# Patient Record
Sex: MALE | Race: WHITE | Employment: UNEMPLOYED | URBAN - METROPOLITAN AREA
[De-identification: names, ages, dates, MRNs, and addresses within clinical notes are randomized per-mention and may not be internally consistent; named-entity substitution may affect disease eponyms.]

---

## 2018-04-26 ENCOUNTER — OFFICE VISIT (OUTPATIENT)
Dept: FAMILY MEDICINE CLINIC | Age: 63
End: 2018-04-26
Payer: MEDICAID

## 2018-04-26 ENCOUNTER — HOSPITAL ENCOUNTER (OUTPATIENT)
Age: 63
Discharge: HOME | End: 2018-04-28
Payer: MEDICAID

## 2018-04-26 VITALS
WEIGHT: 228 LBS | SYSTOLIC BLOOD PRESSURE: 122 MMHG | DIASTOLIC BLOOD PRESSURE: 72 MMHG | OXYGEN SATURATION: 98 % | HEART RATE: 69 BPM | BODY MASS INDEX: 30.22 KG/M2 | TEMPERATURE: 97.8 F | HEIGHT: 73 IN

## 2018-04-26 DIAGNOSIS — G47.33 SLEEP APNEA, OBSTRUCTIVE: ICD-10-CM

## 2018-04-26 DIAGNOSIS — I10 ESSENTIAL HYPERTENSION: ICD-10-CM

## 2018-04-26 DIAGNOSIS — Z13.6 SCREENING FOR AAA (AORTIC ABDOMINAL ANEURYSM): ICD-10-CM

## 2018-04-26 DIAGNOSIS — Z86.2 HISTORY OF IRON DEFICIENCY ANEMIA: ICD-10-CM

## 2018-04-26 DIAGNOSIS — K22.70 BARRETT'S ESOPHAGUS WITHOUT DYSPLASIA: ICD-10-CM

## 2018-04-26 DIAGNOSIS — K51.90 ULCERATIVE COLITIS WITHOUT COMPLICATIONS, UNSPECIFIED LOCATION (HCC): ICD-10-CM

## 2018-04-26 DIAGNOSIS — Z79.4 DIABETES MELLITUS TYPE 2, INSULIN DEPENDENT (HCC): ICD-10-CM

## 2018-04-26 DIAGNOSIS — Z86.79 HISTORY OF ATRIAL FIBRILLATION: ICD-10-CM

## 2018-04-26 DIAGNOSIS — I25.810 CORONARY ARTERY DISEASE INVOLVING CORONARY BYPASS GRAFT OF NATIVE HEART WITHOUT ANGINA PECTORIS: ICD-10-CM

## 2018-04-26 DIAGNOSIS — E78.2 MIXED HYPERLIPIDEMIA: ICD-10-CM

## 2018-04-26 DIAGNOSIS — E11.9 DIABETES MELLITUS TYPE 2, INSULIN DEPENDENT (HCC): ICD-10-CM

## 2018-04-26 DIAGNOSIS — E11.9 DIABETES MELLITUS TYPE 2, INSULIN DEPENDENT (HCC): Primary | ICD-10-CM

## 2018-04-26 DIAGNOSIS — E11.9 ENCOUNTER FOR DIABETIC FOOT EXAM (HCC): ICD-10-CM

## 2018-04-26 DIAGNOSIS — Z79.4 DIABETES MELLITUS TYPE 2, INSULIN DEPENDENT (HCC): Primary | ICD-10-CM

## 2018-04-26 LAB
ALBUMIN SERPL-MCNC: 4.7 G/DL (ref 3.5–5.2)
ALP BLD-CCNC: 87 U/L (ref 40–129)
ALT SERPL-CCNC: 38 U/L (ref 0–40)
ANION GAP SERPL CALCULATED.3IONS-SCNC: 17 MMOL/L (ref 7–16)
AST SERPL-CCNC: 53 U/L (ref 0–39)
BILIRUB SERPL-MCNC: 0.3 MG/DL (ref 0–1.2)
BUN BLDV-MCNC: 19 MG/DL (ref 8–23)
CALCIUM SERPL-MCNC: 9.8 MG/DL (ref 8.6–10.2)
CHLORIDE BLD-SCNC: 105 MMOL/L (ref 98–107)
CHOLESTEROL, TOTAL: 154 MG/DL (ref 0–199)
CO2: 19 MMOL/L (ref 22–29)
CREAT SERPL-MCNC: 0.8 MG/DL (ref 0.7–1.2)
CREATININE URINE: 82 MG/DL (ref 40–278)
FERRITIN: 220 NG/ML
FOLATE: >20 NG/ML (ref 4.8–24.2)
GFR AFRICAN AMERICAN: >60
GFR NON-AFRICAN AMERICAN: >60 ML/MIN/1.73
GLUCOSE BLD-MCNC: 132 MG/DL (ref 74–109)
HBA1C MFR BLD: 6.5 % (ref 4.8–5.9)
HCT VFR BLD CALC: 50.9 % (ref 37–54)
HDLC SERPL-MCNC: 39 MG/DL
HEMOGLOBIN: 16.5 G/DL (ref 12.5–16.5)
IRON SATURATION: 34 % (ref 20–55)
IRON: 139 MCG/DL (ref 59–158)
LDL CHOLESTEROL CALCULATED: 74 MG/DL (ref 0–99)
MCH RBC QN AUTO: 32 PG (ref 26–35)
MCHC RBC AUTO-ENTMCNC: 32.4 % (ref 32–34.5)
MCV RBC AUTO: 98.6 FL (ref 80–99.9)
MICROALBUMIN UR-MCNC: 24.9 MG/L
MICROALBUMIN/CREAT UR-RTO: 30.4 (ref 0–30)
PDW BLD-RTO: 14.6 FL (ref 11.5–15)
PLATELET # BLD: 383 E9/L (ref 130–450)
PMV BLD AUTO: 11.3 FL (ref 7–12)
POTASSIUM SERPL-SCNC: 5 MMOL/L (ref 3.5–5)
RBC # BLD: 5.16 E12/L (ref 3.8–5.8)
SODIUM BLD-SCNC: 141 MMOL/L (ref 132–146)
TOTAL IRON BINDING CAPACITY: 403 MCG/DL (ref 250–450)
TOTAL PROTEIN: 7.5 G/DL (ref 6.4–8.3)
TRIGL SERPL-MCNC: 205 MG/DL (ref 0–149)
VITAMIN B-12: 452 PG/ML (ref 211–946)
VLDLC SERPL CALC-MCNC: 41 MG/DL
WBC # BLD: 8.9 E9/L (ref 4.5–11.5)

## 2018-04-26 PROCEDURE — 85027 COMPLETE CBC AUTOMATED: CPT

## 2018-04-26 PROCEDURE — 82570 ASSAY OF URINE CREATININE: CPT

## 2018-04-26 PROCEDURE — 82044 UR ALBUMIN SEMIQUANTITATIVE: CPT

## 2018-04-26 PROCEDURE — 83036 HEMOGLOBIN GLYCOSYLATED A1C: CPT

## 2018-04-26 PROCEDURE — 3017F COLORECTAL CA SCREEN DOC REV: CPT | Performed by: FAMILY MEDICINE

## 2018-04-26 PROCEDURE — 83540 ASSAY OF IRON: CPT

## 2018-04-26 PROCEDURE — 82728 ASSAY OF FERRITIN: CPT

## 2018-04-26 PROCEDURE — G8417 CALC BMI ABV UP PARAM F/U: HCPCS | Performed by: FAMILY MEDICINE

## 2018-04-26 PROCEDURE — 80053 COMPREHEN METABOLIC PANEL: CPT

## 2018-04-26 PROCEDURE — 99214 OFFICE O/P EST MOD 30 MIN: CPT | Performed by: FAMILY MEDICINE

## 2018-04-26 PROCEDURE — 80061 LIPID PANEL: CPT

## 2018-04-26 PROCEDURE — 36415 COLL VENOUS BLD VENIPUNCTURE: CPT | Performed by: FAMILY MEDICINE

## 2018-04-26 PROCEDURE — 2022F DILAT RTA XM EVC RTNOPTHY: CPT | Performed by: FAMILY MEDICINE

## 2018-04-26 PROCEDURE — 83550 IRON BINDING TEST: CPT

## 2018-04-26 PROCEDURE — G8427 DOCREV CUR MEDS BY ELIG CLIN: HCPCS | Performed by: FAMILY MEDICINE

## 2018-04-26 PROCEDURE — 82746 ASSAY OF FOLIC ACID SERUM: CPT

## 2018-04-26 PROCEDURE — 82607 VITAMIN B-12: CPT

## 2018-04-26 PROCEDURE — 1036F TOBACCO NON-USER: CPT | Performed by: FAMILY MEDICINE

## 2018-04-26 PROCEDURE — 3044F HG A1C LEVEL LT 7.0%: CPT | Performed by: FAMILY MEDICINE

## 2018-04-26 PROCEDURE — G8598 ASA/ANTIPLAT THER USED: HCPCS | Performed by: FAMILY MEDICINE

## 2018-04-26 RX ORDER — PREDNISONE 2.5 MG
2.5 TABLET ORAL DAILY
COMMUNITY
End: 2018-05-23 | Stop reason: SDUPTHER

## 2018-04-26 RX ORDER — ERGOCALCIFEROL 1.25 MG/1
50000 CAPSULE ORAL
COMMUNITY
End: 2018-11-14 | Stop reason: SDUPTHER

## 2018-04-26 RX ORDER — MULTIVITAMIN WITH IRON
100 TABLET ORAL DAILY
COMMUNITY
End: 2018-07-24 | Stop reason: SDUPTHER

## 2018-04-26 ASSESSMENT — PATIENT HEALTH QUESTIONNAIRE - PHQ9
SUM OF ALL RESPONSES TO PHQ9 QUESTIONS 1 & 2: 1
1. LITTLE INTEREST OR PLEASURE IN DOING THINGS: 1
2. FEELING DOWN, DEPRESSED OR HOPELESS: 0
SUM OF ALL RESPONSES TO PHQ QUESTIONS 1-9: 1

## 2018-05-01 ENCOUNTER — OFFICE VISIT (OUTPATIENT)
Dept: FAMILY MEDICINE CLINIC | Age: 63
End: 2018-05-01
Payer: MEDICAID

## 2018-05-01 VITALS
HEART RATE: 78 BPM | OXYGEN SATURATION: 96 % | WEIGHT: 226 LBS | HEIGHT: 73 IN | BODY MASS INDEX: 29.95 KG/M2 | TEMPERATURE: 97.7 F | SYSTOLIC BLOOD PRESSURE: 130 MMHG | DIASTOLIC BLOOD PRESSURE: 70 MMHG

## 2018-05-01 DIAGNOSIS — E11.9 DIABETES MELLITUS TYPE 2, INSULIN DEPENDENT (HCC): Primary | ICD-10-CM

## 2018-05-01 DIAGNOSIS — E78.2 MIXED HYPERLIPIDEMIA: ICD-10-CM

## 2018-05-01 DIAGNOSIS — Z79.4 DIABETES MELLITUS TYPE 2, INSULIN DEPENDENT (HCC): Primary | ICD-10-CM

## 2018-05-01 DIAGNOSIS — I10 ESSENTIAL HYPERTENSION: ICD-10-CM

## 2018-05-01 PROCEDURE — 99213 OFFICE O/P EST LOW 20 MIN: CPT | Performed by: FAMILY MEDICINE

## 2018-05-01 PROCEDURE — 2022F DILAT RTA XM EVC RTNOPTHY: CPT | Performed by: FAMILY MEDICINE

## 2018-05-01 PROCEDURE — G8427 DOCREV CUR MEDS BY ELIG CLIN: HCPCS | Performed by: FAMILY MEDICINE

## 2018-05-01 PROCEDURE — G8417 CALC BMI ABV UP PARAM F/U: HCPCS | Performed by: FAMILY MEDICINE

## 2018-05-01 PROCEDURE — 3017F COLORECTAL CA SCREEN DOC REV: CPT | Performed by: FAMILY MEDICINE

## 2018-05-01 PROCEDURE — 1036F TOBACCO NON-USER: CPT | Performed by: FAMILY MEDICINE

## 2018-05-01 PROCEDURE — G8598 ASA/ANTIPLAT THER USED: HCPCS | Performed by: FAMILY MEDICINE

## 2018-05-01 PROCEDURE — 3044F HG A1C LEVEL LT 7.0%: CPT | Performed by: FAMILY MEDICINE

## 2018-05-01 RX ORDER — FENOFIBRATE 145 MG/1
145 TABLET, COATED ORAL DAILY
Qty: 30 TABLET | Refills: 2 | Status: SHIPPED | OUTPATIENT
Start: 2018-05-01 | End: 2018-07-19 | Stop reason: SDUPTHER

## 2018-05-01 RX ORDER — CHLORAL HYDRATE 500 MG
3000 CAPSULE ORAL DAILY
Qty: 90 CAPSULE | Refills: 5 | Status: SHIPPED | OUTPATIENT
Start: 2018-05-01 | End: 2018-10-03 | Stop reason: SDUPTHER

## 2018-05-01 RX ORDER — ATORVASTATIN CALCIUM 40 MG/1
40 TABLET, FILM COATED ORAL DAILY
COMMUNITY
End: 2018-11-14 | Stop reason: SDUPTHER

## 2018-05-10 DIAGNOSIS — Z13.6 SCREENING FOR AAA (AORTIC ABDOMINAL ANEURYSM): ICD-10-CM

## 2018-05-23 DIAGNOSIS — E11.9 DIABETES MELLITUS TYPE 2, INSULIN DEPENDENT (HCC): ICD-10-CM

## 2018-05-23 DIAGNOSIS — Z79.4 DIABETES MELLITUS TYPE 2, INSULIN DEPENDENT (HCC): ICD-10-CM

## 2018-05-23 DIAGNOSIS — K51.90 ULCERATIVE COLITIS WITHOUT COMPLICATIONS, UNSPECIFIED LOCATION (HCC): Primary | ICD-10-CM

## 2018-05-23 RX ORDER — LANCETS 33 GAUGE
EACH MISCELLANEOUS
Qty: 100 EACH | Refills: 5 | Status: SHIPPED | OUTPATIENT
Start: 2018-05-23 | End: 2018-11-13 | Stop reason: SDUPTHER

## 2018-05-23 RX ORDER — PREDNISONE 2.5 MG
2.5 TABLET ORAL DAILY
Qty: 30 TABLET | Refills: 1 | Status: SHIPPED | OUTPATIENT
Start: 2018-05-23 | End: 2018-07-19 | Stop reason: SDUPTHER

## 2018-05-23 RX ORDER — LANCETS 33 GAUGE
EACH MISCELLANEOUS
Refills: 0 | COMMUNITY
Start: 2018-05-06 | End: 2018-05-23 | Stop reason: SDUPTHER

## 2018-05-23 RX ORDER — AZATHIOPRINE 50 MG/1
50 TABLET ORAL 3 TIMES DAILY
Qty: 90 TABLET | Refills: 1 | Status: SHIPPED | OUTPATIENT
Start: 2018-05-23 | End: 2018-07-19 | Stop reason: SDUPTHER

## 2018-05-24 DIAGNOSIS — M25.50 ARTHRALGIA, UNSPECIFIED JOINT: Primary | ICD-10-CM

## 2018-05-24 DIAGNOSIS — G44.029 CHRONIC CLUSTER HEADACHE, NOT INTRACTABLE: ICD-10-CM

## 2018-05-24 RX ORDER — NAPROXEN 500 MG/1
500 TABLET ORAL 2 TIMES DAILY PRN
Qty: 60 TABLET | Refills: 1 | Status: SHIPPED | OUTPATIENT
Start: 2018-05-24 | End: 2018-07-19 | Stop reason: SDUPTHER

## 2018-06-26 ENCOUNTER — HOSPITAL ENCOUNTER (OUTPATIENT)
Age: 63
Discharge: HOME | End: 2018-06-28
Payer: MEDICAID

## 2018-06-26 ENCOUNTER — NURSE ONLY (OUTPATIENT)
Dept: FAMILY MEDICINE CLINIC | Age: 63
End: 2018-06-26
Payer: MEDICAID

## 2018-06-26 DIAGNOSIS — Z79.4 DIABETES MELLITUS TYPE 2, INSULIN DEPENDENT (HCC): Primary | ICD-10-CM

## 2018-06-26 DIAGNOSIS — E11.9 DIABETES MELLITUS TYPE 2, INSULIN DEPENDENT (HCC): Primary | ICD-10-CM

## 2018-06-26 DIAGNOSIS — E78.2 MIXED HYPERLIPIDEMIA: ICD-10-CM

## 2018-06-26 LAB
ALBUMIN SERPL-MCNC: 4.2 G/DL (ref 3.5–5.2)
ALP BLD-CCNC: 82 U/L (ref 40–129)
ALT SERPL-CCNC: 42 U/L (ref 0–40)
ANION GAP SERPL CALCULATED.3IONS-SCNC: 16 MMOL/L (ref 7–16)
AST SERPL-CCNC: 46 U/L (ref 0–39)
BILIRUB SERPL-MCNC: 0.4 MG/DL (ref 0–1.2)
BILIRUBIN DIRECT: <0.2 MG/DL (ref 0–0.3)
BILIRUBIN, INDIRECT: ABNORMAL MG/DL (ref 0–1)
BUN BLDV-MCNC: 19 MG/DL (ref 8–23)
CALCIUM SERPL-MCNC: 9.4 MG/DL (ref 8.6–10.2)
CHLORIDE BLD-SCNC: 100 MMOL/L (ref 98–107)
CHOLESTEROL, TOTAL: 124 MG/DL (ref 0–199)
CO2: 24 MMOL/L (ref 22–29)
CREAT SERPL-MCNC: 1.1 MG/DL (ref 0.7–1.2)
GFR AFRICAN AMERICAN: >60
GFR NON-AFRICAN AMERICAN: >60 ML/MIN/1.73
GLUCOSE BLD-MCNC: 86 MG/DL (ref 74–109)
HDLC SERPL-MCNC: 38 MG/DL
LDL CHOLESTEROL CALCULATED: 55 MG/DL (ref 0–99)
POTASSIUM SERPL-SCNC: 5.1 MMOL/L (ref 3.5–5)
SODIUM BLD-SCNC: 140 MMOL/L (ref 132–146)
TOTAL PROTEIN: 7.1 G/DL (ref 6.4–8.3)
TRIGL SERPL-MCNC: 155 MG/DL (ref 0–149)
VLDLC SERPL CALC-MCNC: 31 MG/DL

## 2018-06-26 PROCEDURE — 80061 LIPID PANEL: CPT

## 2018-06-26 PROCEDURE — 36415 COLL VENOUS BLD VENIPUNCTURE: CPT | Performed by: FAMILY MEDICINE

## 2018-06-26 PROCEDURE — 80048 BASIC METABOLIC PNL TOTAL CA: CPT

## 2018-06-26 PROCEDURE — 80076 HEPATIC FUNCTION PANEL: CPT

## 2018-06-27 DIAGNOSIS — K51.90 ULCERATIVE COLITIS WITHOUT COMPLICATIONS, UNSPECIFIED LOCATION (HCC): Primary | ICD-10-CM

## 2018-06-27 RX ORDER — MESALAMINE 1.2 G/1
1.2 TABLET, DELAYED RELEASE ORAL 3 TIMES DAILY
Qty: 30 TABLET | Refills: 2 | Status: SHIPPED | OUTPATIENT
Start: 2018-06-27 | End: 2018-06-27 | Stop reason: SDUPTHER

## 2018-06-27 RX ORDER — MESALAMINE 1.2 G/1
1.2 TABLET, DELAYED RELEASE ORAL 3 TIMES DAILY
Qty: 90 TABLET | Refills: 2 | Status: SHIPPED | OUTPATIENT
Start: 2018-06-27 | End: 2018-09-18 | Stop reason: SDUPTHER

## 2018-06-27 RX ORDER — MESALAMINE 0.38 G/1
1.2 CAPSULE, EXTENDED RELEASE ORAL 3 TIMES DAILY
Qty: 120 CAPSULE | OUTPATIENT
Start: 2018-06-27

## 2018-06-27 RX ORDER — MESALAMINE 1.2 G/1
1.2 TABLET, DELAYED RELEASE ORAL
Refills: 0 | COMMUNITY
Start: 2018-03-26 | End: 2018-06-27 | Stop reason: SDUPTHER

## 2018-06-28 DIAGNOSIS — E11.9 DIABETES MELLITUS TYPE 2, INSULIN DEPENDENT (HCC): Primary | ICD-10-CM

## 2018-06-28 DIAGNOSIS — Z79.4 DIABETES MELLITUS TYPE 2, INSULIN DEPENDENT (HCC): Primary | ICD-10-CM

## 2018-07-03 ENCOUNTER — OFFICE VISIT (OUTPATIENT)
Dept: FAMILY MEDICINE CLINIC | Age: 63
End: 2018-07-03
Payer: MEDICAID

## 2018-07-03 VITALS
DIASTOLIC BLOOD PRESSURE: 72 MMHG | WEIGHT: 230 LBS | OXYGEN SATURATION: 98 % | HEART RATE: 70 BPM | SYSTOLIC BLOOD PRESSURE: 120 MMHG | BODY MASS INDEX: 30.48 KG/M2 | TEMPERATURE: 98.3 F | HEIGHT: 73 IN

## 2018-07-03 DIAGNOSIS — I65.22 STENOSIS OF LEFT EXTERNAL CAROTID ARTERY: ICD-10-CM

## 2018-07-03 DIAGNOSIS — Z79.4 DIABETES MELLITUS TYPE 2, INSULIN DEPENDENT (HCC): Primary | ICD-10-CM

## 2018-07-03 DIAGNOSIS — G89.29 CHRONIC RIGHT SHOULDER PAIN: ICD-10-CM

## 2018-07-03 DIAGNOSIS — Z11.4 ENCOUNTER FOR SCREENING FOR HIV: ICD-10-CM

## 2018-07-03 DIAGNOSIS — M25.511 CHRONIC RIGHT SHOULDER PAIN: ICD-10-CM

## 2018-07-03 DIAGNOSIS — R79.89 ELEVATED LIVER FUNCTION TESTS: ICD-10-CM

## 2018-07-03 DIAGNOSIS — E78.2 MIXED HYPERLIPIDEMIA: ICD-10-CM

## 2018-07-03 DIAGNOSIS — E55.9 VITAMIN D DEFICIENCY: ICD-10-CM

## 2018-07-03 DIAGNOSIS — E11.9 DIABETES MELLITUS TYPE 2, INSULIN DEPENDENT (HCC): Primary | ICD-10-CM

## 2018-07-03 DIAGNOSIS — Z11.59 NEED FOR HEPATITIS C SCREENING TEST: ICD-10-CM

## 2018-07-03 DIAGNOSIS — Z72.0 TOBACCO USE: ICD-10-CM

## 2018-07-03 DIAGNOSIS — I10 ESSENTIAL HYPERTENSION: ICD-10-CM

## 2018-07-03 DIAGNOSIS — G47.33 SLEEP APNEA, OBSTRUCTIVE: ICD-10-CM

## 2018-07-03 PROCEDURE — 99215 OFFICE O/P EST HI 40 MIN: CPT | Performed by: FAMILY MEDICINE

## 2018-07-03 PROCEDURE — 1036F TOBACCO NON-USER: CPT | Performed by: FAMILY MEDICINE

## 2018-07-03 PROCEDURE — G8427 DOCREV CUR MEDS BY ELIG CLIN: HCPCS | Performed by: FAMILY MEDICINE

## 2018-07-03 PROCEDURE — 2022F DILAT RTA XM EVC RTNOPTHY: CPT | Performed by: FAMILY MEDICINE

## 2018-07-03 PROCEDURE — 3017F COLORECTAL CA SCREEN DOC REV: CPT | Performed by: FAMILY MEDICINE

## 2018-07-03 PROCEDURE — G8417 CALC BMI ABV UP PARAM F/U: HCPCS | Performed by: FAMILY MEDICINE

## 2018-07-03 PROCEDURE — 3044F HG A1C LEVEL LT 7.0%: CPT | Performed by: FAMILY MEDICINE

## 2018-07-03 PROCEDURE — G8598 ASA/ANTIPLAT THER USED: HCPCS | Performed by: FAMILY MEDICINE

## 2018-07-03 NOTE — PROGRESS NOTES
History and Physical    Patient:   61 y.o. male MRN: <[id number]>     Date of Service: 7/3/2018      Chief complaint:    History of Present Illness   The patient is a 61 y.o. male    Chief Complaint   Patient presents with    Discuss Labs      CC:  DM type 2, HTN, Hyperlipidemia, ZEENAT, right shoulder pain                       C/o Fatigue                        Hx of ZEENAT - last testing [facility] - could not tolerate CPAP mask                        Tobacco use- he quit cigarettes - he vapes daily                            Past Medical History:      Diagnosis Date    Arthritis     Colitis     Coronary artery disease of native artery of native heart with stable angina pectoris ([facility]     Diabetes mellitus ([facility]     Hypertension     Sleep apnea        Past Surgical History:        Procedure Laterality Date    APPENDECTOMY      BACK SURGERY      CARDIAC CATHETERIZATION  03/29/2017    Dr. Zee Ala GRAFT  04/06/2017    x2    ENDOSCOPY, COLON, DIAGNOSTIC      FRACTURE SURGERY      wrist    FRACTURE SURGERY      left tibia    FRACTURE SURGERY      fingers    FRACTURE SURGERY      collar bone    UVULECTOMY         Allergies:  Lisinopril    Social History:   TOBACCO:   reports that he quit smoking about 3 years ago. His smoking use included E-Cigarettes and Cigarettes. He has a 52.50 pack-year smoking history. He has never used smokeless tobacco.  ETOH:   reports that he drinks about 6.0 oz of alcohol per week .       Family History:   Family History   Problem Relation Age of Onset    Asthma Mother     Diabetes Father     Heart Disease Father        REVIEW OF SYSTEMS:     Review of Systems - General ROS: negative for - fever  Ophthalmic ROS: negative for - blurry vision, eye pain or loss of vision  ENT ROS: negative for - epistaxis, nasal congestion or nasal discharge  Has seen ENT for exam and states hearing testing was o.k    Allergy and Immunology ROS: negative for - seasonal allergies that he is aware of- he will have some nasal sx with mowing   Respiratory ROS: no cough, shortness of breath , or wheezing-hx in the past of pleural effusions-evaluated by Dr Torsten Sparks 2017 following    cardiac surgery     Cardiovascular ROS: no chest pain or dyspnea on exertion with walking- he will have some shortness of breath with exercise on the treadmill  Gastrointestinal ROS: no abdominal pain, no black or bloody stools, hx of colitis with intermittent diarrhea   Genito-Urinary ROS: no dysuria, no hematuria-hx of urinary stones with occasional difficulty voiding - followed by Dr Malu Garcia ROS:chronic right shoulder pain- had MRI -was told by ortho he needs rotator cuff surgery       Physical Exam   /72 (Site: Right Arm, Position: Sitting, Cuff Size: Large Adult)   Pulse 70   Temp 98.3 °F (36.8 °C) (Oral)   Ht 6' 0.6\" (1.844 m)   Wt 230 lb (104.3 kg)   SpO2 98%   BMI 30.68 kg/m²   Physical Examination: General appearance - alert, well appearing, and in no distress  Head-normocephalic   Mental status - normal mood, behavior, speech, dress, motor activity, and thought processes  Eyes - pupils equal and reactive, extraocular eye movements intact  Ears - bilateral TM's and external ear canals normal  Nose - normal and patent, no erythema, discharge or polyps  Mouth - mucous membranes moist, pharynx normal without lesions  Chest - clear to auscultation, no wheezes, rales or rhonchi, symmetric air entry  Heart - normal rate and regular rhythm  Abdomen - soft, nontender, nondistended, no masses or organomegaly  +diastasis  Extremities -no edema of the lower ext     Lunda Pastures was seen today for discuss labs. Diagnoses and all orders for this visit:    Diabetes mellitus type 2, insulin dependent ([facility]  -     Hemoglobin A1C; Future  -     Microalbumin / Creatinine Urine Ratio;  Future    Essential hypertension  Blood pressure is controlled     Mixed hyperlipidemia  On fenofibrate and atorvastatin -gemfibrozil previously stopped- triglycerides are 155 and LDL is 55    Sleep apnea, obstructive  Encouraged to get another sleep test - he declined for the present time and states he understands the health risks of untreated sleep apnea      Chronic right shoulder pain  Followed by ortho     Stenosis of left external carotid artery  -     US CAROTID ARTERY BILATERAL; Future  Due to have repeated     Tobacco use  Encouraged to quit use (vapes)   Patient counseled to stop smoking and using tobacco.  The patient understands the complications and consequences that might be encountered by smoking. These include but are not limited to lung disease, cancer, stroke, heart disease and ultimately death. Encounter for screening for HIV  -     HIV-1 AND HIV-2 ANTIBODIES; Future    Need for hepatitis C screening test  -     HEPATITIS C ANTIBODY; Future    Vitamin D deficiency  -     Vitamin D 25 Hydrox, D2 & D3; Future    Elevated liver function tests  -     Hepatic Function Panel; Future    Labs were reviewed with him today   Average for glucoses 152 -followed [facility]   He agreed to HIV and Hep C screening   Pt refused CT screening of the lung - he does not think he will be able to tolerate getting on the table for the CT screening of the lung   Follow-up in two mn -he will have labs drawn at [facility] prior to the visit and I asked him to call when he has them drawn so results can be obtained. Please note that >40 minutes was spent face-to-face with patient gathering history, performing physical exam, discussing findings, counseling patient, and determining plan forward. All questions addressed and answered. Sheng ANN.

## 2018-07-19 DIAGNOSIS — K51.90 ULCERATIVE COLITIS WITHOUT COMPLICATIONS, UNSPECIFIED LOCATION (HCC): ICD-10-CM

## 2018-07-19 DIAGNOSIS — G44.029 CHRONIC CLUSTER HEADACHE, NOT INTRACTABLE: ICD-10-CM

## 2018-07-19 DIAGNOSIS — E78.2 MIXED HYPERLIPIDEMIA: ICD-10-CM

## 2018-07-19 DIAGNOSIS — M25.50 ARTHRALGIA, UNSPECIFIED JOINT: ICD-10-CM

## 2018-07-19 RX ORDER — FENOFIBRATE 145 MG/1
145 TABLET, COATED ORAL DAILY
Qty: 30 TABLET | Refills: 5 | Status: SHIPPED | OUTPATIENT
Start: 2018-07-19 | End: 2018-12-27 | Stop reason: SDUPTHER

## 2018-07-19 RX ORDER — NAPROXEN 500 MG/1
500 TABLET ORAL 2 TIMES DAILY PRN
Qty: 60 TABLET | Refills: 1 | Status: CANCELLED | OUTPATIENT
Start: 2018-07-19

## 2018-07-19 RX ORDER — AZATHIOPRINE 50 MG/1
50 TABLET ORAL 3 TIMES DAILY
Qty: 90 TABLET | Refills: 3 | Status: SHIPPED | OUTPATIENT
Start: 2018-07-19 | End: 2018-11-27 | Stop reason: SDUPTHER

## 2018-07-19 RX ORDER — NAPROXEN 500 MG/1
500 TABLET ORAL 2 TIMES DAILY PRN
Qty: 60 TABLET | Refills: 2 | Status: SHIPPED | OUTPATIENT
Start: 2018-07-19 | End: 2018-10-03 | Stop reason: SDUPTHER

## 2018-07-19 RX ORDER — PREDNISONE 2.5 MG
2.5 TABLET ORAL DAILY
Qty: 30 TABLET | Refills: 5 | Status: SHIPPED | OUTPATIENT
Start: 2018-07-19 | End: 2018-12-27 | Stop reason: SDUPTHER

## 2018-07-19 RX ORDER — PREDNISONE 2.5 MG
2.5 TABLET ORAL DAILY
Qty: 30 TABLET | Refills: 1 | Status: CANCELLED | OUTPATIENT
Start: 2018-07-19

## 2018-07-19 RX ORDER — FENOFIBRATE 145 MG/1
145 TABLET, COATED ORAL DAILY
Qty: 30 TABLET | Refills: 2 | Status: CANCELLED | OUTPATIENT
Start: 2018-07-19

## 2018-07-19 NOTE — TELEPHONE ENCOUNTER
From: Alejandro Urbano  Sent: 7/19/2018 10:40 AM EDT  Subject: Medication Renewal Request    Alejandro Sundeep would like a refill of the following medications:     fenofibrate (TRICOR) 145 MG tablet Regi Kimball, DO]   Patient Comment: please refill     predniSONE (DELTASONE) 2.5 MG tablet Regi Kimball, DO]   Patient Comment: please refill     naproxen (NAPROSYN) 500 MG tablet Regi Kimball, DO]   Patient Comment: please refill    Preferred pharmacy: [address] [place] [address] [address] [phone] F [phone]

## 2018-07-24 DIAGNOSIS — E53.1 VITAMIN B6 DEFICIENCY: Primary | ICD-10-CM

## 2018-07-24 RX ORDER — MULTIVITAMIN WITH IRON
100 TABLET ORAL DAILY
Qty: 90 TABLET | Refills: 3 | Status: SHIPPED | OUTPATIENT
Start: 2018-07-24 | End: 2019-06-12

## 2018-07-27 LAB
AVERAGE GLUCOSE: 100
HBA1C MFR BLD: 6.2 %

## 2018-07-30 ENCOUNTER — HOSPITAL ENCOUNTER (OUTPATIENT)
Dept: ULTRASOUND IMAGING | Age: 63
Discharge: HOME | End: 2018-08-01
Payer: MEDICAID

## 2018-07-30 DIAGNOSIS — I65.22 STENOSIS OF LEFT EXTERNAL CAROTID ARTERY: ICD-10-CM

## 2018-07-30 PROCEDURE — 93880 EXTRACRANIAL BILAT STUDY: CPT

## 2018-08-24 DIAGNOSIS — Z79.4 DIABETES MELLITUS TYPE 2, INSULIN DEPENDENT (HCC): ICD-10-CM

## 2018-08-24 DIAGNOSIS — E11.9 DIABETES MELLITUS TYPE 2, INSULIN DEPENDENT (HCC): ICD-10-CM

## 2018-08-24 DIAGNOSIS — R79.89 ELEVATED LIVER FUNCTION TESTS: ICD-10-CM

## 2018-08-27 DIAGNOSIS — Z11.59 NEED FOR HEPATITIS C SCREENING TEST: ICD-10-CM

## 2018-08-27 DIAGNOSIS — E11.9 DIABETES MELLITUS TYPE 2, INSULIN DEPENDENT (HCC): ICD-10-CM

## 2018-08-27 DIAGNOSIS — Z79.4 DIABETES MELLITUS TYPE 2, INSULIN DEPENDENT (HCC): ICD-10-CM

## 2018-08-27 DIAGNOSIS — Z11.4 ENCOUNTER FOR SCREENING FOR HIV: ICD-10-CM

## 2018-09-04 DIAGNOSIS — E55.9 VITAMIN D DEFICIENCY: ICD-10-CM

## 2018-09-07 ENCOUNTER — OFFICE VISIT (OUTPATIENT)
Dept: FAMILY MEDICINE CLINIC | Age: 63
End: 2018-09-07
Payer: MEDICAID

## 2018-09-07 VITALS
TEMPERATURE: 98.3 F | WEIGHT: 229 LBS | OXYGEN SATURATION: 98 % | DIASTOLIC BLOOD PRESSURE: 82 MMHG | HEART RATE: 102 BPM | SYSTOLIC BLOOD PRESSURE: 138 MMHG | BODY MASS INDEX: 30.35 KG/M2 | HEIGHT: 73 IN

## 2018-09-07 DIAGNOSIS — E78.2 MIXED HYPERLIPIDEMIA: ICD-10-CM

## 2018-09-07 DIAGNOSIS — E11.9 DIABETES MELLITUS TYPE 2, INSULIN DEPENDENT (HCC): Primary | ICD-10-CM

## 2018-09-07 DIAGNOSIS — G89.29 CHRONIC RIGHT SHOULDER PAIN: ICD-10-CM

## 2018-09-07 DIAGNOSIS — M25.511 CHRONIC RIGHT SHOULDER PAIN: ICD-10-CM

## 2018-09-07 DIAGNOSIS — K51.90 ULCERATIVE COLITIS WITHOUT COMPLICATIONS, UNSPECIFIED LOCATION (HCC): ICD-10-CM

## 2018-09-07 DIAGNOSIS — Z79.4 DIABETES MELLITUS TYPE 2, INSULIN DEPENDENT (HCC): Primary | ICD-10-CM

## 2018-09-07 DIAGNOSIS — I10 ESSENTIAL HYPERTENSION: ICD-10-CM

## 2018-09-07 DIAGNOSIS — R61 CHRONIC NIGHT SWEATS: ICD-10-CM

## 2018-09-07 DIAGNOSIS — G47.33 SLEEP APNEA, OBSTRUCTIVE: ICD-10-CM

## 2018-09-07 PROCEDURE — 1036F TOBACCO NON-USER: CPT | Performed by: FAMILY MEDICINE

## 2018-09-07 PROCEDURE — 99213 OFFICE O/P EST LOW 20 MIN: CPT | Performed by: FAMILY MEDICINE

## 2018-09-07 PROCEDURE — 3017F COLORECTAL CA SCREEN DOC REV: CPT | Performed by: FAMILY MEDICINE

## 2018-09-07 PROCEDURE — G8598 ASA/ANTIPLAT THER USED: HCPCS | Performed by: FAMILY MEDICINE

## 2018-09-07 PROCEDURE — 3044F HG A1C LEVEL LT 7.0%: CPT | Performed by: FAMILY MEDICINE

## 2018-09-07 PROCEDURE — 2022F DILAT RTA XM EVC RTNOPTHY: CPT | Performed by: FAMILY MEDICINE

## 2018-09-07 PROCEDURE — G8417 CALC BMI ABV UP PARAM F/U: HCPCS | Performed by: FAMILY MEDICINE

## 2018-09-07 PROCEDURE — G8427 DOCREV CUR MEDS BY ELIG CLIN: HCPCS | Performed by: FAMILY MEDICINE

## 2018-09-07 NOTE — PROGRESS NOTES
Problem Relation Age of Onset    Asthma Mother     Diabetes Father     Heart Disease Father        REVIEW OF SYSTEMS:     Review of Systems - General ROS: negative for - chills, fever or weight loss  He states he always feels fatigued - he states he thinks this is due to his sleep apnea and he is not using his equipment . Ophthalmic ROS: negative for - double vision, eye pain or loss of vision  ENT ROS: negative for - epistaxis, headaches, nasal congestion, nasal discharge, sinus pain, tinnitus, vertigo or visual changes  Hematological and Lymphatic ROS: positive for - bruising-states only occasionally -limited to legs and arms  +night sweats and \"sweating all day\"   negative for - bleeding problems, blood clots, blood transfusions, jaundice, swollen lymph nodes or weight loss  Respiratory ROS: no cough, shortness of breath, or wheezing  Cardiovascular ROS: no chest pain or dyspnea on exertion  negative for - dyspnea on exertion, palpitations or rapid heart rate  He occasionally has edema of the feet that he attributes to \"too much salt\" after he eats salty food. he states this occurs about once monthly and resolves within a day.    Gastrointestinal ROS: no abdominal pain, no bloody stools  He has not had a flare of his colitis \"for some time\"   Genito-Urinary ROS: no dysuria, no hematuria  He occasionally has a weak urinary stream   Musculoskeletal ROS: positive for - pain in right shoulder  Neurological ROS: no TIA or stroke symptoms  negative for - dizziness, headaches, impaired coordination/balance, numbness/tingling, visual changes or weakness  Dermatological ROS: negative for - hair changes, mole changes, rash or skin lesion changes  Notes \"rough spot\" behind left ear     Physical Exam   /82 (Site: Right Upper Arm, Position: Sitting, Cuff Size: Large Adult)   Pulse 102   Temp 98.3 °F (36.8 °C) (Oral)   Ht 6' 0.6\" (1.844 m)   Wt 229 lb (103.9 kg)   SpO2 98%   BMI 30.55 kg/m²   Physical Examination: General appearance - alert, well appearing, and in no distress  Head-normocephalic   Ears - bilateral TM's and external ear canals were not injected   Nose - nares are clear and patent, no mucosal erythema, no nasal discharge   Mouth - mucous membranes moist, pharynx was not injected and was without lesions  Neck - supple, no palpable adenopathy  Lymphatics - no palpable supraclavicular lymphadenopathy  Chest - clear to auscultation, no wheezes, rales or rhonchi, symmetric air entry  Heart - normal rate, regular rhythm, normal S1, S2, no murmurs, rubs, clicks or gallops  Abdomen - soft, nontender, nondistended, no masses palpable, no guarding  Extremities - no edema of the extremities   Skin - left posterior ear with raised, dime-sized dry patch of skin     Mar Collette was seen today for diabetes. Diagnoses and all orders for this visit:    Diabetes mellitus type 2, insulin dependent ([address]  Diabetes is well controlled with HemA1C 6.7     Mixed hyperlipidemia  Controlled on atorvastatin and fenofibrate    Chronic right shoulder pain  He is treated by orthopedics     Essential hypertension  Blood pressure has been controlled    Ulcerative colitis without complications, unspecified location (HCC)    Chronic night sweats  -     MISCELLANEOUS SENDOUT 1; Future for TB testing   -     T4, Free; Future  -     CBC Auto Differential; Future  -     TSH without Reflex; Future    Sleep apnea, obstructive  He is noncompliant with use of the slep apnea equipment      He will have labs drawn at [facility]   Last diabetic eye exam was spring 2018   Given phone number for ar Corado to schedule an appointment for skin lesion. He states he does not wish  low dose CT screening for lung cancer- he has claustrophobia and states he does not think he would be able to tolerate a CT scan. He sees urology every year for PSA - Dr Alayna Damian   Return for regularly scheduled visit in 6 mn pending the above.        Sia Peterson

## 2018-09-17 DIAGNOSIS — R61 NIGHT SWEATS: Primary | ICD-10-CM

## 2018-09-17 DIAGNOSIS — R61 CHRONIC NIGHT SWEATS: ICD-10-CM

## 2018-09-18 DIAGNOSIS — K51.90 ULCERATIVE COLITIS WITHOUT COMPLICATIONS, UNSPECIFIED LOCATION (HCC): ICD-10-CM

## 2018-09-18 RX ORDER — MESALAMINE 1.2 G/1
1.2 TABLET, DELAYED RELEASE ORAL 3 TIMES DAILY
Qty: 90 TABLET | Refills: 2 | Status: SHIPPED | OUTPATIENT
Start: 2018-09-18 | End: 2018-11-14 | Stop reason: SDUPTHER

## 2018-09-18 NOTE — TELEPHONE ENCOUNTER
From: Irma Carpenter  Sent: 9/18/2018 11:11 AM EDT  Subject: Medication Renewal Request    Irma Carpenter would like a refill of the following medications:     mesalamine (LIALDA) 1.2 g EC tablet Juve Miller DO]   Patient Comment: Refill Please    Preferred pharmacy: [address] [place] [address] [address] [phone] F [phone]

## 2018-09-21 DIAGNOSIS — R61 NIGHT SWEATS: ICD-10-CM

## 2018-09-28 DIAGNOSIS — Z79.4 DIABETES MELLITUS TYPE 2, INSULIN DEPENDENT (HCC): ICD-10-CM

## 2018-09-28 DIAGNOSIS — E11.9 DIABETES MELLITUS TYPE 2, INSULIN DEPENDENT (HCC): ICD-10-CM

## 2018-10-03 DIAGNOSIS — G44.029 CHRONIC CLUSTER HEADACHE, NOT INTRACTABLE: ICD-10-CM

## 2018-10-03 DIAGNOSIS — I10 ESSENTIAL HYPERTENSION: Primary | ICD-10-CM

## 2018-10-03 DIAGNOSIS — K51.90 ULCERATIVE COLITIS WITHOUT COMPLICATIONS, UNSPECIFIED LOCATION (HCC): ICD-10-CM

## 2018-10-03 DIAGNOSIS — M25.50 ARTHRALGIA, UNSPECIFIED JOINT: ICD-10-CM

## 2018-10-03 DIAGNOSIS — E78.2 MIXED HYPERLIPIDEMIA: ICD-10-CM

## 2018-10-03 RX ORDER — AMLODIPINE BESYLATE 10 MG/1
10 TABLET ORAL DAILY
Qty: 30 TABLET | Refills: 6 | Status: SHIPPED | OUTPATIENT
Start: 2018-10-03 | End: 2019-08-21 | Stop reason: SDUPTHER

## 2018-10-03 RX ORDER — CHLORAL HYDRATE 500 MG
3000 CAPSULE ORAL DAILY
Qty: 90 CAPSULE | Refills: 6 | Status: SHIPPED | OUTPATIENT
Start: 2018-10-03 | End: 2019-05-30 | Stop reason: SDUPTHER

## 2018-10-03 RX ORDER — NAPROXEN 500 MG/1
500 TABLET ORAL 2 TIMES DAILY PRN
Qty: 60 TABLET | Refills: 2 | Status: CANCELLED | OUTPATIENT
Start: 2018-10-03

## 2018-10-03 RX ORDER — NAPROXEN 500 MG/1
500 TABLET ORAL 2 TIMES DAILY PRN
Qty: 60 TABLET | Refills: 2 | Status: SHIPPED | OUTPATIENT
Start: 2018-10-03 | End: 2018-12-27 | Stop reason: SDUPTHER

## 2018-10-03 RX ORDER — AMLODIPINE BESYLATE 10 MG/1
10 TABLET ORAL DAILY
Qty: 30 TABLET | Refills: 5 | Status: CANCELLED | OUTPATIENT
Start: 2018-10-03

## 2018-10-11 ENCOUNTER — TELEPHONE (OUTPATIENT)
Dept: FAMILY MEDICINE CLINIC | Age: 63
End: 2018-10-11

## 2018-10-22 DIAGNOSIS — G44.029 CHRONIC CLUSTER HEADACHE, NOT INTRACTABLE: ICD-10-CM

## 2018-10-22 DIAGNOSIS — M25.50 ARTHRALGIA, UNSPECIFIED JOINT: ICD-10-CM

## 2018-10-22 RX ORDER — NAPROXEN 500 MG/1
500 TABLET ORAL 2 TIMES DAILY PRN
Qty: 60 TABLET | Refills: 2 | OUTPATIENT
Start: 2018-10-22

## 2018-11-09 LAB
AVERAGE GLUCOSE: 104
HBA1C MFR BLD: 6.2 %

## 2018-11-13 DIAGNOSIS — E11.9 DIABETES MELLITUS TYPE 2, INSULIN DEPENDENT (HCC): ICD-10-CM

## 2018-11-13 DIAGNOSIS — Z79.4 DIABETES MELLITUS TYPE 2, INSULIN DEPENDENT (HCC): ICD-10-CM

## 2018-11-13 RX ORDER — LANCETS 33 GAUGE
EACH MISCELLANEOUS
Qty: 100 EACH | Refills: 6 | Status: SHIPPED | OUTPATIENT
Start: 2018-11-13 | End: 2020-03-02 | Stop reason: SDUPTHER

## 2018-11-14 DIAGNOSIS — M62.830 SPASM OF MUSCLE OF LOWER BACK: ICD-10-CM

## 2018-11-14 DIAGNOSIS — K22.70 BARRETT'S ESOPHAGUS WITHOUT DYSPLASIA: ICD-10-CM

## 2018-11-14 DIAGNOSIS — E78.2 MIXED HYPERLIPIDEMIA: Primary | ICD-10-CM

## 2018-11-14 DIAGNOSIS — E55.9 VITAMIN D DEFICIENCY: ICD-10-CM

## 2018-11-14 DIAGNOSIS — K51.90 ULCERATIVE COLITIS WITHOUT COMPLICATIONS, UNSPECIFIED LOCATION (HCC): ICD-10-CM

## 2018-11-14 RX ORDER — PANTOPRAZOLE SODIUM 40 MG/1
40 TABLET, DELAYED RELEASE ORAL DAILY
Qty: 30 TABLET | Refills: 3 | Status: SHIPPED | OUTPATIENT
Start: 2018-11-14 | End: 2019-06-12

## 2018-11-14 RX ORDER — ERGOCALCIFEROL 1.25 MG/1
50000 CAPSULE ORAL
Qty: 30 CAPSULE | Refills: 3 | Status: SHIPPED | OUTPATIENT
Start: 2018-11-14 | End: 2019-12-09 | Stop reason: SDUPTHER

## 2018-11-14 RX ORDER — MULTIVITAMIN WITH FOLIC ACID 400 MCG
1 TABLET ORAL DAILY
Qty: 30 TABLET | Refills: 6 | Status: SHIPPED | OUTPATIENT
Start: 2018-11-14 | End: 2019-05-30 | Stop reason: SDUPTHER

## 2018-11-14 RX ORDER — MESALAMINE 1.2 G/1
1.2 TABLET, DELAYED RELEASE ORAL 3 TIMES DAILY
Qty: 90 TABLET | Refills: 2 | Status: SHIPPED | OUTPATIENT
Start: 2018-11-14 | End: 2018-12-27 | Stop reason: SDUPTHER

## 2018-11-14 RX ORDER — CYCLOBENZAPRINE HCL 5 MG
5 TABLET ORAL EVERY 8 HOURS PRN
Qty: 30 TABLET | Refills: 3 | Status: SHIPPED | OUTPATIENT
Start: 2018-11-14 | End: 2018-12-26 | Stop reason: SDUPTHER

## 2018-11-14 RX ORDER — ATORVASTATIN CALCIUM 40 MG/1
40 TABLET, FILM COATED ORAL DAILY
Qty: 30 TABLET | Refills: 3 | Status: SHIPPED | OUTPATIENT
Start: 2018-11-14 | End: 2019-08-21 | Stop reason: SDUPTHER

## 2018-11-27 DIAGNOSIS — K51.90 ULCERATIVE COLITIS WITHOUT COMPLICATIONS, UNSPECIFIED LOCATION (HCC): ICD-10-CM

## 2018-11-27 RX ORDER — AZATHIOPRINE 50 MG/1
TABLET ORAL
Qty: 90 TABLET | Refills: 3 | Status: SHIPPED | OUTPATIENT
Start: 2018-11-27

## 2018-11-28 DIAGNOSIS — R61 CHRONIC NIGHT SWEATS: ICD-10-CM

## 2018-11-30 ENCOUNTER — TELEPHONE (OUTPATIENT)
Dept: FAMILY MEDICINE CLINIC | Age: 63
End: 2018-11-30

## 2018-12-26 DIAGNOSIS — M62.830 SPASM OF MUSCLE OF LOWER BACK: ICD-10-CM

## 2018-12-26 RX ORDER — CYCLOBENZAPRINE HCL 5 MG
5 TABLET ORAL EVERY 8 HOURS PRN
Qty: 90 TABLET | Refills: 1 | Status: SHIPPED | OUTPATIENT
Start: 2018-12-26 | End: 2018-12-27 | Stop reason: SDUPTHER

## 2018-12-27 DIAGNOSIS — M25.50 ARTHRALGIA, UNSPECIFIED JOINT: ICD-10-CM

## 2018-12-27 DIAGNOSIS — E78.2 MIXED HYPERLIPIDEMIA: ICD-10-CM

## 2018-12-27 DIAGNOSIS — G44.029 CHRONIC CLUSTER HEADACHE, NOT INTRACTABLE: ICD-10-CM

## 2018-12-27 DIAGNOSIS — M62.830 SPASM OF MUSCLE OF LOWER BACK: ICD-10-CM

## 2018-12-27 DIAGNOSIS — K51.90 ULCERATIVE COLITIS WITHOUT COMPLICATIONS, UNSPECIFIED LOCATION (HCC): ICD-10-CM

## 2018-12-27 RX ORDER — CYCLOBENZAPRINE HCL 5 MG
5 TABLET ORAL EVERY 8 HOURS PRN
Qty: 90 TABLET | Refills: 1 | Status: SHIPPED | OUTPATIENT
Start: 2018-12-27 | End: 2019-08-21 | Stop reason: SDUPTHER

## 2018-12-27 RX ORDER — MESALAMINE 1.2 G/1
1.2 TABLET, DELAYED RELEASE ORAL 3 TIMES DAILY
Qty: 90 TABLET | Refills: 2 | Status: CANCELLED | OUTPATIENT
Start: 2018-12-27

## 2018-12-27 RX ORDER — NAPROXEN 500 MG/1
500 TABLET ORAL 2 TIMES DAILY PRN
Qty: 60 TABLET | Refills: 2 | Status: SHIPPED | OUTPATIENT
Start: 2018-12-27 | End: 2019-06-12

## 2018-12-27 RX ORDER — FENOFIBRATE 145 MG/1
145 TABLET, COATED ORAL DAILY
Qty: 30 TABLET | Refills: 5 | Status: CANCELLED | OUTPATIENT
Start: 2018-12-27

## 2018-12-27 RX ORDER — PREDNISONE 2.5 MG
2.5 TABLET ORAL DAILY
Qty: 30 TABLET | Refills: 5 | Status: CANCELLED | OUTPATIENT
Start: 2018-12-27

## 2018-12-27 RX ORDER — NAPROXEN 500 MG/1
500 TABLET ORAL 2 TIMES DAILY PRN
Qty: 60 TABLET | Refills: 2 | Status: CANCELLED | OUTPATIENT
Start: 2018-12-27

## 2018-12-27 RX ORDER — FENOFIBRATE 145 MG/1
145 TABLET, COATED ORAL DAILY
Qty: 30 TABLET | Refills: 5 | Status: SHIPPED | OUTPATIENT
Start: 2018-12-27 | End: 2019-05-30 | Stop reason: SDUPTHER

## 2018-12-27 RX ORDER — PREDNISONE 2.5 MG
2.5 TABLET ORAL DAILY
Qty: 30 TABLET | Refills: 5 | Status: SHIPPED | OUTPATIENT
Start: 2018-12-27 | End: 2019-06-12 | Stop reason: SDUPTHER

## 2018-12-27 RX ORDER — CYCLOBENZAPRINE HCL 5 MG
5 TABLET ORAL EVERY 8 HOURS PRN
Qty: 90 TABLET | Refills: 1 | Status: CANCELLED | OUTPATIENT
Start: 2018-12-27

## 2018-12-27 RX ORDER — MESALAMINE 1.2 G/1
1.2 TABLET, DELAYED RELEASE ORAL 3 TIMES DAILY
Qty: 90 TABLET | Refills: 5 | Status: SHIPPED | OUTPATIENT
Start: 2018-12-27 | End: 2019-05-30 | Stop reason: SDUPTHER

## 2019-05-03 LAB
AVERAGE GLUCOSE: 137
HBA1C MFR BLD: 6.4 %

## 2019-05-29 ENCOUNTER — PATIENT MESSAGE (OUTPATIENT)
Dept: FAMILY MEDICINE CLINIC | Age: 64
End: 2019-05-29

## 2019-05-29 DIAGNOSIS — E78.2 MIXED HYPERLIPIDEMIA: ICD-10-CM

## 2019-05-29 DIAGNOSIS — K51.90 ULCERATIVE COLITIS WITHOUT COMPLICATIONS, UNSPECIFIED LOCATION (HCC): ICD-10-CM

## 2019-05-30 RX ORDER — CHLORAL HYDRATE 500 MG
3000 CAPSULE ORAL DAILY
Qty: 90 CAPSULE | Refills: 6 | Status: SHIPPED | OUTPATIENT
Start: 2019-05-30

## 2019-05-30 RX ORDER — FENOFIBRATE 145 MG/1
145 TABLET, COATED ORAL DAILY
Qty: 30 TABLET | Refills: 5 | Status: SHIPPED | OUTPATIENT
Start: 2019-05-30 | End: 2019-06-12 | Stop reason: SDUPTHER

## 2019-05-30 RX ORDER — MESALAMINE 1.2 G/1
1.2 TABLET, DELAYED RELEASE ORAL 3 TIMES DAILY
Qty: 90 TABLET | Refills: 5 | Status: SHIPPED | OUTPATIENT
Start: 2019-05-30 | End: 2019-06-12 | Stop reason: SDUPTHER

## 2019-05-30 RX ORDER — MULTIVITAMIN WITH FOLIC ACID 400 MCG
1 TABLET ORAL DAILY
Qty: 90 TABLET | Refills: 1 | Status: SHIPPED | OUTPATIENT
Start: 2019-05-30 | End: 2019-06-12

## 2019-06-07 LAB
AVERAGE GLUCOSE: 134
HBA1C MFR BLD: 6.4 %

## 2019-06-11 RX ORDER — AMMONIUM LACTATE 12 G/100G
CREAM TOPICAL 2 TIMES DAILY
COMMUNITY
End: 2019-06-12

## 2019-06-11 RX ORDER — ALPRAZOLAM 0.25 MG/1
0.25 TABLET ORAL PRN
COMMUNITY
End: 2019-12-09

## 2019-06-12 ENCOUNTER — OFFICE VISIT (OUTPATIENT)
Dept: FAMILY MEDICINE CLINIC | Age: 64
End: 2019-06-12
Payer: MEDICAID

## 2019-06-12 VITALS
HEART RATE: 73 BPM | DIASTOLIC BLOOD PRESSURE: 72 MMHG | HEIGHT: 74 IN | WEIGHT: 242 LBS | BODY MASS INDEX: 31.06 KG/M2 | SYSTOLIC BLOOD PRESSURE: 158 MMHG | OXYGEN SATURATION: 98 % | TEMPERATURE: 98 F

## 2019-06-12 DIAGNOSIS — E11.9 DIABETES MELLITUS TYPE 2, INSULIN DEPENDENT (HCC): Primary | ICD-10-CM

## 2019-06-12 DIAGNOSIS — I10 ESSENTIAL HYPERTENSION: ICD-10-CM

## 2019-06-12 DIAGNOSIS — K51.90 ULCERATIVE COLITIS WITHOUT COMPLICATIONS, UNSPECIFIED LOCATION (HCC): ICD-10-CM

## 2019-06-12 DIAGNOSIS — E78.2 MIXED HYPERLIPIDEMIA: ICD-10-CM

## 2019-06-12 DIAGNOSIS — I25.118 CORONARY ARTERY DISEASE OF NATIVE ARTERY OF NATIVE HEART WITH STABLE ANGINA PECTORIS (HCC): ICD-10-CM

## 2019-06-12 DIAGNOSIS — Z79.4 DIABETES MELLITUS TYPE 2, INSULIN DEPENDENT (HCC): Primary | ICD-10-CM

## 2019-06-12 PROCEDURE — 99214 OFFICE O/P EST MOD 30 MIN: CPT | Performed by: INTERNAL MEDICINE

## 2019-06-12 RX ORDER — PREDNISONE 2.5 MG
2.5 TABLET ORAL DAILY
Qty: 90 TABLET | Refills: 1 | Status: SHIPPED | OUTPATIENT
Start: 2019-06-12 | End: 2019-12-09 | Stop reason: SDUPTHER

## 2019-06-12 RX ORDER — MESALAMINE 1.2 G/1
1.2 TABLET, DELAYED RELEASE ORAL 3 TIMES DAILY
Qty: 270 TABLET | Refills: 1 | Status: SHIPPED | OUTPATIENT
Start: 2019-06-12 | End: 2019-10-23 | Stop reason: SDUPTHER

## 2019-06-12 RX ORDER — FENOFIBRATE 145 MG/1
145 TABLET, COATED ORAL DAILY
Qty: 90 TABLET | Refills: 1 | Status: SHIPPED | OUTPATIENT
Start: 2019-06-12 | End: 2019-10-03 | Stop reason: SDUPTHER

## 2019-06-12 RX ORDER — NAPROXEN 500 MG/1
500 TABLET ORAL 2 TIMES DAILY WITH MEALS
COMMUNITY
End: 2019-10-03 | Stop reason: SDUPTHER

## 2019-06-12 ASSESSMENT — PATIENT HEALTH QUESTIONNAIRE - PHQ9
SUM OF ALL RESPONSES TO PHQ QUESTIONS 1-9: 0
SUM OF ALL RESPONSES TO PHQ QUESTIONS 1-9: 0
2. FEELING DOWN, DEPRESSED OR HOPELESS: 0
SUM OF ALL RESPONSES TO PHQ9 QUESTIONS 1 & 2: 0
1. LITTLE INTEREST OR PLEASURE IN DOING THINGS: 0

## 2019-06-12 NOTE — PROGRESS NOTES
[id number] [facility] [address] [facility]     19  Shanae Hatch : 1955 Sex: male  Age: 61 y.o. Chief Complaint   Patient presents with    Hypertension     4m   Follow-up multiple medical problems. HPI: Patient presents today for follow-up visit of his multiple medical problems. He states his blood pressures at home have been okay. Does bring his machine in today for comparison much higher than what we got here in the office. I told him it is time to get a new machine and continue monitoring closely. Blood sugars have been good. He does follow with pharmacist with [facility] who manages his blood sugars. His ulcerative colitis has been quiescent. He is up-to-date with his consultants including cardiology, ophthalmology, podiatry, GI, urology, pharmacist he is doing well with his obstructive sleep apnea and using his CPAP. He is denying any cardiovascular complaints of chest pain or shortness of breath. He is up-to-date with cardiology as well. Review of Systems    Const: Denies chills, fever. He does describe some sweats most grass. He does not describe any chest pain or shortness of breath with these episodes nor is his blood sugar low. Eyes: Denies eye symptoms States he is up to date with his eyes  ENMT: Denies ear symptoms. Reports postnasal drip, but denies other nasal symptoms. Denies  mouth or throat symptoms. CV: Denies chest pain, orthopnea and palpitations. Resp: Denies cough, SOB and wheezing. GI: Denies diarrhea, nausea and vomiting. : Urinary: denies dysuria, frequency and frequent UTI's. Musculo: Reports arthritis. Skin: Denies eczema, pruritus and sores. Neuro: Denies dizziness, headache, seizures and syncope. Psych: Negative  REST OF PERTINENT ROS GONE OVER AND WAS NEGATIVE.    PMH:  Problem List: Chronic ulcerative enterocolitis, Hyperlipidemia, Type 2 diabetes mellitus, Benign essential  hypertension  Health Maintenance:  Bone Density Scan - (10/16/2014)  Colonoscopy Screening - (10/20/2014)  Colonoscopy - (2/21/2008)  Colonoscopy - (5/28/2010)  Colonoscopy - (6/29/2012)  Colonoscopy - (10/20/2014)  Influenza Vaccination - (9/11/2015)  Colonoscopy - 2/08,5/10,12/11,6/12,10/14-Dr swartz  Rectal Exam - 5/09,9/10,12/11,7/13-declined  PSA Test - 5/09,9/10,4/12,7/13  Prostate Exam - 5/09,9/10,12/11,7/13-declined  EKG - 5/09,11/13  EGD - Dr. Jillian Ch Problems:  Ulcerative Colitis - follows with kolomaria de jesus  Hyperlipidemia  chronic right shoulder pain - ac arthritis and rotator cuff tear  olecranon septic bursitis, Hypertension, Type 2 Diabetes  Headache - cluster  microdiscectomy L4L5  Sleep Apnea - uvulectomy- can't wear mask-declined further eval  Osteopenia  microscopic hematuria - Was evaluated by urology. Diverticulosis, Proteinuria, Colon Polyps, poor compliance  CAD - CABG 2017  Barrettes Esophagitis, Fatty Liver, Kidney Stones  Follows with - Cardiology ,pharmacist, ophthalmology, GI, urology  FH:  Father:  Coronary Artery Disease (CAD), Non Insulin Dependent Diabetes. Mother:  CLL, Hypertension, Chronic Obstructive Pulmonary Disease (COPD). Sister 1:  osteopenia. Paternal Uncle 1:  Lung Cancer. SH:  . (Marital)  Personal Habits: Cigarette Use: vapes. Alcohol: Current Alcohol Use    Current Outpatient Medications:     empagliflozin (JARDIANCE) 25 MG tablet, Take 25 mg by mouth daily, Disp: , Rfl:     naproxen (NAPROSYN) 500 MG tablet, Take 500 mg by mouth 2 times daily (with meals), Disp: , Rfl:     insulin glargine (BASAGLAR KWIKPEN) 100 UNIT/ML injection pen, Inject into the skin nightly Up to 75U 4times a day, Disp: , Rfl:     predniSONE (DELTASONE) 2.5 MG tablet, Take 1 tablet by mouth daily, Disp: 90 tablet, Rfl: 1    mesalamine (LIALDA) 1.2 g EC tablet, Take 1 tablet by mouth 3 times daily, Disp: 270 tablet, Rfl: 1    fenofibrate (TRICOR) 145 MG tablet, Take 1 tablet by mouth daily, Disp: 90 tablet, Rfl: 1    ALPRAZolam (XANAX) 0.25 MG tablet, Take 0.25 mg native heart with stable angina pectoris ([facility] [address]     [facility]     Diverticulosis     Headache     Hyperlipidemia     Hypertension     Osteopenia     Sleep apnea      Past Surgical History:   Procedure Laterality Date    APPENDECTOMY      BACK SURGERY      CARDIAC CATHETERIZATION  2017    Dr. Alvarado Fu GRAFT  04/06/2017    x2    ENDOSCOPY, COLON, DIAGNOSTIC      FRACTURE SURGERY      wrist    FRACTURE SURGERY      left tibia    FRACTURE SURGERY      fingers    FRACTURE SURGERY      collar bone    UVULECTOMY       Family History   Problem Relation Age of Onset    Asthma Mother     Diabetes Father     Heart Disease Father     Coronary Art Dis Father      Social History     Socioeconomic History    Marital status:      Spouse name: Not on file    Number of children: Not on file    Years of education: Not on file    Highest education level: Not on file   Occupational History    Not on file   Social Needs    Financial resource strain: Not on file    Food insecurity:     Worry: Not on file     Inability: Not on file    Transportation needs:     Medical: Not on file     Non-medical: Not on file   Tobacco Use    Smoking status: Former Smoker     Packs/day: 1.50     Years: 35.00     Pack years: 52.50     Types: E-Cigarettes, Cigarettes     Last attempt to quit: 2015     Years since quittin.4    Smokeless tobacco: Never Used    Tobacco comment: currently uses vapor cigarettes   Substance and Sexual Activity    Alcohol use:  Yes     Alcohol/week: 6.0 oz     Types: 10 Cans of beer per week     Comment: occasionally    Drug use: No    Sexual activity: Not on file   Lifestyle    Physical activity:     Days per week: Not on file     Minutes per session: Not on file    Stress: Not on file   Relationships    Social connections:     Talks on phone: Not on file     Gets together: Not on file     Attends Nondenominational service: Not on file     Active member of club or organization: Not on file     Attends meetings of clubs or organizations: Not on file     Relationship status: Not on file    Intimate partner violence:     Fear of current or ex partner: Not on file     Emotionally abused: Not on file     Physically abused: Not on file     Forced sexual activity: Not on file   Other Topics Concern    Not on file   Social History Narrative    Not on file       Vitals:    06/12/19 0701 06/12/19 0712   BP: 118/80 (!) 158/72   Site:  Right Upper Arm   Pulse: 73    Temp: 98 °F (36.7 °C)    TempSrc: Temporal    SpO2: 98%    Weight: 242 lb (109.8 kg)    Height: 6' 2\" (1.88 m)        Physical Exam   Constitutional: He is oriented to person, place, and time. He appears well-developed and well-nourished. No distress. Overweight   Neck: Normal range of motion. Neck supple. No JVD present. No thyromegaly present. Cardiovascular: Normal rate, regular rhythm, normal heart sounds and intact distal pulses. Exam reveals no gallop and no friction rub. No murmur heard. Mid sternotomy scar from his CABG   Pulmonary/Chest: Effort normal and breath sounds normal. No respiratory distress. He has no wheezes. He has no rales. Abdominal: Soft. Bowel sounds are normal. He exhibits no distension and no mass. There is no tenderness. Musculoskeletal: Normal range of motion. He exhibits edema. He exhibits no deformity. Trace   Lymphadenopathy:     He has no cervical adenopathy. Neurological: He is alert and oriented to person, place, and time. No sensory deficit. He exhibits normal muscle tone. Coordination normal.   Skin: Skin is warm and dry. Psychiatric: He has a normal mood and affect. His behavior is normal.   Nursing note and vitals reviewed. Assessment and Plan:  Nancy Cruz was seen today for hypertension.     Diagnoses and all orders for this visit:    Diabetes mellitus type 2, insulin dependent ([facility] [address]  -     Cancel: Microalbumin / Creatinine Urine Ratio; Future  -     Microalbumin / Creatinine Urine Ratio; Future    Ulcerative colitis without complications, unspecified location ([facility]  -     predniSONE (DELTASONE) 2.5 MG tablet; Take 1 tablet by mouth daily  -     mesalamine (LIALDA) 1.2 g EC tablet; Take 1 tablet by mouth 3 times daily    Mixed hyperlipidemia  -     fenofibrate (TRICOR) 145 MG tablet; Take 1 tablet by mouth daily  -     Cancel: Lipid Panel; Future  -     Lipid Panel; Future    Coronary artery disease of native artery of native heart with stable angina pectoris (HCC)    Essential hypertension  -     Cancel: CBC Auto Differential; Future  -     Cancel: Comprehensive Metabolic Panel; Future  -     CBC Auto Differential; Future  -     Comprehensive Metabolic Panel; Future    Plan: I will see patient back in 3 months and as needed. Blood work today to monitor disease progression and medication use. Copy to GI. Prescription management performed. Weight loss attempts. I did ask him to  a new blood pressure monitor. Notify us with problems in the interim. Follow-up with the above consultants as directed. Return in about 3 months (around 9/12/2019). Seen By:  Sarah Martinez MD      *Document was created using voice recognition software. Note was reviewed however may contain grammatical errors.

## 2019-06-13 LAB
ALBUMIN SERPL-MCNC: NORMAL G/DL
ALP BLD-CCNC: NORMAL U/L
ALT SERPL-CCNC: NORMAL U/L
ANION GAP SERPL CALCULATED.3IONS-SCNC: NORMAL MMOL/L
AST SERPL-CCNC: NORMAL U/L
BASOPHILS ABSOLUTE: NORMAL /ΜL
BASOPHILS RELATIVE PERCENT: NORMAL %
BILIRUB SERPL-MCNC: NORMAL MG/DL (ref 0.1–1.4)
BUN BLDV-MCNC: NORMAL MG/DL
CALCIUM SERPL-MCNC: NORMAL MG/DL
CHLORIDE BLD-SCNC: NORMAL MMOL/L
CO2: NORMAL MMOL/L
CREAT SERPL-MCNC: NORMAL MG/DL
CREATININE, RANDOM URINE: NORMAL
EOSINOPHILS ABSOLUTE: NORMAL /ΜL
EOSINOPHILS RELATIVE PERCENT: NORMAL %
GFR CALCULATED: NORMAL
GLUCOSE BLD-MCNC: NORMAL MG/DL
HCT VFR BLD CALC: NORMAL % (ref 41–53)
HEMOGLOBIN: NORMAL G/DL (ref 13.5–17.5)
LYMPHOCYTES ABSOLUTE: NORMAL /ΜL
LYMPHOCYTES RELATIVE PERCENT: NORMAL %
MCH RBC QN AUTO: NORMAL PG
MCHC RBC AUTO-ENTMCNC: NORMAL G/DL
MCV RBC AUTO: NORMAL FL
MONOCYTES ABSOLUTE: NORMAL /ΜL
MONOCYTES RELATIVE PERCENT: NORMAL %
NEUTROPHILS ABSOLUTE: NORMAL /ΜL
NEUTROPHILS RELATIVE PERCENT: NORMAL %
PDW BLD-RTO: NORMAL %
PLATELET # BLD: NORMAL K/ΜL
PMV BLD AUTO: NORMAL FL
POTASSIUM SERPL-SCNC: NORMAL MMOL/L
RBC # BLD: NORMAL 10^6/ΜL
SODIUM BLD-SCNC: NORMAL MMOL/L
TOTAL PROTEIN: NORMAL
WBC # BLD: NORMAL 10^3/ML

## 2019-06-24 DIAGNOSIS — I10 ESSENTIAL HYPERTENSION: ICD-10-CM

## 2019-07-02 ENCOUNTER — PROCEDURE VISIT (OUTPATIENT)
Dept: PODIATRY | Age: 64
End: 2019-07-02
Payer: MEDICAID

## 2019-07-02 VITALS — BODY MASS INDEX: 31.06 KG/M2 | HEIGHT: 74 IN | WEIGHT: 242 LBS

## 2019-07-02 DIAGNOSIS — L85.3 XEROSIS CUTIS: Primary | ICD-10-CM

## 2019-07-02 DIAGNOSIS — B35.3 TINEA PEDIS OF BOTH FEET: ICD-10-CM

## 2019-07-02 PROCEDURE — 99213 OFFICE O/P EST LOW 20 MIN: CPT | Performed by: PODIATRIST

## 2019-07-02 NOTE — PROGRESS NOTES
Patient in today for diabetic foot check. Patient complains of dry skin on bilateral foot and swelling.  pcp is Sue Fernandez MD last ov 6-12-19
times a day, Disp: 90 tablet, Rfl: 3    atorvastatin (LIPITOR) 40 MG tablet, Take 1 tablet by mouth daily, Disp: 30 tablet, Rfl: 3    vitamin D (ERGOCALCIFEROL) 36054 units CAPS capsule, Take 1 capsule by mouth every 30 days, Disp: 30 capsule, Rfl: 3    ONETOUCH DELICA LANCETS 19G MISC, CHECK SUGAR 3 TIMES A DAY, Disp: 100 each, Rfl: 6    amLODIPine (NORVASC) 10 MG tablet, Take 1 tablet by mouth daily, Disp: 30 tablet, Rfl: 6    ascorbic acid (VITAMIN C) 1000 MG tablet, Take 1 tablet by mouth 2 times daily, Disp: 60 tablet, Rfl: 6    ONE TOUCH ULTRA TEST strip, TEST four times a day, Disp: 100 strip, Rfl: 8    Insulin Pen Needle 31G X 8 MM MISC, For injections of insulin 4 times a day, Disp: 120 each, Rfl: 5    metFORMIN (GLUCOPHAGE) 500 MG tablet, Take 500 mg by mouth 2 times daily (with meals), Disp: , Rfl:     Dulaglutide (TRULICITY) 1.5 MG/2.4RF SOPN, Inject into the skin, Disp: , Rfl:     aspirin 81 MG tablet, Take 81 mg by mouth daily, Disp: , Rfl:     insulin glargine (TOUJEO SOLOSTAR) 300 UNIT/ML injection pen, Inject 75 Units into the skin daily , Disp: , Rfl:     metoprolol tartrate (LOPRESSOR) 25 MG tablet, Take 1 tablet by mouth 2 times daily, Disp: 60 tablet, Rfl: 3    magnesium oxide (MAG-OX) 400 (240 MG) MG tablet, Take 1 tablet by mouth daily for 14 days, Disp: 14 tablet, Rfl: 0    Allergies: Allergies   Allergen Reactions    Lisinopril Swelling     Tongue swells     Ace Inhibitors        Vitals:    07/02/19 0838   Weight: 242 lb (109.8 kg)   Height: 6' 2\" (1.88 m)       Exam:  Neurovascular status unchanged. Mild dryness and scaliness noted to the plantar aspect of both feet without heel fissuring noted. No signs of infection noted bilateral lower extremities. Localized swelling noted into the bilateral ankle regions right greater than left. No varicosities noted to both lower extremities. Diagnostic Studies:     No results found.       Procedures:    None    Plan Per

## 2019-08-02 LAB
AVERAGE GLUCOSE: 105
AVERAGE GLUCOSE: NORMAL
GLUCOSE BLD-MCNC: 126 MG/DL
HBA1C MFR BLD: 6.2 %
HBA1C MFR BLD: 6.2 %

## 2019-08-21 DIAGNOSIS — Z79.4 DIABETES MELLITUS TYPE 2, INSULIN DEPENDENT (HCC): Primary | ICD-10-CM

## 2019-08-21 DIAGNOSIS — E11.9 DIABETES MELLITUS TYPE 2, INSULIN DEPENDENT (HCC): Primary | ICD-10-CM

## 2019-08-21 DIAGNOSIS — M62.830 SPASM OF MUSCLE OF LOWER BACK: ICD-10-CM

## 2019-08-21 DIAGNOSIS — E78.2 MIXED HYPERLIPIDEMIA: ICD-10-CM

## 2019-08-21 DIAGNOSIS — I10 ESSENTIAL HYPERTENSION: ICD-10-CM

## 2019-08-21 RX ORDER — PANTOPRAZOLE SODIUM 40 MG/1
TABLET, DELAYED RELEASE ORAL
Refills: 0 | COMMUNITY
Start: 2019-07-19 | End: 2019-08-21 | Stop reason: SDUPTHER

## 2019-08-21 RX ORDER — MULTIVITAMIN WITH FOLIC ACID 400 MCG
TABLET ORAL
Refills: 0 | COMMUNITY
Start: 2019-07-24 | End: 2019-08-21 | Stop reason: SDUPTHER

## 2019-08-21 RX ORDER — GLUCOSAMINE/CHONDR SU A SOD 167-133 MG
100 CAPSULE ORAL DAILY
Qty: 90 TABLET | Refills: 1 | Status: SHIPPED
Start: 2019-08-21 | End: 2020-02-13 | Stop reason: SDUPTHER

## 2019-08-21 RX ORDER — ATORVASTATIN CALCIUM 40 MG/1
40 TABLET, FILM COATED ORAL DAILY
Qty: 90 TABLET | Refills: 1 | Status: SHIPPED | OUTPATIENT
Start: 2019-08-21 | End: 2019-10-23 | Stop reason: SDUPTHER

## 2019-08-21 RX ORDER — AMLODIPINE BESYLATE 10 MG/1
10 TABLET ORAL DAILY
Qty: 90 TABLET | Refills: 1 | Status: SHIPPED | OUTPATIENT
Start: 2019-08-21 | End: 2019-12-09 | Stop reason: SDUPTHER

## 2019-08-21 RX ORDER — PANTOPRAZOLE SODIUM 40 MG/1
40 TABLET, DELAYED RELEASE ORAL DAILY
Qty: 90 TABLET | Refills: 1 | Status: SHIPPED | OUTPATIENT
Start: 2019-08-21 | End: 2019-12-09 | Stop reason: SDUPTHER

## 2019-08-21 RX ORDER — MULTIVITAMIN WITH FOLIC ACID 400 MCG
1 TABLET ORAL DAILY
Qty: 90 TABLET | Refills: 1 | Status: SHIPPED
Start: 2019-08-21 | End: 2020-02-13 | Stop reason: SDUPTHER

## 2019-08-21 RX ORDER — CYCLOBENZAPRINE HCL 5 MG
5 TABLET ORAL EVERY 8 HOURS PRN
Qty: 270 TABLET | Refills: 0 | Status: SHIPPED | OUTPATIENT
Start: 2019-08-21 | End: 2019-12-16 | Stop reason: SDUPTHER

## 2019-08-21 RX ORDER — GLUCOSAMINE/CHONDR SU A SOD 167-133 MG
CAPSULE ORAL
Refills: 0 | COMMUNITY
Start: 2019-06-18 | End: 2019-08-21 | Stop reason: SDUPTHER

## 2019-08-26 DIAGNOSIS — Z79.4 DIABETES MELLITUS TYPE 2, INSULIN DEPENDENT (HCC): Primary | ICD-10-CM

## 2019-08-26 DIAGNOSIS — E11.9 DIABETES MELLITUS TYPE 2, INSULIN DEPENDENT (HCC): Primary | ICD-10-CM

## 2019-09-04 DIAGNOSIS — E11.9 DIABETES MELLITUS TYPE 2, INSULIN DEPENDENT (HCC): ICD-10-CM

## 2019-09-04 DIAGNOSIS — Z79.4 DIABETES MELLITUS TYPE 2, INSULIN DEPENDENT (HCC): ICD-10-CM

## 2019-09-12 ENCOUNTER — OFFICE VISIT (OUTPATIENT)
Dept: FAMILY MEDICINE CLINIC | Age: 64
End: 2019-09-12
Payer: MEDICAID

## 2019-09-12 VITALS
WEIGHT: 249 LBS | OXYGEN SATURATION: 98 % | BODY MASS INDEX: 31.97 KG/M2 | SYSTOLIC BLOOD PRESSURE: 130 MMHG | HEART RATE: 84 BPM | DIASTOLIC BLOOD PRESSURE: 68 MMHG

## 2019-09-12 DIAGNOSIS — I10 ESSENTIAL HYPERTENSION: ICD-10-CM

## 2019-09-12 DIAGNOSIS — Z23 IMMUNIZATION DUE: ICD-10-CM

## 2019-09-12 DIAGNOSIS — I25.118 CORONARY ARTERY DISEASE OF NATIVE ARTERY OF NATIVE HEART WITH STABLE ANGINA PECTORIS (HCC): ICD-10-CM

## 2019-09-12 DIAGNOSIS — G47.33 SLEEP APNEA, OBSTRUCTIVE: ICD-10-CM

## 2019-09-12 DIAGNOSIS — E78.2 MIXED HYPERLIPIDEMIA: ICD-10-CM

## 2019-09-12 DIAGNOSIS — E11.9 DIABETES MELLITUS TYPE 2, INSULIN DEPENDENT (HCC): Primary | ICD-10-CM

## 2019-09-12 DIAGNOSIS — Z79.4 DIABETES MELLITUS TYPE 2, INSULIN DEPENDENT (HCC): Primary | ICD-10-CM

## 2019-09-12 PROCEDURE — 90686 IIV4 VACC NO PRSV 0.5 ML IM: CPT | Performed by: INTERNAL MEDICINE

## 2019-09-12 PROCEDURE — 99214 OFFICE O/P EST MOD 30 MIN: CPT | Performed by: INTERNAL MEDICINE

## 2019-09-12 PROCEDURE — 90471 IMMUNIZATION ADMIN: CPT | Performed by: INTERNAL MEDICINE

## 2019-09-12 NOTE — PROGRESS NOTES
muscle tone. Coordination normal.   Skin: Skin is warm and dry. Does have several nevi on his back. I did ask him to see dermatology at least once for an evaluation to make sure none of these need biopsy. He is going to call I told him let me know if he needs referral  Psychiatric: He has a normal mood and affect. His behavior is normal.   Nursing note and vitals reviewed. Assessment and Plan:  Nataly Ochoa was seen today for diabetes. Diagnoses and all orders for this visit:    Diabetes mellitus type 2, insulin dependent ([address]    Essential hypertension    Mixed hyperlipidemia    Coronary artery disease of native artery of native heart with stable angina pectoris (HCC)    Sleep apnea, obstructive    Plan: I will see back in 3 months and as needed. Reviewed all of his blood work from last visit. Nothing will be checked today but will check everything fasting next time around. Can you follow with above consultants. Weight loss attempts. Continue to monitor blood pressure and blood sugar closely. Last hemoglobin A1c was 6.2. See above body report. Did ask him to see dermatology. Notify us with problems in the interim. Return in about 3 months (around 12/12/2019). Seen By:  Martha Cruz MD      *Document was created using voice recognition software. Note was reviewed however may contain grammatical errors.

## 2019-10-03 DIAGNOSIS — M25.50 ARTHRALGIA, UNSPECIFIED JOINT: Primary | ICD-10-CM

## 2019-10-03 DIAGNOSIS — E78.2 MIXED HYPERLIPIDEMIA: ICD-10-CM

## 2019-10-03 RX ORDER — FENOFIBRATE 145 MG/1
145 TABLET, COATED ORAL DAILY
Qty: 30 TABLET | Refills: 2 | Status: SHIPPED | OUTPATIENT
Start: 2019-10-03 | End: 2019-10-23 | Stop reason: SDUPTHER

## 2019-10-03 RX ORDER — NAPROXEN 500 MG/1
500 TABLET ORAL 2 TIMES DAILY PRN
Qty: 30 TABLET | Refills: 0 | Status: SHIPPED | OUTPATIENT
Start: 2019-10-03 | End: 2019-10-23 | Stop reason: SDUPTHER

## 2019-10-23 DIAGNOSIS — M25.50 ARTHRALGIA, UNSPECIFIED JOINT: ICD-10-CM

## 2019-10-23 DIAGNOSIS — E78.2 MIXED HYPERLIPIDEMIA: ICD-10-CM

## 2019-10-23 DIAGNOSIS — K51.90 ULCERATIVE COLITIS WITHOUT COMPLICATIONS, UNSPECIFIED LOCATION (HCC): ICD-10-CM

## 2019-10-28 RX ORDER — MESALAMINE 1.2 G/1
1.2 TABLET, DELAYED RELEASE ORAL 3 TIMES DAILY
Qty: 270 TABLET | Refills: 1 | Status: SHIPPED
Start: 2019-10-28 | End: 2020-03-02 | Stop reason: SDUPTHER

## 2019-10-28 RX ORDER — NAPROXEN 500 MG/1
500 TABLET ORAL 2 TIMES DAILY PRN
Qty: 30 TABLET | Refills: 0 | Status: SHIPPED | OUTPATIENT
Start: 2019-10-28 | End: 2019-12-09

## 2019-10-28 RX ORDER — FENOFIBRATE 145 MG/1
145 TABLET, COATED ORAL DAILY
Qty: 30 TABLET | Refills: 2 | Status: SHIPPED | OUTPATIENT
Start: 2019-10-28 | End: 2019-12-09 | Stop reason: SDUPTHER

## 2019-10-28 RX ORDER — ATORVASTATIN CALCIUM 40 MG/1
40 TABLET, FILM COATED ORAL DAILY
Qty: 90 TABLET | Refills: 1 | Status: SHIPPED | OUTPATIENT
Start: 2019-10-28 | End: 2019-12-09 | Stop reason: SDUPTHER

## 2019-11-15 ENCOUNTER — TELEPHONE (OUTPATIENT)
Dept: FAMILY MEDICINE CLINIC | Age: 64
End: 2019-11-15

## 2019-11-15 RX ORDER — NAPROXEN 500 MG/1
500 TABLET ORAL 2 TIMES DAILY PRN
Qty: 60 TABLET | Refills: 1 | Status: SHIPPED | OUTPATIENT
Start: 2019-11-15 | End: 2019-12-16 | Stop reason: SDUPTHER

## 2019-11-24 DIAGNOSIS — M62.830 SPASM OF MUSCLE OF LOWER BACK: ICD-10-CM

## 2019-11-25 RX ORDER — CYCLOBENZAPRINE HCL 5 MG
TABLET ORAL
Qty: 270 TABLET | Refills: 0 | OUTPATIENT
Start: 2019-11-25

## 2019-12-09 ENCOUNTER — OFFICE VISIT (OUTPATIENT)
Dept: FAMILY MEDICINE CLINIC | Age: 64
End: 2019-12-09
Payer: MEDICAID

## 2019-12-09 VITALS
HEART RATE: 72 BPM | WEIGHT: 252 LBS | DIASTOLIC BLOOD PRESSURE: 82 MMHG | BODY MASS INDEX: 32.35 KG/M2 | SYSTOLIC BLOOD PRESSURE: 132 MMHG | OXYGEN SATURATION: 92 %

## 2019-12-09 DIAGNOSIS — E78.2 MIXED HYPERLIPIDEMIA: ICD-10-CM

## 2019-12-09 DIAGNOSIS — R60.0 LOCALIZED EDEMA: ICD-10-CM

## 2019-12-09 DIAGNOSIS — I25.118 CORONARY ARTERY DISEASE OF NATIVE ARTERY OF NATIVE HEART WITH STABLE ANGINA PECTORIS (HCC): ICD-10-CM

## 2019-12-09 DIAGNOSIS — R06.02 SOB (SHORTNESS OF BREATH): Primary | ICD-10-CM

## 2019-12-09 DIAGNOSIS — I10 ESSENTIAL HYPERTENSION: ICD-10-CM

## 2019-12-09 DIAGNOSIS — F41.9 ANXIETY: ICD-10-CM

## 2019-12-09 DIAGNOSIS — E11.9 DIABETES MELLITUS TYPE 2, INSULIN DEPENDENT (HCC): ICD-10-CM

## 2019-12-09 DIAGNOSIS — Z79.4 DIABETES MELLITUS TYPE 2, INSULIN DEPENDENT (HCC): ICD-10-CM

## 2019-12-09 DIAGNOSIS — G47.33 SLEEP APNEA, OBSTRUCTIVE: ICD-10-CM

## 2019-12-09 DIAGNOSIS — E55.9 VITAMIN D DEFICIENCY: ICD-10-CM

## 2019-12-09 DIAGNOSIS — K51.90 ULCERATIVE COLITIS WITHOUT COMPLICATIONS, UNSPECIFIED LOCATION (HCC): ICD-10-CM

## 2019-12-09 DIAGNOSIS — K21.9 GASTROESOPHAGEAL REFLUX DISEASE WITHOUT ESOPHAGITIS: ICD-10-CM

## 2019-12-09 PROCEDURE — 2022F DILAT RTA XM EVC RTNOPTHY: CPT | Performed by: INTERNAL MEDICINE

## 2019-12-09 PROCEDURE — G8482 FLU IMMUNIZE ORDER/ADMIN: HCPCS | Performed by: INTERNAL MEDICINE

## 2019-12-09 PROCEDURE — G8598 ASA/ANTIPLAT THER USED: HCPCS | Performed by: INTERNAL MEDICINE

## 2019-12-09 PROCEDURE — G8427 DOCREV CUR MEDS BY ELIG CLIN: HCPCS | Performed by: INTERNAL MEDICINE

## 2019-12-09 PROCEDURE — 3017F COLORECTAL CA SCREEN DOC REV: CPT | Performed by: INTERNAL MEDICINE

## 2019-12-09 PROCEDURE — 3044F HG A1C LEVEL LT 7.0%: CPT | Performed by: INTERNAL MEDICINE

## 2019-12-09 PROCEDURE — 1036F TOBACCO NON-USER: CPT | Performed by: INTERNAL MEDICINE

## 2019-12-09 PROCEDURE — 99214 OFFICE O/P EST MOD 30 MIN: CPT | Performed by: INTERNAL MEDICINE

## 2019-12-09 PROCEDURE — G8417 CALC BMI ABV UP PARAM F/U: HCPCS | Performed by: INTERNAL MEDICINE

## 2019-12-09 RX ORDER — AZATHIOPRINE 50 MG/1
TABLET ORAL
Qty: 90 TABLET | Refills: 5 | Status: CANCELLED | OUTPATIENT
Start: 2019-12-09

## 2019-12-09 RX ORDER — PANTOPRAZOLE SODIUM 40 MG/1
40 TABLET, DELAYED RELEASE ORAL DAILY
Qty: 30 TABLET | Refills: 5 | Status: SHIPPED
Start: 2019-12-09 | End: 2020-03-02 | Stop reason: SDUPTHER

## 2019-12-09 RX ORDER — PREDNISONE 2.5 MG
2.5 TABLET ORAL DAILY
Qty: 30 TABLET | Refills: 5 | Status: SHIPPED
Start: 2019-12-09 | End: 2020-05-27 | Stop reason: SDUPTHER

## 2019-12-09 RX ORDER — ERGOCALCIFEROL 1.25 MG/1
50000 CAPSULE ORAL
Qty: 6 CAPSULE | Refills: 0 | Status: SHIPPED
Start: 2019-12-09 | End: 2020-05-26

## 2019-12-09 RX ORDER — FENOFIBRATE 145 MG/1
145 TABLET, COATED ORAL DAILY
Qty: 30 TABLET | Refills: 5 | Status: SHIPPED
Start: 2019-12-09 | End: 2020-05-27 | Stop reason: SDUPTHER

## 2019-12-09 RX ORDER — MESALAMINE 1.2 G/1
1.2 TABLET, DELAYED RELEASE ORAL 3 TIMES DAILY
Qty: 90 TABLET | Refills: 5 | Status: CANCELLED | OUTPATIENT
Start: 2019-12-09

## 2019-12-09 RX ORDER — AMLODIPINE BESYLATE 10 MG/1
10 TABLET ORAL DAILY
Qty: 30 TABLET | Refills: 5 | Status: SHIPPED
Start: 2019-12-09 | End: 2020-06-08

## 2019-12-09 RX ORDER — ATORVASTATIN CALCIUM 40 MG/1
40 TABLET, FILM COATED ORAL DAILY
Qty: 30 TABLET | Refills: 5 | Status: SHIPPED
Start: 2019-12-09 | End: 2020-05-27 | Stop reason: SDUPTHER

## 2019-12-10 ENCOUNTER — TELEPHONE (OUTPATIENT)
Dept: FAMILY MEDICINE CLINIC | Age: 64
End: 2019-12-10

## 2019-12-10 LAB
A/G RATIO: 1.5 RATIO (ref 1.1–2.2)
ALBUMIN SERPL-MCNC: 4.1 G/DL (ref 3.4–4.8)
ALP BLD-CCNC: 58 U/L (ref 42–121)
ALT SERPL-CCNC: 62 U/L (ref 10–63)
ANION GAP SERPL CALCULATED.3IONS-SCNC: 11 MEQ/L (ref 3–11)
AST SERPL-CCNC: 39 U/L (ref 10–41)
BASOPHILS ABSOLUTE: 0.1 K/UL (ref 0–0.2)
BASOPHILS RELATIVE PERCENT: 0.9 % (ref 0–1.5)
BILIRUB SERPL-MCNC: 0.9 MG/DL (ref 0.3–1.5)
BUN BLDV-MCNC: 19 MG/DL (ref 6–20)
CALCIUM SERPL-MCNC: 9.1 MG/DL (ref 8.5–10.5)
CHLORIDE BLD-SCNC: 104 MEQ/L (ref 98–107)
CHOLESTEROL: 120 MG/DL (ref 140–200)
CO2: 24 MEQ/L (ref 21–31)
CREAT SERPL-MCNC: 1.2 MG/DL (ref 0.6–1.3)
CREATININE URINE: 141.4 MG/DL (ref 22–328)
EOSINOPHILS ABSOLUTE: 0.7 K/UL (ref 0–0.33)
EOSINOPHILS RELATIVE PERCENT: 7.7 % (ref 0–3)
GFR AFRICAN AMERICAN: 74 ML/MIN
GFR NON-AFRICAN AMERICAN: 61 ML/MIN
GLOBULIN: 2.8 G/DL (ref 1.9–3.9)
GLUCOSE BLD-MCNC: 79 MG/DL (ref 70–99)
HCT VFR BLD CALC: 48.7 % (ref 41–50)
HDLC SERPL-MCNC: 36 MG/DL (ref 29–71)
HEMOGLOBIN: 16 G/DL (ref 13.5–16.5)
LDL CHOLESTEROL: 67 MG/DL
LDL/HDL RATIO: 1.9 RATIO
LYMPHOCYTES ABSOLUTE: 2 K/UL (ref 1.1–4.8)
LYMPHOCYTES RELATIVE PERCENT: 23.2 % (ref 24–44)
MCH RBC QN AUTO: 31.1 PG (ref 28–34)
MCHC RBC AUTO-ENTMCNC: 32.8 G/DL (ref 33–37)
MCV RBC AUTO: 94.9 FL (ref 80–100)
MICROALBUMIN UR-MCNC: 28.1 UG/ML
MICROALBUMIN/CREAT UR-RTO: 19.9 MG/G
MONOCYTES ABSOLUTE: 0.8 K/UL (ref 0.2–0.7)
MONOCYTES RELATIVE PERCENT: 9.8 % (ref 3.4–9)
NEUTROPHILS ABSOLUTE: 5 K/UL (ref 1.83–8.7)
PDW BLD-RTO: 14.5 % (ref 10.9–14.3)
PLATELET # BLD: 296 K/UL (ref 150–450)
PMV BLD AUTO: 9 FL (ref 7.4–10.4)
POTASSIUM SERPL-SCNC: 3.7 MEQ/L (ref 3.6–5)
RBC # BLD: 5.14 M/UL (ref 4.5–5.5)
SEGMENTED NEUTROPHILS RELATIVE PERCENT: 58.4 % (ref 40–74)
SODIUM BLD-SCNC: 139 MEQ/L (ref 135–145)
TOTAL PROTEIN: 6.9 G/DL (ref 5.9–7.8)
TRIGL SERPL-MCNC: 164 MG/DL (ref 41–189)
WBC # BLD: 8.6 K/UL (ref 4.5–11)

## 2019-12-16 DIAGNOSIS — E11.9 DIABETES MELLITUS TYPE 2, INSULIN DEPENDENT (HCC): ICD-10-CM

## 2019-12-16 DIAGNOSIS — M62.830 SPASM OF MUSCLE OF LOWER BACK: ICD-10-CM

## 2019-12-16 DIAGNOSIS — Z79.4 DIABETES MELLITUS TYPE 2, INSULIN DEPENDENT (HCC): ICD-10-CM

## 2019-12-16 RX ORDER — CYCLOBENZAPRINE HCL 5 MG
5 TABLET ORAL EVERY 8 HOURS PRN
Qty: 90 TABLET | Refills: 5 | Status: SHIPPED
Start: 2019-12-16 | End: 2020-05-27 | Stop reason: SDUPTHER

## 2019-12-16 RX ORDER — NAPROXEN 500 MG/1
500 TABLET ORAL 2 TIMES DAILY PRN
Qty: 60 TABLET | Refills: 5 | Status: SHIPPED
Start: 2019-12-16 | End: 2020-06-26 | Stop reason: SDUPTHER

## 2020-02-13 RX ORDER — MULTIVITAMIN WITH FOLIC ACID 400 MCG
1 TABLET ORAL DAILY
Qty: 30 TABLET | Refills: 0 | Status: SHIPPED
Start: 2020-02-13 | End: 2020-03-02 | Stop reason: SDUPTHER

## 2020-02-13 RX ORDER — GLUCOSAMINE/CHONDR SU A SOD 167-133 MG
100 CAPSULE ORAL DAILY
Qty: 30 TABLET | Refills: 0 | Status: SHIPPED
Start: 2020-02-13 | End: 2020-03-02 | Stop reason: SDUPTHER

## 2020-02-14 NOTE — TELEPHONE ENCOUNTER
Sent    This karen is another one. Tyler Tapia He needs to get his prescriptions at the visit.     Bring bottles in

## 2020-03-02 ENCOUNTER — OFFICE VISIT (OUTPATIENT)
Dept: FAMILY MEDICINE CLINIC | Age: 65
End: 2020-03-02
Payer: MEDICAID

## 2020-03-02 VITALS
DIASTOLIC BLOOD PRESSURE: 64 MMHG | SYSTOLIC BLOOD PRESSURE: 136 MMHG | OXYGEN SATURATION: 95 % | HEART RATE: 79 BPM | WEIGHT: 242.4 LBS | HEIGHT: 74 IN | BODY MASS INDEX: 31.11 KG/M2

## 2020-03-02 PROCEDURE — G8417 CALC BMI ABV UP PARAM F/U: HCPCS | Performed by: INTERNAL MEDICINE

## 2020-03-02 PROCEDURE — 3017F COLORECTAL CA SCREEN DOC REV: CPT | Performed by: INTERNAL MEDICINE

## 2020-03-02 PROCEDURE — 99214 OFFICE O/P EST MOD 30 MIN: CPT | Performed by: INTERNAL MEDICINE

## 2020-03-02 PROCEDURE — G8427 DOCREV CUR MEDS BY ELIG CLIN: HCPCS | Performed by: INTERNAL MEDICINE

## 2020-03-02 PROCEDURE — 2022F DILAT RTA XM EVC RTNOPTHY: CPT | Performed by: INTERNAL MEDICINE

## 2020-03-02 PROCEDURE — 3046F HEMOGLOBIN A1C LEVEL >9.0%: CPT | Performed by: INTERNAL MEDICINE

## 2020-03-02 PROCEDURE — G8482 FLU IMMUNIZE ORDER/ADMIN: HCPCS | Performed by: INTERNAL MEDICINE

## 2020-03-02 PROCEDURE — 1036F TOBACCO NON-USER: CPT | Performed by: INTERNAL MEDICINE

## 2020-03-02 RX ORDER — ALPRAZOLAM 0.5 MG/1
TABLET ORAL
Qty: 1 TABLET | Refills: 0 | Status: SHIPPED | OUTPATIENT
Start: 2020-03-02 | End: 2020-03-02

## 2020-03-02 RX ORDER — LANCETS 33 GAUGE
1 EACH MISCELLANEOUS 4 TIMES DAILY
Qty: 200 EACH | Refills: 6 | Status: SHIPPED
Start: 2020-03-02 | End: 2020-05-27 | Stop reason: SDUPTHER

## 2020-03-02 RX ORDER — GLUCOSAMINE/CHONDR SU A SOD 167-133 MG
100 CAPSULE ORAL DAILY
Qty: 30 TABLET | Refills: 5 | Status: SHIPPED
Start: 2020-03-02 | End: 2021-01-07 | Stop reason: ALTCHOICE

## 2020-03-02 RX ORDER — ASCORBIC ACID 500 MG
500 TABLET ORAL DAILY
Qty: 30 TABLET | Refills: 5 | Status: SHIPPED | OUTPATIENT
Start: 2020-03-02

## 2020-03-02 RX ORDER — MESALAMINE 1.2 G/1
1.2 TABLET, DELAYED RELEASE ORAL 3 TIMES DAILY
Qty: 90 TABLET | Refills: 5 | Status: SHIPPED
Start: 2020-03-02 | End: 2020-09-28 | Stop reason: SDUPTHER

## 2020-03-02 RX ORDER — MULTIVITAMIN WITH FOLIC ACID 400 MCG
1 TABLET ORAL DAILY
Qty: 30 TABLET | Refills: 5 | Status: SHIPPED | OUTPATIENT
Start: 2020-03-02

## 2020-03-02 RX ORDER — PANTOPRAZOLE SODIUM 40 MG/1
40 TABLET, DELAYED RELEASE ORAL DAILY
Qty: 30 TABLET | Refills: 5 | Status: SHIPPED
Start: 2020-03-02 | End: 2020-08-05 | Stop reason: SDUPTHER

## 2020-03-02 ASSESSMENT — PATIENT HEALTH QUESTIONNAIRE - PHQ9
SUM OF ALL RESPONSES TO PHQ QUESTIONS 1-9: 0
2. FEELING DOWN, DEPRESSED OR HOPELESS: 0
1. LITTLE INTEREST OR PLEASURE IN DOING THINGS: 0
SUM OF ALL RESPONSES TO PHQ QUESTIONS 1-9: 0
SUM OF ALL RESPONSES TO PHQ9 QUESTIONS 1 & 2: 0

## 2020-03-02 NOTE — PROGRESS NOTES
[address] [facility] [address]     3/2/20  Mago Reach : 1955 Sex: male  Age: 59 y.o. Chief Complaint   Patient presents with    Diabetes       HPI    Patient presents today for follow-up visit on multiple medical problems. I did review last note which was rather extensive. I did start him on Zoloft for his anxiety and claustrophobia. States he is doing much better at this point on the medication and likes it. Tells me blood pressures at home have been good although little bit marginally high here today. Blood sugars at home have been good. He does follow with a pharmacist who is managing his sugars at this time. Last hemoglobin A1c was 6.3 he is going to get another one upcoming in the next couple weeks. He never did follow-up with dermatology regarding atypical-looking nevi on his back I told him I was going to reset him up again I did encourage him to have this done. We also looked over his last blood work from December timeframe which did look good. We discussed low-dose CT of the chest with his smoking history and he is willing to have this done. He will need Xanax to get him through with the claustrophobia situation. This will be prescribed. Oarrs report was run and okay. Patient continues to vape. I told him this is not okay. This is not an ok replacement for cigarettes and is probably part of his shortness of breath issue. He needs to quit and he has been told so. He states he is weaning this down very slowly. He continues to follow-up with pharmacy, cardiology, ophthalmology, urology, GI.   As stated above has also recently seen pulmonary.       Review of Systems      Const: Denies chills, fever. Eyes: Denies eye symptoms States he is up to date with his eyes  ENMT: Denies ear symptoms. Reports postnasal drip, but denies other nasal symptoms. Denies  mouth or throat symptoms. CV: Denies chest pain, orthopnea and palpitations.   Positive for edema lower extremities right greater Problem Relation Age of Onset    Asthma Mother     Diabetes Father     Heart Disease Father     Coronary Art Dis Father      Social History     Socioeconomic History    Marital status:      Spouse name: Not on file    Number of children: Not on file    Years of education: Not on file    Highest education level: Not on file   Occupational History    Not on file   Social Needs    Financial resource strain: Not on file    Food insecurity:     Worry: Not on file     Inability: Not on file    Transportation needs:     Medical: Not on file     Non-medical: Not on file   Tobacco Use    Smoking status: Former Smoker     Packs/day: 1.50     Years: 35.00     Pack years: 52.50     Types: E-Cigarettes, Cigarettes     Last attempt to quit: 2015     Years since quittin.1    Smokeless tobacco: Never Used    Tobacco comment: currently uses vapor cigarettes   Substance and Sexual Activity    Alcohol use:  Yes     Alcohol/week: 10.0 standard drinks     Types: 10 Cans of beer per week     Comment: occasionally    Drug use: No    Sexual activity: Not on file   Lifestyle    Physical activity:     Days per week: Not on file     Minutes per session: Not on file    Stress: Not on file   Relationships    Social connections:     Talks on phone: Not on file     Gets together: Not on file     Attends Christianity service: Not on file     Active member of club or organization: Not on file     Attends meetings of clubs or organizations: Not on file     Relationship status: Not on file    Intimate partner violence:     Fear of current or ex partner: Not on file     Emotionally abused: Not on file     Physically abused: Not on file     Forced sexual activity: Not on file   Other Topics Concern    Not on file   Social History Narrative    Not on file       Vitals:    20 0913   BP: (!) 140/66   Pulse: 79   SpO2: 95%   Weight: 242 lb 6.4 oz (110 kg)   Height: 6' 2\" (1.88 m)       Physical (PROTONIX) 40 MG tablet; Take 1 tablet by mouth daily    Diabetes mellitus type 2, insulin dependent (HCC)  -     OneTouch Delica Lancets 18H MISC; 1 Device by Other route 4 times daily    Sleep apnea, obstructive    Coronary artery disease involving coronary bypass graft of native heart without angina pectoris    Atypical nevi  -     External Referral To Dermatology    Anxiety  -     ALPRAZolam (XANAX) 0.5 MG tablet; 1 tablet 1 hr before procedure      Plan: I will see him back in 3 months and as needed. No blood work today as I reviewed everything from less than 3 months ago. We will check everything fasting next time around. Prescription management performed. Set him up for low-dose CT of the chest.  Continue to monitor blood pressure and blood sugar closely. Colonoscopy coming up in May timeframe. Will need again to attempt to get last cardiology note and 2D echo report which I still do not have. Notify us with problems in the interim. Return in about 3 months (around 6/2/2020), or fasting. Seen By:  Kandace Mcmahan MD      *Document was created using voice recognition software. Note was reviewed however may contain grammatical errors.

## 2020-05-26 RX ORDER — ERGOCALCIFEROL 1.25 MG/1
50000 CAPSULE ORAL
Qty: 6 CAPSULE | Refills: 0 | Status: SHIPPED
Start: 2020-05-26 | End: 2020-05-27 | Stop reason: SDUPTHER

## 2020-06-08 RX ORDER — AMLODIPINE BESYLATE 10 MG/1
10 TABLET ORAL DAILY
Qty: 30 TABLET | Refills: 1 | Status: SHIPPED
Start: 2020-06-08 | End: 2020-06-26 | Stop reason: SDUPTHER

## 2020-06-26 ENCOUNTER — TELEPHONE (OUTPATIENT)
Dept: FAMILY MEDICINE CLINIC | Age: 65
End: 2020-06-26

## 2020-06-26 ENCOUNTER — OFFICE VISIT (OUTPATIENT)
Dept: FAMILY MEDICINE CLINIC | Age: 65
End: 2020-06-26
Payer: MEDICARE

## 2020-06-26 ENCOUNTER — HOSPITAL ENCOUNTER (OUTPATIENT)
Age: 65
Discharge: HOME | End: 2020-06-28
Payer: MEDICARE

## 2020-06-26 VITALS
HEART RATE: 69 BPM | HEIGHT: 74 IN | BODY MASS INDEX: 30.62 KG/M2 | DIASTOLIC BLOOD PRESSURE: 78 MMHG | OXYGEN SATURATION: 98 % | SYSTOLIC BLOOD PRESSURE: 134 MMHG | TEMPERATURE: 97.6 F | WEIGHT: 238.6 LBS

## 2020-06-26 LAB
ALBUMIN SERPL-MCNC: 4.4 G/DL (ref 3.5–5.2)
ALP BLD-CCNC: 62 U/L (ref 40–129)
ALT SERPL-CCNC: 57 U/L (ref 0–40)
ANION GAP SERPL CALCULATED.3IONS-SCNC: 15 MMOL/L (ref 7–16)
AST SERPL-CCNC: 39 U/L (ref 0–39)
BASOPHILS ABSOLUTE: 0.08 E9/L (ref 0–0.2)
BASOPHILS RELATIVE PERCENT: 0.7 % (ref 0–2)
BILIRUB SERPL-MCNC: 0.3 MG/DL (ref 0–1.2)
BUN BLDV-MCNC: 16 MG/DL (ref 8–23)
CALCIUM SERPL-MCNC: 9.4 MG/DL (ref 8.6–10.2)
CHLORIDE BLD-SCNC: 106 MMOL/L (ref 98–107)
CHOLESTEROL, TOTAL: 111 MG/DL (ref 0–199)
CO2: 20 MMOL/L (ref 22–29)
CREAT SERPL-MCNC: 1 MG/DL (ref 0.7–1.2)
CREATININE URINE: 145 MG/DL (ref 40–278)
EOSINOPHILS ABSOLUTE: 0.74 E9/L (ref 0.05–0.5)
EOSINOPHILS RELATIVE PERCENT: 6.5 % (ref 0–6)
GFR AFRICAN AMERICAN: >60
GFR NON-AFRICAN AMERICAN: >60 ML/MIN/1.73
GLUCOSE BLD-MCNC: 96 MG/DL (ref 74–99)
HCT VFR BLD CALC: 52.9 % (ref 37–54)
HDLC SERPL-MCNC: 44 MG/DL
HEMOGLOBIN: 16.5 G/DL (ref 12.5–16.5)
IMMATURE GRANULOCYTES #: 0.04 E9/L
IMMATURE GRANULOCYTES %: 0.3 % (ref 0–5)
LDL CHOLESTEROL CALCULATED: 46 MG/DL (ref 0–99)
LYMPHOCYTES ABSOLUTE: 2.03 E9/L (ref 1.5–4)
LYMPHOCYTES RELATIVE PERCENT: 17.7 % (ref 20–42)
MCH RBC QN AUTO: 30.6 PG (ref 26–35)
MCHC RBC AUTO-ENTMCNC: 31.2 % (ref 32–34.5)
MCV RBC AUTO: 98.1 FL (ref 80–99.9)
MICROALBUMIN UR-MCNC: 12.3 MG/L
MICROALBUMIN/CREAT UR-RTO: 8.5 (ref 0–30)
MONOCYTES ABSOLUTE: 1.06 E9/L (ref 0.1–0.95)
MONOCYTES RELATIVE PERCENT: 9.2 % (ref 2–12)
NEUTROPHILS ABSOLUTE: 7.51 E9/L (ref 1.8–7.3)
NEUTROPHILS RELATIVE PERCENT: 65.6 % (ref 43–80)
PDW BLD-RTO: 14.4 FL (ref 11.5–15)
PLATELET # BLD: 409 E9/L (ref 130–450)
PMV BLD AUTO: 11.1 FL (ref 7–12)
POTASSIUM SERPL-SCNC: 4.3 MMOL/L (ref 3.5–5)
RBC # BLD: 5.39 E12/L (ref 3.8–5.8)
SODIUM BLD-SCNC: 141 MMOL/L (ref 132–146)
TOTAL PROTEIN: 7.3 G/DL (ref 6.4–8.3)
TRIGL SERPL-MCNC: 104 MG/DL (ref 0–149)
VLDLC SERPL CALC-MCNC: 21 MG/DL
WBC # BLD: 11.5 E9/L (ref 4.5–11.5)

## 2020-06-26 PROCEDURE — 3046F HEMOGLOBIN A1C LEVEL >9.0%: CPT | Performed by: INTERNAL MEDICINE

## 2020-06-26 PROCEDURE — 80053 COMPREHEN METABOLIC PANEL: CPT

## 2020-06-26 PROCEDURE — 4040F PNEUMOC VAC/ADMIN/RCVD: CPT | Performed by: INTERNAL MEDICINE

## 2020-06-26 PROCEDURE — 36415 COLL VENOUS BLD VENIPUNCTURE: CPT

## 2020-06-26 PROCEDURE — 3017F COLORECTAL CA SCREEN DOC REV: CPT | Performed by: INTERNAL MEDICINE

## 2020-06-26 PROCEDURE — 82570 ASSAY OF URINE CREATININE: CPT

## 2020-06-26 PROCEDURE — 99214 OFFICE O/P EST MOD 30 MIN: CPT | Performed by: INTERNAL MEDICINE

## 2020-06-26 PROCEDURE — 2022F DILAT RTA XM EVC RTNOPTHY: CPT | Performed by: INTERNAL MEDICINE

## 2020-06-26 PROCEDURE — 80061 LIPID PANEL: CPT

## 2020-06-26 PROCEDURE — 82044 UR ALBUMIN SEMIQUANTITATIVE: CPT

## 2020-06-26 PROCEDURE — 3288F FALL RISK ASSESSMENT DOCD: CPT | Performed by: INTERNAL MEDICINE

## 2020-06-26 PROCEDURE — G8427 DOCREV CUR MEDS BY ELIG CLIN: HCPCS | Performed by: INTERNAL MEDICINE

## 2020-06-26 PROCEDURE — G8510 SCR DEP NEG, NO PLAN REQD: HCPCS | Performed by: INTERNAL MEDICINE

## 2020-06-26 PROCEDURE — 1123F ACP DISCUSS/DSCN MKR DOCD: CPT | Performed by: INTERNAL MEDICINE

## 2020-06-26 PROCEDURE — G8417 CALC BMI ABV UP PARAM F/U: HCPCS | Performed by: INTERNAL MEDICINE

## 2020-06-26 PROCEDURE — 85025 COMPLETE CBC W/AUTO DIFF WBC: CPT

## 2020-06-26 PROCEDURE — 1036F TOBACCO NON-USER: CPT | Performed by: INTERNAL MEDICINE

## 2020-06-26 RX ORDER — AMLODIPINE BESYLATE 10 MG/1
10 TABLET ORAL DAILY
Qty: 30 TABLET | Refills: 5 | Status: SHIPPED
Start: 2020-06-26 | End: 2021-01-07 | Stop reason: SDUPTHER

## 2020-06-26 RX ORDER — NAPROXEN 500 MG/1
500 TABLET ORAL 2 TIMES DAILY PRN
Qty: 60 TABLET | Refills: 5 | Status: SHIPPED
Start: 2020-06-26 | End: 2020-12-08

## 2020-06-26 ASSESSMENT — PATIENT HEALTH QUESTIONNAIRE - PHQ9
SUM OF ALL RESPONSES TO PHQ QUESTIONS 1-9: 0
SUM OF ALL RESPONSES TO PHQ QUESTIONS 1-9: 0
1. LITTLE INTEREST OR PLEASURE IN DOING THINGS: 0
2. FEELING DOWN, DEPRESSED OR HOPELESS: 0
SUM OF ALL RESPONSES TO PHQ9 QUESTIONS 1 & 2: 0

## 2020-06-26 NOTE — PROGRESS NOTES
[address] [facility] [address]     20  Dayami Cortes : 1955 Sex: male  Age: 72 y.o. Chief Complaint   Patient presents with    Hypertension    Diabetes       HPI    Patient presents today for 3-month follow-up visit on his multiple medical problems. I looked over last note. Since I have seen him he has seen cardiology and GI and has had colonoscopy. Overall these reports. States that blood pressures have been in a good range although somewhat marginal here today blood sugars have been good with his last hemoglobin A1c of 6.1. He did have several questions about his medications and supplements that I reviewed with him. He continues to vape although he states he is trying to wean down. I did encourage him to stop altogether. CAD status is stable. Patient does have obstructive sleep apnea and because of his claustrophobia will not wear CPAP. He does complain of some daytime fatigue does take some naps during the day. Patient never did see dermatology for his atypical nevi that I sent him for nor has he had low-dose CAT scan done yet plans to do both sometime in the near future and I stressed compliance. He continues to follow-up with pharmacy, cardiology, ophthalmology, urology, GI.   As stated above has also recently seen pulmonary. Review of Systems      Const: Denies chills, fever. Eyes: Denies eye symptoms States he is up to date with his eyes  ENMT: Denies ear symptoms. Reports postnasal drip, but denies other nasal symptoms. Denies  mouth or throat symptoms. CV: Denies chest pain, orthopnea and palpitations.  Positive for edema lower extremities right greater than left-some improved  Resp: Denies cough,  and wheezing.  Positive for shortness of breath-occasional with overexertion. Has seen cardiology. GI: Denies diarrhea, nausea and vomiting. : Urinary: denies dysuria, frequency and frequent UTI's. Musculo: Reports arthritis. Skin: Denies eczema, pruritus and sores.   Neuro:

## 2020-07-23 RX ORDER — CYCLOBENZAPRINE HCL 5 MG
TABLET ORAL
Qty: 90 TABLET | Refills: 1 | OUTPATIENT
Start: 2020-07-23

## 2020-07-23 NOTE — TELEPHONE ENCOUNTER
Last Appointment:  6/26/2020  Future Appointments   Date Time Provider Yogi Madrigal   9/28/2020  7:00 AM Boris Gutierrez  [address]

## 2020-08-05 RX ORDER — CYCLOBENZAPRINE HCL 5 MG
5 TABLET ORAL EVERY 8 HOURS PRN
Qty: 90 TABLET | Refills: 1 | Status: SHIPPED
Start: 2020-08-05 | End: 2021-01-07 | Stop reason: SDUPTHER

## 2020-08-05 RX ORDER — PANTOPRAZOLE SODIUM 40 MG/1
40 TABLET, DELAYED RELEASE ORAL DAILY
Qty: 30 TABLET | Refills: 5 | Status: SHIPPED
Start: 2020-08-05 | End: 2020-08-31 | Stop reason: SDUPTHER

## 2020-08-05 NOTE — TELEPHONE ENCOUNTER
Prescriptions sent. Tell this karen needs to bring his bottles in every visit and get his prescriptions at the visit. He is constantly calling in in between visits and is not appropriate. It needs to be done while he is here.

## 2020-08-05 NOTE — TELEPHONE ENCOUNTER
Last Appointment:  7/23/2020  Future Appointments   Date Time Provider Yogi Madrigal   9/28/2020  7:00 AM Faith Corral  [address] [facility] [address]

## 2020-08-31 RX ORDER — PANTOPRAZOLE SODIUM 40 MG/1
40 TABLET, DELAYED RELEASE ORAL DAILY
Qty: 90 TABLET | Refills: 1 | Status: SHIPPED
Start: 2020-08-31 | End: 2021-01-07 | Stop reason: SDUPTHER

## 2020-08-31 NOTE — TELEPHONE ENCOUNTER
Last Appointment:  6/26/2020  Future Appointments   Date Time Provider Yogi Madrigal   9/28/2020  7:00 AM Galileo Barclay  [address]

## 2020-09-22 RX ORDER — ATORVASTATIN CALCIUM 40 MG/1
40 TABLET, FILM COATED ORAL DAILY
Qty: 90 TABLET | Refills: 0 | Status: SHIPPED
Start: 2020-09-22 | End: 2020-09-28 | Stop reason: SDUPTHER

## 2020-09-23 RX ORDER — FENOFIBRATE 145 MG/1
145 TABLET, COATED ORAL DAILY
Qty: 30 TABLET | Refills: 5 | OUTPATIENT
Start: 2020-09-23

## 2020-09-23 NOTE — TELEPHONE ENCOUNTER
Last Appointment:  6/26/2020  Future Appointments   Date Time Provider Yogi Madrigal   9/28/2020  7:00 AM Belia Lebron  [address]

## 2020-09-28 ENCOUNTER — OFFICE VISIT (OUTPATIENT)
Dept: FAMILY MEDICINE CLINIC | Age: 65
End: 2020-09-28
Payer: MEDICARE

## 2020-09-28 VITALS
DIASTOLIC BLOOD PRESSURE: 62 MMHG | HEART RATE: 76 BPM | OXYGEN SATURATION: 95 % | WEIGHT: 236.6 LBS | BODY MASS INDEX: 31.36 KG/M2 | SYSTOLIC BLOOD PRESSURE: 128 MMHG | HEIGHT: 73 IN | TEMPERATURE: 97.6 F

## 2020-09-28 PROCEDURE — 1036F TOBACCO NON-USER: CPT | Performed by: INTERNAL MEDICINE

## 2020-09-28 PROCEDURE — G8417 CALC BMI ABV UP PARAM F/U: HCPCS | Performed by: INTERNAL MEDICINE

## 2020-09-28 PROCEDURE — 2022F DILAT RTA XM EVC RTNOPTHY: CPT | Performed by: INTERNAL MEDICINE

## 2020-09-28 PROCEDURE — 3046F HEMOGLOBIN A1C LEVEL >9.0%: CPT | Performed by: INTERNAL MEDICINE

## 2020-09-28 PROCEDURE — 3017F COLORECTAL CA SCREEN DOC REV: CPT | Performed by: INTERNAL MEDICINE

## 2020-09-28 PROCEDURE — 1123F ACP DISCUSS/DSCN MKR DOCD: CPT | Performed by: INTERNAL MEDICINE

## 2020-09-28 PROCEDURE — 4040F PNEUMOC VAC/ADMIN/RCVD: CPT | Performed by: INTERNAL MEDICINE

## 2020-09-28 PROCEDURE — G8427 DOCREV CUR MEDS BY ELIG CLIN: HCPCS | Performed by: INTERNAL MEDICINE

## 2020-09-28 PROCEDURE — 99214 OFFICE O/P EST MOD 30 MIN: CPT | Performed by: INTERNAL MEDICINE

## 2020-09-28 RX ORDER — PREDNISONE 2.5 MG
2.5 TABLET ORAL DAILY
Qty: 90 TABLET | Refills: 1 | Status: SHIPPED
Start: 2020-09-28 | End: 2021-01-07 | Stop reason: SDUPTHER

## 2020-09-28 RX ORDER — ATORVASTATIN CALCIUM 40 MG/1
40 TABLET, FILM COATED ORAL DAILY
Qty: 90 TABLET | Refills: 1 | Status: SHIPPED
Start: 2020-09-28 | End: 2021-01-07 | Stop reason: SDUPTHER

## 2020-09-28 RX ORDER — FENOFIBRATE 145 MG/1
145 TABLET, COATED ORAL DAILY
Qty: 90 TABLET | Refills: 1 | Status: SHIPPED
Start: 2020-09-28 | End: 2021-01-07 | Stop reason: SDUPTHER

## 2020-09-28 RX ORDER — MESALAMINE 1.2 G/1
1.2 TABLET, DELAYED RELEASE ORAL 3 TIMES DAILY
Qty: 270 TABLET | Refills: 1 | Status: SHIPPED
Start: 2020-09-28 | End: 2021-01-07 | Stop reason: SDUPTHER

## 2020-09-28 RX ORDER — INSULIN GLARGINE 100 [IU]/ML
INJECTION, SOLUTION SUBCUTANEOUS EVERY MORNING
COMMUNITY

## 2020-09-28 ASSESSMENT — PATIENT HEALTH QUESTIONNAIRE - PHQ9
SUM OF ALL RESPONSES TO PHQ9 QUESTIONS 1 & 2: 0
SUM OF ALL RESPONSES TO PHQ QUESTIONS 1-9: 0
2. FEELING DOWN, DEPRESSED OR HOPELESS: 0
SUM OF ALL RESPONSES TO PHQ QUESTIONS 1-9: 0
1. LITTLE INTEREST OR PLEASURE IN DOING THINGS: 0

## 2020-09-28 NOTE — PROGRESS NOTES
[address] [facility]     20  Jasper Medrano : 1955 Sex: male  Age: 72 y.o. Chief Complaint   Patient presents with    Hyperlipidemia    Hypertension       HPI    Patient presents today for 3-month follow-up visit on his multiple medical problems. I reviewed last note. Does not wear CPAP secondary to claustrophobia. He does continue to vape but states he is weaning down. Blood pressure is been good. Blood sugars have been good and he does follow monthly with the pharmacist who is managing his diabetic medications currently. Last hemoglobin A1c was 6.1. States he does continue to cough and does have a lot of drainage. He is not up-to-date with his low-dose CT of the chest which I encouraged him to have done. Also has not seen pulmonary for over a year now. His lipid status is stable on statin medication. Anxiety/depression has been good on SSRI. Patient never did see dermatology for his atypical nevi that I sent him for nor has he had low-dose CAT scan done yet plans to do both sometime in the near future and I stressed compliance. He continues to follow-up with pharmacy, cardiology, ophthalmology, urology, GI.     Review of Systems     Const: Denies chills, fever. Eyes: Denies eye symptoms States he is up to date with his eyes  ENMT: Denies ear symptoms. Reports postnasal drip, but denies other nasal symptoms. Denies  mouth or throat symptoms. CV: Denies chest pain, orthopnea and palpitations.  Positive for edema lower extremities right greater than left-some improved. This is side that he had vein harvesting on. Resp: Denieswheezing.  Positive for shortness of breath-occasional with overexertion. Also admits to occasional cough-see above has seen cardiology. GI: Denies diarrhea, nausea and vomiting. : Urinary: denies dysuria, frequency and frequent UTI's. Musculo: Reports arthritis. Skin: Denies eczema, pruritus and sores.   Neuro: Denies dizziness, headache, seizures and syncope. Psych: Anxious-improved on Zoloft         REST OF PERTINENT ROS GONE OVER AND WAS NEGATIVE. PMH:  Problem List: Chronic ulcerative enterocolitis, Hyperlipidemia, Type 2 diabetes mellitus, Benign essential  hypertension  Health Maintenance:  Bone Density Scan - (10/16/2014)  Colonoscopy Screening - (10/20/2014)  Colonoscopy - (2/21/2008)  Colonoscopy - (5/28/2010)  Colonoscopy - (6/29/2012)  Colonoscopy - (10/20/2014)  Influenza Vaccination - (9/11/2015)  Colonoscopy - 2/08,5/10,12/11,6/12,10/14-Dr swartz, 6/20-due 21  Rectal Exam - 5/09,9/10,12/11,7/13-declined  PSA Test - 5/09,9/10,4/12,7/13  Prostate Exam - 5/09,9/10,12/11,7/13-declined  EKG - 5/09,11/13  EGD - Dr. Luana Trevino  Medical Problems:  Ulcerative Colitis - follows with stewartlomaria de jesus  Hyperlipidemia  chronic right shoulder pain - ac arthritis and rotator cuff tear  olecranon septic bursitis, Hypertension, Type 2 Diabetes  Headache - cluster  microdiscectomy L4L5  Sleep Apnea - uvulectomy- can't wear mask-declined further eval  Osteopenia  microscopic hematuria - Was evaluated by urology. Diverticulosis, Proteinuria, Colon Polyps, poor compliance  CAD - CABG 2017  Barrettes Esophagitis, Fatty Liver, Kidney Stones  Follows with - Cardiology ,pharmacist, ophthalmology, GI, urology  FH:  Father:  Coronary Artery Disease (CAD), Non Insulin Dependent Diabetes. Mother:  CLL, Hypertension, Chronic Obstructive Pulmonary Disease (COPD). Sister 1:  osteopenia. Paternal Uncle 1:  Lung Cancer. SH:  . (Marital)  Personal Habits: Cigarette Use: vapes. Alcohol: Current Alcohol Use              Current Outpatient Medications:     insulin glargine (LANTUS) 100 UNIT/ML injection vial, Inject into the skin nightly 75 units, Disp: , Rfl:     fenofibrate (TRICOR) 145 MG tablet, Take 1 tablet by mouth daily, Disp: 90 tablet, Rfl: 1    mesalamine (LIALDA) 1.2 g EC tablet, Take 1 tablet by mouth 3 times daily, Disp: 270 tablet, Rfl: 1    predniSONE (DELTASONE) 2.5 MG tablet, Take 1 tablet by mouth daily, Disp: 90 tablet, Rfl: 1    atorvastatin (LIPITOR) 40 MG tablet, Take 1 tablet by mouth daily, Disp: 90 tablet, Rfl: 1    metoprolol tartrate (LOPRESSOR) 25 MG tablet, Take 1 tablet by mouth 2 times daily, Disp: 180 tablet, Rfl: 0    sertraline (ZOLOFT) 50 MG tablet, TAKE 1 TABLET DAILY, Disp: 90 tablet, Rfl: 1    pantoprazole (PROTONIX) 40 MG tablet, Take 1 tablet by mouth daily, Disp: 90 tablet, Rfl: 1    metFORMIN (GLUCOPHAGE) 500 MG tablet, Take 2 tablets by mouth 2 times daily (with meals), Disp: 120 tablet, Rfl: 5    cyclobenzaprine (FLEXERIL) 5 MG tablet, Take 1 tablet by mouth every 8 hours as needed for Muscle spasms (in the low back), Disp: 90 tablet, Rfl: 1    naproxen (NAPROSYN) 500 MG tablet, Take 1 tablet by mouth 2 times daily as needed for Pain, Disp: 60 tablet, Rfl: 5    amLODIPine (NORVASC) 10 MG tablet, Take 1 tablet by mouth daily, Disp: 30 tablet, Rfl: 5    OneTouch Delica Lancets 03C MISC, 1 Device by Other route 4 times daily, Disp: 200 each, Rfl: 6    vitamin D (ERGOCALCIFEROL) 1.25 MG (66059 UT) CAPS capsule, Take 1 capsule by mouth every 30 days, Disp: 6 capsule, Rfl: 0    vitamin C (ASCORBIC ACID) 500 MG tablet, Take 1 tablet by mouth daily, Disp: 30 tablet, Rfl: 5    RA VITAMIN B-6 100 MG tablet, Take 1 tablet by mouth daily, Disp: 30 tablet, Rfl: 5    Multiple Vitamin (MULTIVITAMIN) tablet, Take 1 tablet by mouth daily Dispense tab-a-cathy if available, Disp: 30 tablet, Rfl: 5    blood glucose test strips (ONE TOUCH ULTRA TEST) strip, TEST four times a day, Disp: 400 strip, Rfl: 5    Elastic Bandages & Supports (MEDICAL COMPRESSION STOCKINGS) MISC, 1 each by Does not apply route daily as needed (edema) JOBST STOCKINGS 20-30MG MERCURY, Disp: 1 each, Rfl: 0    ONETOUCH DELICA LANCETS FINE MISC, 1 each by Other route 4 times daily, Disp: 100 each, Rfl: 3    empagliflozin (JARDIANCE) 25 MG tablet, Take 25 mg by mouth daily, Disp: , Rfl:     Omega-3 Fatty Acids (FISH OIL) 1000 MG CAPS, Take 3 capsules by mouth daily, Disp: 90 capsule, Rfl: 6    azaTHIOprine (IMURAN) 50 MG tablet, take 1 tablet by mouth three times a day, Disp: 90 tablet, Rfl: 3    Insulin Pen Needle 31G X 8 MM MISC, For injections of insulin 4 times a day, Disp: 120 each, Rfl: 5    Dulaglutide (TRULICITY) 1.5 MCKEE/5.3HB SOPN, Inject into the skin, Disp: , Rfl:     aspirin 81 MG tablet, Take 81 mg by mouth daily, Disp: , Rfl:     insulin glargine (TOUJEO SOLOSTAR) 300 UNIT/ML injection pen, Inject 75 Units into the skin daily , Disp: , Rfl:   Allergies   Allergen Reactions    Lisinopril Swelling     Tongue swells     Ace Inhibitors     Amino Acids        Past Medical History:   Diagnosis Date    Arthritis     Colitis     Colon polyps     Coronary artery disease of native artery of native heart with stable angina pectoris (HCC)     Diabetes mellitus ([facility] [address]     Diverticulosis     Headache     Hyperlipidemia     Hypertension     Osteopenia     Sleep apnea      Past Surgical History:   Procedure Laterality Date    APPENDECTOMY      BACK SURGERY      CARDIAC CATHETERIZATION  03/29/2017    Dr. Sofi Alston    [facility]     [facility] [facility]  04/06/2017    x2    ENDOSCOPY, COLON, DIAGNOSTIC      FRACTURE SURGERY      wrist    FRACTURE SURGERY      left tibia    FRACTURE SURGERY      fingers    FRACTURE SURGERY      collar bone    UVULECTOMY       Family History   Problem Relation Age of Onset    Asthma Mother     Diabetes Father     Heart Disease Father     Coronary Art Dis Father      Social History     Socioeconomic History    Marital status:      Spouse name: Not on file    Number of children: Not on file    Years of education: Not on file    Highest education level: Not on file   Occupational History    Not on file   Social Needs    Financial resource strain: Not on file    Food insecurity     Worry: Not on file     Inability: Not on file    Transportation needs     Medical: Not on file     Non-medical: Not on file   Tobacco Use    Smoking status: Former Smoker     Packs/day: 1.50     Years: 35.00     Pack years: 52.50     Types: E-Cigarettes, Cigarettes     Last attempt to quit: 2015     Years since quittin.7    Smokeless tobacco: Never Used    Tobacco comment: currently uses vapor cigarettes   Substance and Sexual Activity    Alcohol use: Yes     Alcohol/week: 10.0 standard drinks     Types: 10 Cans of beer per week     Comment: occasionally    Drug use: No    Sexual activity: Not on file   Lifestyle    Physical activity     Days per week: Not on file     Minutes per session: Not on file    Stress: Not on file   Relationships    Social connections     Talks on phone: Not on file     Gets together: Not on file     Attends Sikh service: Not on file     Active member of club or organization: Not on file     Attends meetings of clubs or organizations: Not on file     Relationship status: Not on file    Intimate partner violence     Fear of current or ex partner: Not on file     Emotionally abused: Not on file     Physically abused: Not on file     Forced sexual activity: Not on file   Other Topics Concern    Not on file   Social History Narrative    Not on file       Vitals:    20 0701   BP: 128/62   Pulse: 76   Temp: 97.6 °F (36.4 °C)   TempSrc: Temporal   SpO2: 95%   Weight: 236 lb 9.6 oz (107.3 kg)   Height: 6' 1\" (1.854 m)       Physical Exam    Constitutional: He is oriented to person, place, and time. He appears well-developed and well-nourished. No distress.   Overweight   Neck: Normal range of motion. Neck supple. No JVD present. No thyromegaly present. Cardiovascular: Normal rate, regular rhythm, normal heart sounds and intact distal pulses. Exam reveals no gallop and no friction rub.   No murmur heard.   Mid sternotomy scar from his CABG   Pulmonary/Chest: Effort normal and breath sounds normal. No respiratory distress. He has no wheezes. He has no rales. Abdominal: Soft. Bowel sounds are normal. He exhibits no distension and no mass. There is no tenderness. Musculoskeletal: Normal range of motion. Trace edema to feet and ankles bilaterally right greater than left. Pulses are palpable. He does have a couple open sores which look to be healing and scabbing over. I did ask him to watch this closely. Lymphadenopathy:     He has no cervical adenopathy. Neurological: He is alert and oriented to person, place, and time. No sensory deficit. He exhibits normal muscle tone. Coordination normal.   Skin: Skin is warm and dry.  Does have several nevi on his back.  I did ask him to see dermatology at least once for an evaluation to make sure none of these need biopsy. patient never did follow through with this. Again stressed compliance. psychiatric: He has a normal mood and affect. His behavior is normal.   Nursing note and vitals reviewed.           Assessment and Plan:  Jillian Lynch was seen today for hyperlipidemia and hypertension. Diagnoses and all orders for this visit:    Essential hypertension  -     Comprehensive Metabolic Panel; Future  Stable on antihypertensive medication    Mixed hyperlipidemia  -     fenofibrate (TRICOR) 145 MG tablet; Take 1 tablet by mouth daily  -     atorvastatin (LIPITOR) 40 MG tablet; Take 1 tablet by mouth daily  Stable on statin medication    Ulcerative colitis without complications, unspecified location (HCC)  -     mesalamine (LIALDA) 1.2 g EC tablet; Take 1 tablet by mouth 3 times daily  -     predniSONE (DELTASONE) 2.5 MG tablet; Take 1 tablet by mouth daily  Up-to-date with GI-recent colonoscopy. Diabetes mellitus type 2, insulin dependent (HCC)  Stable on diabetic medication. Coronary artery disease of native artery of native heart with stable angina pectoris (HCC)  Stable. Cough  To notify us if not improving-see above    Plan:  We will see him back in 3 months and as needed. [facility].  Prescription management performed. He needs to see dermatology. I encouraged him to have a CT of the chest low-dose with his smoking history. If cough persist may have pulmonary see him again. Will be set up for annual wellness visit. Notify us with problems in the interim. Return in about 3 months (around 12/28/2020). Seen By:  Boris Gutierrez MD      *Document was created using voice recognition software. Note was reviewed however may contain grammatical errors.

## 2020-09-29 ENCOUNTER — TELEPHONE (OUTPATIENT)
Dept: FAMILY MEDICINE CLINIC | Age: 65
End: 2020-09-29

## 2020-09-29 LAB
A/G RATIO: 1.2 RATIO (ref 1.1–2.2)
ALBUMIN SERPL-MCNC: 3.9 G/DL (ref 3.4–4.8)
ALP BLD-CCNC: 66 U/L (ref 42–121)
ALT SERPL-CCNC: 33 U/L (ref 10–63)
ANION GAP SERPL CALCULATED.3IONS-SCNC: 12 MEQ/L (ref 3–11)
AST SERPL-CCNC: 25 U/L (ref 10–41)
BILIRUB SERPL-MCNC: 0.8 MG/DL (ref 0.3–1.5)
BUN BLDV-MCNC: 18 MG/DL (ref 6–20)
CALCIUM SERPL-MCNC: 9.1 MG/DL (ref 8.5–10.5)
CHLORIDE BLD-SCNC: 105 MEQ/L (ref 98–107)
CO2: 24 MEQ/L (ref 21–31)
CREAT SERPL-MCNC: 0.9 MG/DL (ref 0.6–1.3)
CREATININE + EGFR PANEL: 102 ML/MIN
GFR NON-AFRICAN AMERICAN: 85 ML/MIN
GLOBULIN: 3.2 G/DL (ref 1.9–3.9)
GLUCOSE BLD-MCNC: 73 MG/DL (ref 70–99)
POTASSIUM SERPL-SCNC: 3.9 MEQ/L (ref 3.6–5)
SODIUM BLD-SCNC: 141 MEQ/L (ref 135–145)
TOTAL PROTEIN: 7.1 G/DL (ref 5.9–7.8)

## 2020-10-01 ENCOUNTER — VIRTUAL VISIT (OUTPATIENT)
Dept: FAMILY MEDICINE CLINIC | Age: 65
End: 2020-10-01
Payer: MEDICARE

## 2020-10-01 PROCEDURE — G0402 INITIAL PREVENTIVE EXAM: HCPCS | Performed by: PHYSICIAN ASSISTANT

## 2020-10-01 PROCEDURE — 1123F ACP DISCUSS/DSCN MKR DOCD: CPT | Performed by: PHYSICIAN ASSISTANT

## 2020-10-01 PROCEDURE — 3017F COLORECTAL CA SCREEN DOC REV: CPT | Performed by: PHYSICIAN ASSISTANT

## 2020-10-01 PROCEDURE — 4040F PNEUMOC VAC/ADMIN/RCVD: CPT | Performed by: PHYSICIAN ASSISTANT

## 2020-10-01 ASSESSMENT — LIFESTYLE VARIABLES
HOW OFTEN DO YOU HAVE A DRINK CONTAINING ALCOHOL: 3
HOW OFTEN DURING THE LAST YEAR HAVE YOU HAD A FEELING OF GUILT OR REMORSE AFTER DRINKING: NEVER
HOW OFTEN DO YOU HAVE SIX OR MORE DRINKS ON ONE OCCASION: NEVER
HOW OFTEN DURING THE LAST YEAR HAVE YOU HAD A FEELING OF GUILT OR REMORSE AFTER DRINKING: 0
HOW OFTEN DURING THE LAST YEAR HAVE YOU FOUND THAT YOU WERE NOT ABLE TO STOP DRINKING ONCE YOU HAD STARTED: 0
HOW OFTEN DO YOU HAVE SIX OR MORE DRINKS ON ONE OCCASION: 0
HOW OFTEN DURING THE LAST YEAR HAVE YOU BEEN UNABLE TO REMEMBER WHAT HAPPENED THE NIGHT BEFORE BECAUSE YOU HAD BEEN DRINKING: 0
HOW OFTEN DURING THE LAST YEAR HAVE YOU NEEDED AN ALCOHOLIC DRINK FIRST THING IN THE MORNING TO GET YOURSELF GOING AFTER A NIGHT OF HEAVY DRINKING: 0
HOW MANY STANDARD DRINKS CONTAINING ALCOHOL DO YOU HAVE ON A TYPICAL DAY: THREE OR FOUR
HAVE YOU OR SOMEONE ELSE BEEN INJURED AS A RESULT OF YOUR DRINKING: 0
HAVE YOU OR SOMEONE ELSE BEEN INJURED AS A RESULT OF YOUR DRINKING: NO
HOW OFTEN DO YOU HAVE A DRINK CONTAINING ALCOHOL: TWO TO THREE TIMES A WEEK
AUDIT TOTAL SCORE: 0
HOW OFTEN DURING THE LAST YEAR HAVE YOU FAILED TO DO WHAT WAS NORMALLY EXPECTED FROM YOU BECAUSE OF DRINKING: 0
HOW OFTEN DURING THE LAST YEAR HAVE YOU FOUND THAT YOU WERE NOT ABLE TO STOP DRINKING ONCE YOU HAD STARTED: NEVER
HOW OFTEN DURING THE LAST YEAR HAVE YOU NEEDED AN ALCOHOLIC DRINK FIRST THING IN THE MORNING TO GET YOURSELF GOING AFTER A NIGHT OF HEAVY DRINKING: NEVER
AUDIT TOTAL SCORE: 4
AUDIT-C TOTAL SCORE: 0
HOW OFTEN DURING THE LAST YEAR HAVE YOU FAILED TO DO WHAT WAS NORMALLY EXPECTED FROM YOU BECAUSE OF DRINKING: NEVER
AUDIT-C TOTAL SCORE: 4
HAS A RELATIVE, FRIEND, DOCTOR, OR ANOTHER HEALTH PROFESSIONAL EXPRESSED CONCERN ABOUT YOUR DRINKING OR SUGGESTED YOU CUT DOWN: NO
HAS A RELATIVE, FRIEND, DOCTOR, OR ANOTHER HEALTH PROFESSIONAL EXPRESSED CONCERN ABOUT YOUR DRINKING OR SUGGESTED YOU CUT DOWN: 0
HOW MANY STANDARD DRINKS CONTAINING ALCOHOL DO YOU HAVE ON A TYPICAL DAY: 1
HOW OFTEN DURING THE LAST YEAR HAVE YOU BEEN UNABLE TO REMEMBER WHAT HAPPENED THE NIGHT BEFORE BECAUSE YOU HAD BEEN DRINKING: NEVER

## 2020-10-01 ASSESSMENT — PATIENT HEALTH QUESTIONNAIRE - PHQ9
SUM OF ALL RESPONSES TO PHQ9 QUESTIONS 1 & 2: 0
1. LITTLE INTEREST OR PLEASURE IN DOING THINGS: 0
SUM OF ALL RESPONSES TO PHQ QUESTIONS 1-9: 0
2. FEELING DOWN, DEPRESSED OR HOPELESS: 0
SUM OF ALL RESPONSES TO PHQ QUESTIONS 1-9: 0

## 2020-10-01 NOTE — PROGRESS NOTES
Device by Other route 4 times daily Yes Marlene Blunt MD   vitamin D (ERGOCALCIFEROL) 1.25 MG (59677 UT) CAPS capsule Take 1 capsule by mouth every 30 days Yes Marlene Blunt MD   vitamin C (ASCORBIC ACID) 500 MG tablet Take 1 tablet by mouth daily Yes Marlene Blunt MD   Multiple Vitamin (MULTIVITAMIN) tablet Take 1 tablet by mouth daily Dispense tab-a-cathy if available Yes Marlene Blunt MD   blood glucose test strips (ONE TOUCH ULTRA TEST) strip TEST four times a day Yes Marlene Blunt MD   Elastic Bandages & Supports ([address]) [address] [facility] [address] 1 each by Does not apply route daily as needed (edema) JOBST STOCKINGS  20-30MG MERCURY Yes Marlene Blunt MD   [facility] DELICA LANCETS FINE MISC 1 each by Other route 4 times daily Yes Marlene Blunt MD   empagliflozin (JARDIANCE) 25 MG tablet Take 25 mg by mouth daily Yes Historical Provider, MD   Omega-3 Fatty Acids (FISH OIL) 1000 MG CAPS Take 3 capsules by mouth daily Yes Marlene Blunt MD   azaTHIOprine (IMURAN) 50 MG tablet take 1 tablet by mouth three times a day Yes Anahy Spine, DO   Insulin Pen Needle 31G X 8 MM MISC For injections of insulin 4 times a day Yes Anahy Spine, DO   Dulaglutide (TRULICITY) 1.5 EA/4.1JL SOPN Inject into the skin Yes Historical Provider, MD   aspirin 81 MG tablet Take 81 mg by mouth daily Yes Historical Provider, MD   insulin glargine (TOUJEO SOLOSTAR) 300 UNIT/ML injection pen Inject 75 Units into the skin daily  Yes Historical Provider, MD   RA VITAMIN B-6 100 MG tablet Take 1 tablet by mouth daily  Patient not taking: Reported on 10/1/2020  Marlene Blunt MD       Past Medical History:   Diagnosis Date    Arthritis     Colitis     Colon polyps     Coronary artery disease of native artery of native heart with stable angina pectoris [facility]     Diabetes mellitus ([facility]     Diverticulosis     Headache     Hyperlipidemia     Hypertension     Osteopenia     Sleep apnea        Past Surgical History:   Procedure Yes  There is no height or weight on file to calculate BMI.   Health Habits/Nutrition Interventions:  · Inadequate physical activity:  patient is not ready to increase his/her physical activity level at this time    Safety:  Safety  Do you have working smoke detectors?: Yes  Have all throw rugs been removed or fastened?: Yes  Do you have non-slip mats or surfaces in all bathtubs/showers?: (!) No  Do all of your stairways have a railing or banister?: Yes  Are your doorways, halls and stairs free of clutter?: Yes  Do you always fasten your seatbelt when you are in a car?: Yes  Safety Interventions:  · Home safety tips provided    Personalized Preventive Plan   Current Health Maintenance Status  Immunization History   Administered Date(s) Administered    Influenza Vaccine, unspecified formulation 11/17/2006, 11/19/2010, 12/02/2011, 11/19/2012, 11/08/2013, 10/09/2014, 09/11/2015    Influenza Virus Vaccine 11/16/2006, 11/18/2010, 12/01/2011, 11/18/2012, 11/08/2013, 10/09/2014, 09/11/2015, 08/21/2018    Influenza, Quadv, IM, PF (6 mo and older Fluzone, Flulaval, Fluarix, and 3 yrs and older Afluria) 09/20/2017, 08/21/2018, 09/12/2019    Pneumococcal Polysaccharide (Aqfbdfplr66) 11/19/2010, 04/24/2015, 09/22/2016    Tdap (Boostrix, Adacel) 09/06/2007, 09/20/2017    Zoster Recombinant (Shingrix) 11/04/2016, 08/28/2018, 12/27/2018        Health Maintenance   Topic Date Due    Low dose CT lung screening  06/19/2010    Diabetic foot exam  04/26/2019    Annual Wellness Visit (AWV)  06/26/2020    A1C test (Diabetic or Prediabetic)  08/02/2020    Flu vaccine (1) 10/01/2021 (Originally 9/1/2020)    Diabetic retinal exam  10/11/2021 (Originally 2/15/2019)    Diabetic microalbuminuria test  06/26/2021    Lipid screen  06/26/2021    Pneumococcal 65+ years Vaccine (1 of 1 - PPSV23) 09/22/2021    Statin Therapy  09/28/2021    Potassium monitoring  09/29/2021    Creatinine monitoring  09/29/2021    Colon cancer screen colonoscopy  06/01/2022    DTaP/Tdap/Td vaccine (3 - Td) 09/20/2027    Shingles Vaccine  Completed    AAA screen  Completed    Hepatitis C screen  Completed    HIV screen  Completed    Hepatitis A vaccine  Aged Out    Hib vaccine  Aged Out    Meningococcal (ACWY) vaccine  Aged Out     Recommendations for Dr. Jerry's Smooth Move Due: see orders and patient instructions/AVS.  . Recommended screening schedule for the next 5-10 years is provided to the patient in written form: see Patient Instructions/AVS.    Yobany Carson was seen today for medicare aw. Diagnoses and all orders for this visit:    Personal history of tobacco use    Routine general medical examination at a health care facility              Kirill Carrero is a 72 y.o. male being evaluated by a Virtual Visit (video and audio) encounter to address concerns as mentioned above. A caregiver was present when appropriate. Due to this being a TeleHealth encounter (During XDZFQ-10 public health emergency), evaluation of the following organ systems was limited: Vitals/Constitutional/EENT/Resp/CV/GI//MS/Neuro/Skin/Heme-Lymph-Imm. Pursuant to the emergency declaration under the [address] [place] [address], [address] [place], [place] [address]iver authority and the [employer] Act, this Virtual Visit was conducted with patient's (and/or legal guardian's) consent, to reduce the patient's risk of exposure to COVID-19 and provide necessary medical care. The patient (and/or legal guardian) has also been advised to contact this office for worsening conditions or problems, and seek emergency medical treatment and/or call 911 if deemed necessary. Patient identification was verified at the start of the visit: Yes    Services were provided through a video synchronous discussion virtually to substitute for in-person clinic visit. Patient and provider were located at their individual homes.     --WANDA Farmer III on 10/1/2020 at 8:20 AM    An electronic signature was used to authenticate this note. Advance Care Planning   Advanced Care Planning: Discussed the patients choices for care and treatment in case of a health event that adversely affects decision-making abilities. Also discussed the patients long-term treatment options. Reviewed with the patient the [facility] of [address] [place] [address] Declaration forms  Reviewed the process of designating a competent adult as an Agent (or -in-fact) that could take make health care decisions for the patient if incompetent. Patient was asked to complete the declaration forms, either acknowledge the forms by a public notary or an eligible witness and provide a signed copy to the practice office. Time spent (minutes): less than 5 minutes     Obesity Counseling: Assessed behavioral health risks and factors affecting choice of behavior. Suggested weight control approaches, including dietary changes behavioral modification and follow up plan. Provided educational and support documentation. Time spent (minutes): 5-10 minutes    Cardiovascular Disease Risk Counseling: Assessed the patient's risk to develop cardiovascular disease and reviewed main risk factors. Reviewed steps to reduce disease risk including:   · Quitting tobacco use, reducing amount smoked, or not starting the habit  · Making healthy food choices  · Being physically active and gradualy increasing activity levels   · Reduce weight and determine a healthy BMI goal  · Monitor blood pressure and treat if higher than 140/90 mmHg  · Maintain blood total cholesterol levels under 5 mmol/l or 190 mg/dl  · Maintain LDL cholesterol levels under 3.0 mmol/l or 115 mg/dl   · Control blood glucose levels  · Consider taking aspirin (75 mg daily), once blood pressure is controlled   Provided a follow up plan.   Time spent (minutes): less than 5 minutes

## 2020-10-01 NOTE — PATIENT INSTRUCTIONS
smoking. · Consider signing up for a smoking cessation program, such as the American Lung Association's Freedom from Smoking program.  · Get text messaging support. Go to the website at www.smokefree. gov to sign up for the [facility] program.  · Set a quit date. Pick your date carefully so that it is not right in the middle of a big deadline or stressful time. Once you quit, do not even take a puff. Get rid of all ashtrays and lighters after your last cigarette. Clean your house and your clothes so that they do not smell of smoke. · Learn how to be a nonsmoker. Think about ways you can avoid those things that make you reach for a cigarette. ? Avoid situations that put you at greatest risk for smoking. For some people, it is hard to have a drink with friends without smoking. For others, they might skip a coffee break with coworkers who smoke. ? Change your daily routine. Take a different route to work or eat a meal in a different place. · Cut down on stress. Calm yourself or release tension by doing an activity you enjoy, such as reading a book, taking a hot bath, or gardening. · Talk to your doctor or pharmacist about nicotine replacement therapy, which replaces the nicotine in your body. You still get nicotine but you do not use tobacco. Nicotine replacement products help you slowly reduce the amount of nicotine you need. These products come in several forms, many of them available over-the-counter:  ? Nicotine patches  ? Nicotine gum and lozenges  ? Nicotine inhaler  · Ask your doctor about bupropion (Wellbutrin) or varenicline (Chantix), which are prescription medicines. They do not contain nicotine. They help you by reducing withdrawal symptoms, such as stress and anxiety. · Some people find hypnosis, acupuncture, and massage helpful for ending the smoking habit. · Eat a healthy diet and get regular exercise. Having healthy habits will help your body move past its craving for nicotine.   · Be prepared to keep trying. Most people are not successful the first few times they try to quit. Do not get mad at yourself if you smoke again. Make a list of things you learned and think about when you want to try again, such as next week, next month, or next year. Where can you learn more? Go to https://chpepiceweb.[employer]Duche. org and sign in to your [employer] account. Enter Y195 in the [facility] box to learn more about \"Stopping Smoking: Care Instructions. \"     If you do not have an account, please click on the \"Sign Up Now\" link. Current as of: March 12, 2020               Content Version: 12.5  © 2006-2020 Healthwise, Incorporated. Care instructions adapted under license by [facility] ([facility] [place] [facility]). If you have questions about a medical condition or this instruction, always ask your healthcare professional. [place] [address] any warranty or liability for your use of this information. Personalized Preventive Plan for Gunnar Ochoa - 10/1/2020  Medicare offers a range of preventive health benefits. Some of the tests and screenings are paid in full while other may be subject to a deductible, co-insurance, and/or copay. Some of these benefits include a comprehensive review of your medical history including lifestyle, illnesses that may run in your family, and various assessments and screenings as appropriate. After reviewing your medical record and screening and assessments performed today your provider may have ordered immunizations, labs, imaging, and/or referrals for you. A list of these orders (if applicable) as well as your Preventive Care list are included within your After Visit Summary for your review. Other Preventive Recommendations:    · A preventive eye exam performed by an eye specialist is recommended every 1-2 years to screen for glaucoma; cataracts, macular degeneration, and other eye disorders. · A preventive dental visit is recommended every 6 months.   · Try to get

## 2020-11-19 RX ORDER — MESALAMINE 1.2 G/1
1.2 TABLET, DELAYED RELEASE ORAL 3 TIMES DAILY
Qty: 270 TABLET | Refills: 1 | Status: CANCELLED | OUTPATIENT
Start: 2020-11-19

## 2020-11-19 RX ORDER — ATORVASTATIN CALCIUM 40 MG/1
40 TABLET, FILM COATED ORAL DAILY
Qty: 90 TABLET | Refills: 1 | Status: CANCELLED | OUTPATIENT
Start: 2020-11-19

## 2020-11-19 RX ORDER — PREDNISONE 2.5 MG
2.5 TABLET ORAL DAILY
Qty: 90 TABLET | Refills: 1 | Status: CANCELLED | OUTPATIENT
Start: 2020-11-19

## 2020-11-19 RX ORDER — FENOFIBRATE 145 MG/1
145 TABLET, COATED ORAL DAILY
Qty: 90 TABLET | Refills: 1 | Status: CANCELLED | OUTPATIENT
Start: 2020-11-19

## 2020-12-08 RX ORDER — NAPROXEN 500 MG/1
TABLET ORAL
Qty: 60 TABLET | Refills: 5 | Status: SHIPPED
Start: 2020-12-08 | End: 2021-07-08 | Stop reason: SDUPTHER

## 2020-12-23 RX ORDER — CYCLOBENZAPRINE HCL 5 MG
TABLET ORAL
Qty: 90 TABLET | Refills: 1 | OUTPATIENT
Start: 2020-12-23

## 2020-12-23 NOTE — TELEPHONE ENCOUNTER
Last Appointment:  9/28/2020  Future Appointments   Date Time Provider Yogi Madrigal   1/7/2021  7:30 AM Korin Wilde  [address]

## 2020-12-28 LAB
A/G RATIO: 1 RATIO (ref 1.1–2.2)
ALBUMIN SERPL-MCNC: 3.8 G/DL (ref 3.4–4.8)
ALP BLD-CCNC: 59 U/L (ref 42–121)
ALT SERPL-CCNC: 35 U/L (ref 10–63)
ANION GAP SERPL CALCULATED.3IONS-SCNC: 14 MEQ/L (ref 3–11)
AST SERPL-CCNC: 27 U/L (ref 10–41)
BASOPHILS ABSOLUTE: 0.1 K/UL (ref 0–0.2)
BASOPHILS RELATIVE PERCENT: 0.5 % (ref 0–1.5)
BILIRUB SERPL-MCNC: 0.9 MG/DL (ref 0.3–1.5)
BUN BLDV-MCNC: 21 MG/DL (ref 6–20)
CALCIUM SERPL-MCNC: 9 MG/DL (ref 8.5–10.5)
CHLORIDE BLD-SCNC: 103 MEQ/L (ref 98–107)
CHOLESTEROL: 120 MG/DL (ref 140–200)
CO2: 22 MEQ/L (ref 21–31)
CREAT SERPL-MCNC: 1.1 MG/DL (ref 0.6–1.3)
CREATININE + EGFR PANEL: 81 ML/MIN
CREATININE URINE: 114.3 MG/DL (ref 22–328)
EOSINOPHILS ABSOLUTE: 0.6 K/UL (ref 0–0.33)
EOSINOPHILS RELATIVE PERCENT: 4.7 % (ref 0–3)
GFR NON-AFRICAN AMERICAN: 67 ML/MIN
GLOBULIN: 3.7 G/DL (ref 1.9–3.9)
GLUCOSE BLD-MCNC: 169 MG/DL (ref 70–99)
HCT VFR BLD CALC: 48.8 % (ref 41–50)
HDLC SERPL-MCNC: 40 MG/DL (ref 29–71)
HEMOGLOBIN: 15.7 G/DL (ref 13.5–16.5)
LDL CHOLESTEROL: 57 MG/DL
LDL/HDL RATIO: 1.4 RATIO
LYMPHOCYTES ABSOLUTE: 1.8 K/UL (ref 1.1–4.8)
LYMPHOCYTES RELATIVE PERCENT: 14.6 % (ref 24–44)
MCH RBC QN AUTO: 30.1 PG (ref 28–34)
MCHC RBC AUTO-ENTMCNC: 32.2 G/DL (ref 33–37)
MCV RBC AUTO: 93.5 FL (ref 80–100)
MICROALBUMIN UR-MCNC: 16.1 UG/ML
MICROALBUMIN/CREAT UR-RTO: 14.1 MG/G
MONOCYTES ABSOLUTE: 1.1 K/UL (ref 0.2–0.7)
MONOCYTES RELATIVE PERCENT: 8.8 % (ref 3.4–9)
NEUTROPHILS ABSOLUTE: 8.6 K/UL (ref 1.83–8.7)
PDW BLD-RTO: 15.3 % (ref 10.9–14.3)
PLATELET # BLD: 332 K/UL (ref 150–450)
PMV BLD AUTO: 8.8 FL (ref 7.4–10.4)
POTASSIUM SERPL-SCNC: 4 MEQ/L (ref 3.6–5)
RBC # BLD: 5.22 M/UL (ref 4.5–5.5)
SEGMENTED NEUTROPHILS RELATIVE PERCENT: 71.4 % (ref 40–74)
SODIUM BLD-SCNC: 139 MEQ/L (ref 135–145)
TOTAL PROTEIN: 7.5 G/DL (ref 5.9–7.8)
TRIGL SERPL-MCNC: 148 MG/DL (ref 41–189)
WBC # BLD: 12 K/UL (ref 4.5–11)

## 2021-01-07 ENCOUNTER — OFFICE VISIT (OUTPATIENT)
Dept: FAMILY MEDICINE CLINIC | Age: 66
End: 2021-01-07
Payer: MEDICARE

## 2021-01-07 VITALS
SYSTOLIC BLOOD PRESSURE: 134 MMHG | TEMPERATURE: 97.2 F | HEART RATE: 74 BPM | WEIGHT: 244.4 LBS | BODY MASS INDEX: 33.1 KG/M2 | HEIGHT: 72 IN | DIASTOLIC BLOOD PRESSURE: 68 MMHG | OXYGEN SATURATION: 95 %

## 2021-01-07 DIAGNOSIS — E11.9 DIABETES MELLITUS TYPE 2, INSULIN DEPENDENT (HCC): ICD-10-CM

## 2021-01-07 DIAGNOSIS — K51.90 ULCERATIVE COLITIS WITHOUT COMPLICATIONS, UNSPECIFIED LOCATION (HCC): ICD-10-CM

## 2021-01-07 DIAGNOSIS — F41.9 ANXIETY: ICD-10-CM

## 2021-01-07 DIAGNOSIS — E78.2 MIXED HYPERLIPIDEMIA: ICD-10-CM

## 2021-01-07 DIAGNOSIS — I10 ESSENTIAL HYPERTENSION: ICD-10-CM

## 2021-01-07 DIAGNOSIS — K21.9 GASTROESOPHAGEAL REFLUX DISEASE WITHOUT ESOPHAGITIS: ICD-10-CM

## 2021-01-07 DIAGNOSIS — Z79.4 DIABETES MELLITUS TYPE 2, INSULIN DEPENDENT (HCC): ICD-10-CM

## 2021-01-07 DIAGNOSIS — M62.830 SPASM OF MUSCLE OF LOWER BACK: ICD-10-CM

## 2021-01-07 PROCEDURE — 3017F COLORECTAL CA SCREEN DOC REV: CPT | Performed by: INTERNAL MEDICINE

## 2021-01-07 PROCEDURE — G8417 CALC BMI ABV UP PARAM F/U: HCPCS | Performed by: INTERNAL MEDICINE

## 2021-01-07 PROCEDURE — 1123F ACP DISCUSS/DSCN MKR DOCD: CPT | Performed by: INTERNAL MEDICINE

## 2021-01-07 PROCEDURE — 99214 OFFICE O/P EST MOD 30 MIN: CPT | Performed by: INTERNAL MEDICINE

## 2021-01-07 PROCEDURE — 3288F FALL RISK ASSESSMENT DOCD: CPT | Performed by: INTERNAL MEDICINE

## 2021-01-07 PROCEDURE — G8484 FLU IMMUNIZE NO ADMIN: HCPCS | Performed by: INTERNAL MEDICINE

## 2021-01-07 PROCEDURE — 2022F DILAT RTA XM EVC RTNOPTHY: CPT | Performed by: INTERNAL MEDICINE

## 2021-01-07 PROCEDURE — G8427 DOCREV CUR MEDS BY ELIG CLIN: HCPCS | Performed by: INTERNAL MEDICINE

## 2021-01-07 PROCEDURE — G8510 SCR DEP NEG, NO PLAN REQD: HCPCS | Performed by: INTERNAL MEDICINE

## 2021-01-07 PROCEDURE — 3046F HEMOGLOBIN A1C LEVEL >9.0%: CPT | Performed by: INTERNAL MEDICINE

## 2021-01-07 PROCEDURE — 4040F PNEUMOC VAC/ADMIN/RCVD: CPT | Performed by: INTERNAL MEDICINE

## 2021-01-07 PROCEDURE — 1036F TOBACCO NON-USER: CPT | Performed by: INTERNAL MEDICINE

## 2021-01-07 RX ORDER — PREDNISONE 2.5 MG
2.5 TABLET ORAL DAILY
Qty: 90 TABLET | Refills: 1 | Status: SHIPPED
Start: 2021-01-07 | End: 2021-07-08 | Stop reason: SDUPTHER

## 2021-01-07 RX ORDER — INSULIN LISPRO 100 [IU]/ML
INJECTION, SOLUTION INTRAVENOUS; SUBCUTANEOUS
COMMUNITY
Start: 2020-11-24

## 2021-01-07 RX ORDER — PANTOPRAZOLE SODIUM 40 MG/1
40 TABLET, DELAYED RELEASE ORAL DAILY
Qty: 90 TABLET | Refills: 1 | Status: SHIPPED
Start: 2021-01-07 | End: 2021-07-08 | Stop reason: SDUPTHER

## 2021-01-07 RX ORDER — FENOFIBRATE 145 MG/1
145 TABLET, COATED ORAL DAILY
Qty: 90 TABLET | Refills: 1 | Status: SHIPPED
Start: 2021-01-07 | End: 2021-07-08 | Stop reason: SDUPTHER

## 2021-01-07 RX ORDER — AMLODIPINE BESYLATE 10 MG/1
10 TABLET ORAL DAILY
Qty: 90 TABLET | Refills: 1 | Status: SHIPPED
Start: 2021-01-07 | End: 2021-07-08 | Stop reason: SDUPTHER

## 2021-01-07 RX ORDER — ATORVASTATIN CALCIUM 40 MG/1
40 TABLET, FILM COATED ORAL DAILY
Qty: 90 TABLET | Refills: 1 | Status: SHIPPED
Start: 2021-01-07 | End: 2021-07-08 | Stop reason: SDUPTHER

## 2021-01-07 RX ORDER — MESALAMINE 1.2 G/1
1.2 TABLET, DELAYED RELEASE ORAL 3 TIMES DAILY
Qty: 270 TABLET | Refills: 1 | Status: SHIPPED
Start: 2021-01-07 | End: 2021-07-08 | Stop reason: SDUPTHER

## 2021-01-07 RX ORDER — CYCLOBENZAPRINE HCL 5 MG
5 TABLET ORAL EVERY 8 HOURS PRN
Qty: 90 TABLET | Refills: 1 | Status: SHIPPED
Start: 2021-01-07 | End: 2021-05-31 | Stop reason: SDUPTHER

## 2021-01-07 ASSESSMENT — PATIENT HEALTH QUESTIONNAIRE - PHQ9
2. FEELING DOWN, DEPRESSED OR HOPELESS: 0
SUM OF ALL RESPONSES TO PHQ9 QUESTIONS 1 & 2: 0
SUM OF ALL RESPONSES TO PHQ QUESTIONS 1-9: 0

## 2021-01-07 NOTE — PROGRESS NOTES
[address] [facility] [employer]     21  Mayra Soares : 1955 Sex: male  Age: 72 y.o. Chief Complaint   Patient presents with    Hypertension    Hyperlipidemia       HPI    Patient presents today for follow-up visit on his multiple medical problems. Reviewed last couple notes. Since I have seen him he has seen pharmacy related to his diabetic control. Last hemoglobin A1c was 6.7. He is also seen cardiology in the interim no significant changes made at that visit. Tells me blood pressures been in good range. Lipids have been good on his statin medication which he is tolerating. His anxiety is under control on his SSRI. Does not wear CPAP secondary to claustrophobia. He does continue to vape but states he is weaning down. I told him he needs to get off of this. Still has not had his low-dose CT of the chest screening states he wants to wait now. Weight gain of 8 pounds since last seen  Patient never did see dermatology for his atypical nevi that I sent him for nor has he had low-dose CAT scan done yet plans to do both sometime in the near future and I stressed compliance. He continues to follow-up with pharmacy, cardiology, ophthalmology, urology, GI.     Review of Systems     Const: Denies chills, fever. Eyes: Denies eye symptoms States he is up to date with his eyes  ENMT: Denies ear symptoms. Reports postnasal drip, but denies other nasal symptoms. Denies  mouth or throat symptoms. CV: Denies chest pain, orthopnea and palpitations.  Positive for edema lower extremities right greater than left-some improved. This is side that he had vein harvesting on. Resp: Denieswheezing.  Positive for shortness of breath-occasional with overexertion. Also admits to occasional cough  GI: Denies diarrhea, nausea and vomiting. : Urinary: denies dysuria, frequency and frequent UTI's. Musculo: Reports arthritis. Skin: Denies eczema, pruritus and sores.   Neuro: Denies dizziness, headache, seizures and syncope. Psych: Anxious-improved on Zoloft       REST OF PERTINENT ROS GONE OVER AND WAS NEGATIVE. PMH:  Problem List: Chronic ulcerative enterocolitis, Hyperlipidemia, Type 2 diabetes mellitus, Benign essential  hypertension  Health Maintenance:  Bone Density Scan - (10/16/2014)  Colonoscopy Screening - (10/20/2014)  Colonoscopy - (2/21/2008)  Colonoscopy - (5/28/2010)  Colonoscopy - (6/29/2012)  Colonoscopy - (10/20/2014)  Influenza Vaccination - (9/11/2015)  Colonoscopy - 2/08,5/10,12/11,6/12,10/14-Dr swartz, 6/20-due 21  Rectal Exam - 5/09,9/10,12/11,7/13-declined  PSA Test - 5/09,9/10,4/12,7/13  Prostate Exam - 5/09,9/10,12/11,7/13-declined  EKG - 5/09,11/13  EGD - Dr. Katina Alvarado  Medical Problems:  Ulcerative Colitis - follows with kolomaria de jesus  Hyperlipidemia  chronic right shoulder pain - ac arthritis and rotator cuff tear  olecranon septic bursitis, Hypertension, Type 2 Diabetes  Headache - cluster  microdiscectomy L4L5  Sleep Apnea - uvulectomy- can't wear mask-declined further eval  Osteopenia  microscopic hematuria - Was evaluated by urology. Diverticulosis, Proteinuria, Colon Polyps, poor compliance  CAD - CABG 2017  Barrettes Esophagitis, Fatty Liver, Kidney Stones  Follows with - Cardiology ,pharmacist, ophthalmology, GI, urology  FH:  Father:  Coronary Artery Disease (CAD), Non Insulin Dependent Diabetes. Mother:  CLL, Hypertension, Chronic Obstructive Pulmonary Disease (COPD). Sister 1:  osteopenia. Paternal Uncle 1:  Lung Cancer. SH:  . (Marital)  Personal Habits: Cigarette Use: vapes. Alcohol: Current Alcohol Use              Current Outpatient Medications:     insulin lispro, 1 Unit Dial, 100 UNIT/ML SOPN, Adjust units accordingly as needed, Disp: , Rfl:     amLODIPine (NORVASC) 10 MG tablet, Take 1 tablet by mouth daily, Disp: 90 tablet, Rfl: 1    atorvastatin (LIPITOR) 40 MG tablet, Take 1 tablet by mouth daily, Disp: 90 tablet, Rfl: 1    cyclobenzaprine (FLEXERIL) 5 MG tablet, Take 1 tablet by mouth every 8 hours as needed for Muscle spasms (in the low back), Disp: 90 tablet, Rfl: 1    fenofibrate (TRICOR) 145 MG tablet, Take 1 tablet by mouth daily, Disp: 90 tablet, Rfl: 1    mesalamine (LIALDA) 1.2 g EC tablet, Take 1 tablet by mouth 3 times daily, Disp: 270 tablet, Rfl: 1    metFORMIN (GLUCOPHAGE) 500 MG tablet, Take 2 tablets by mouth 2 times daily (with meals), Disp: 360 tablet, Rfl: 1    pantoprazole (PROTONIX) 40 MG tablet, Take 1 tablet by mouth daily, Disp: 90 tablet, Rfl: 1    sertraline (ZOLOFT) 50 MG tablet, TAKE 1 TABLET DAILY, Disp: 90 tablet, Rfl: 1    predniSONE (DELTASONE) 2.5 MG tablet, Take 1 tablet by mouth daily, Disp: 90 tablet, Rfl: 1    naproxen (NAPROSYN) 500 MG tablet, take 1 tablet by mouth twice a day if needed for pain, Disp: 60 tablet, Rfl: 5    metoprolol tartrate (LOPRESSOR) 25 MG tablet, Take 1 tablet by mouth 2 times daily, Disp: 180 tablet, Rfl: 1    insulin glargine (LANTUS) 100 UNIT/ML injection vial, Inject into the skin nightly 75 units, Disp: , Rfl:     OneTouch Delica Lancets 97H MISC, 1 Device by Other route 4 times daily, Disp: 200 each, Rfl: 6    vitamin D (ERGOCALCIFEROL) 1.25 MG (74641 UT) CAPS capsule, Take 1 capsule by mouth every 30 days, Disp: 6 capsule, Rfl: 0    vitamin C (ASCORBIC ACID) 500 MG tablet, Take 1 tablet by mouth daily, Disp: 30 tablet, Rfl: 5    Multiple Vitamin (MULTIVITAMIN) tablet, Take 1 tablet by mouth daily Dispense tab-a-cathy if available, Disp: 30 tablet, Rfl: 5    blood glucose test strips (ONE TOUCH ULTRA TEST) strip, TEST four times a day, Disp: 400 strip, Rfl: 5    Elastic Bandages & Supports (MEDICAL COMPRESSION STOCKINGS) MISC, 1 each by Does not apply route daily as needed (edema) JOBST STOCKINGS 20-30MG MERCURY, Disp: 1 each, Rfl: 0    ONETOUCH DELICA LANCETS FINE MISC, 1 each by Other route 4 times daily, Disp: 100 each, Rfl: 3    empagliflozin (JARDIANCE) 25 MG tablet, Take 25 mg by mouth daily, Disp: , Rfl:     Omega-3 Fatty Acids (FISH OIL) 1000 MG CAPS, Take 3 capsules by mouth daily (Patient taking differently: Take 1,000 mg by mouth daily ), Disp: 90 capsule, Rfl: 6    azaTHIOprine (IMURAN) 50 MG tablet, take 1 tablet by mouth three times a day, Disp: 90 tablet, Rfl: 3    Insulin Pen Needle 31G X 8 MM MISC, For injections of insulin 4 times a day, Disp: 120 each, Rfl: 5    Dulaglutide (TRULICITY) 1.5 SR/7.8GO SOPN, Inject into the skin, Disp: , Rfl:     aspirin 81 MG tablet, Take 81 mg by mouth daily, Disp: , Rfl:     insulin glargine (TOUJEO SOLOSTAR) 300 UNIT/ML injection pen, Inject 75 Units into the skin once a week , Disp: , Rfl:   Allergies   Allergen Reactions    Lisinopril Swelling     Tongue swells     Ace Inhibitors     Amino Acids        Past Medical History:   Diagnosis Date    Arthritis     Colitis     Colon polyps     Coronary artery disease of native artery of native heart with stable angina pectoris (HCC)     Diabetes mellitus ([facility] [address]     Diverticulosis     Headache     Hyperlipidemia     Hypertension     Osteopenia     Sleep apnea      Past Surgical History:   Procedure Laterality Date    APPENDECTOMY      BACK SURGERY      CARDIAC CATHETERIZATION  03/29/2017    Dr. Diego Purcell    COLONOSCOPY     [address] GRAFT  04/06/2017    x2    ENDOSCOPY, COLON, DIAGNOSTIC      FRACTURE SURGERY      wrist    FRACTURE SURGERY      left tibia    FRACTURE SURGERY      fingers    FRACTURE SURGERY      collar bone    UVULECTOMY       Family History   Problem Relation Age of Onset    Asthma Mother     Diabetes Father     Heart Disease Father     Coronary Art Dis Father      Social History     Socioeconomic History    Marital status:      Spouse name: Not on file    Number of children: Not on file    Years of education: Not on file    Highest education level: Not on file   Occupational History    Not on file   Social Needs    Financial resource strain: Not on file    Food insecurity     Worry: Not on file     Inability: Not on file    Transportation needs     Medical: Not on file     Non-medical: Not on file   Tobacco Use    Smoking status: Former Smoker     Packs/day: 1.50     Years: 35.00     Pack years: 52.50     Types: E-Cigarettes, Cigarettes     Quit date: 2015     Years since quittin.0    Smokeless tobacco: Never Used    Tobacco comment: currently uses vapor cigarettes   Substance and Sexual Activity    Alcohol use: Yes     Alcohol/week: 10.0 standard drinks     Types: 10 Cans of beer per week     Comment: occasionally    Drug use: No    Sexual activity: Not on file   Lifestyle    Physical activity     Days per week: Not on file     Minutes per session: Not on file    Stress: Not on file   Relationships    Social connections     Talks on phone: Not on file     Gets together: Not on file     Attends Congregation service: Not on file     Active member of club or organization: Not on file     Attends meetings of clubs or organizations: Not on file     Relationship status: Not on file    Intimate partner violence     Fear of current or ex partner: Not on file     Emotionally abused: Not on file     Physically abused: Not on file     Forced sexual activity: Not on file   Other Topics Concern    Not on file   Social History Narrative    Not on file       Vitals:    21 0723   BP: 134/68   Pulse: 74   Temp: 97.2 °F (36.2 °C)   TempSrc: Temporal   SpO2: 95%   Weight: 244 lb 6.4 oz (110.9 kg)   Height: 6' (1.829 m)       Physical Exam    Constitutional: He is oriented to person, place, and time. He appears well-developed and well-nourished. No distress.   Overweight   Neck: Normal range of motion. Neck supple. No JVD present. No thyromegaly present. Cardiovascular: Normal rate, regular rhythm, normal heart sounds and intact distal pulses. Exam reveals no gallop and no friction rub.   No murmur heard.   Mid sternotomy scar from his CABG   Pulmonary/Chest: Effort normal and breath sounds normal. No respiratory distress. He has no wheezes. He has no rales. Abdominal: Soft. Bowel sounds are normal. He exhibits no distension and no mass. There is no tenderness. Musculoskeletal: Normal range of motion.   Trace edema to feet and ankles bilaterally right greater than left. Pulses are palpable. He does have a couple open sores which look to be healing and scabbing over. I did ask him to watch this closely. He is a . Lymphadenopathy:     He has no cervical adenopathy. Neurological: He is alert and oriented to person, place, and time. No sensory deficit. He exhibits normal muscle tone. Coordination normal.   Skin: Skin is warm and dry.  Does have several nevi on his back.  I did ask him to see dermatology at least once for an evaluation to make sure none of these need biopsy. patient never did follow through with this. Again stressed compliance. psychiatric: He has a normal mood and affect. His behavior is normal.   Nursing note and vitals reviewed.           Assessment and Plan:  Abiel Acosta was seen today for hypertension and hyperlipidemia. Diagnoses and all orders for this visit:    Essential hypertension  -     amLODIPine (NORVASC) 10 MG tablet; Take 1 tablet by mouth daily  Stable on medication    Mixed hyperlipidemia  -     atorvastatin (LIPITOR) 40 MG tablet; Take 1 tablet by mouth daily  -     fenofibrate (TRICOR) 145 MG tablet; Take 1 tablet by mouth daily  Stable on statin medication    Spasm of muscle of lower back  -     cyclobenzaprine (FLEXERIL) 5 MG tablet; Take 1 tablet by mouth every 8 hours as needed for Muscle spasms (in the low back)    Ulcerative colitis without complications, unspecified location (HCC)  -     mesalamine (LIALDA) 1.2 g EC tablet; Take 1 tablet by mouth 3 times daily  -     predniSONE (DELTASONE) 2.5 MG tablet;  Take 1 tablet by mouth daily  Stable and following with GI    Diabetes mellitus type 2, insulin dependent (HCC)  -     metFORMIN (GLUCOPHAGE) 500 MG tablet; Take 2 tablets by mouth 2 times daily (with meals)  Stable and following with pharmacy    Gastroesophageal reflux disease without esophagitis  -     pantoprazole (PROTONIX) 40 MG tablet; Take 1 tablet by mouth daily  Stable on medication    Anxiety  -     sertraline (ZOLOFT) 50 MG tablet; TAKE 1 TABLET DAILY  Stable on SSRI    Plan: I will see him back in 3 months and as needed. Continue to monitor blood pressure closely. Prescription management performed. Vaping counseling. Reviewed recent blood work that was ordered in between visits. Everything looked pretty stable. Weight loss attempts. Continue to follow with above consultants. Notify us with problems in the interim. Return in about 3 months (around 4/7/2021). Seen By:  Monica Martinez MD      *Document was created using voice recognition software. Note was reviewed however may contain grammatical errors.

## 2021-02-24 LAB — DIABETIC RETINOPATHY: NEGATIVE

## 2021-04-08 ENCOUNTER — OFFICE VISIT (OUTPATIENT)
Dept: FAMILY MEDICINE CLINIC | Age: 66
End: 2021-04-08
Payer: MEDICARE

## 2021-04-08 VITALS
DIASTOLIC BLOOD PRESSURE: 66 MMHG | OXYGEN SATURATION: 97 % | SYSTOLIC BLOOD PRESSURE: 134 MMHG | TEMPERATURE: 97.6 F | WEIGHT: 243.6 LBS | HEART RATE: 70 BPM | HEIGHT: 72 IN | BODY MASS INDEX: 33 KG/M2

## 2021-04-08 DIAGNOSIS — Z79.4 DIABETES MELLITUS TYPE 2, INSULIN DEPENDENT (HCC): ICD-10-CM

## 2021-04-08 DIAGNOSIS — M85.80 OSTEOPENIA, UNSPECIFIED LOCATION: ICD-10-CM

## 2021-04-08 DIAGNOSIS — I25.118 CORONARY ARTERY DISEASE OF NATIVE ARTERY OF NATIVE HEART WITH STABLE ANGINA PECTORIS (HCC): ICD-10-CM

## 2021-04-08 DIAGNOSIS — E55.9 VITAMIN D DEFICIENCY: ICD-10-CM

## 2021-04-08 DIAGNOSIS — K51.90 ULCERATIVE COLITIS WITHOUT COMPLICATIONS, UNSPECIFIED LOCATION (HCC): ICD-10-CM

## 2021-04-08 DIAGNOSIS — E78.2 MIXED HYPERLIPIDEMIA: ICD-10-CM

## 2021-04-08 DIAGNOSIS — K22.70 BARRETT'S ESOPHAGUS WITHOUT DYSPLASIA: ICD-10-CM

## 2021-04-08 DIAGNOSIS — I10 ESSENTIAL HYPERTENSION: ICD-10-CM

## 2021-04-08 DIAGNOSIS — Z79.52 LONG TERM SYSTEMIC STEROID USER: ICD-10-CM

## 2021-04-08 DIAGNOSIS — E11.9 DIABETES MELLITUS TYPE 2, INSULIN DEPENDENT (HCC): ICD-10-CM

## 2021-04-08 PROCEDURE — 99214 OFFICE O/P EST MOD 30 MIN: CPT | Performed by: INTERNAL MEDICINE

## 2021-04-08 PROCEDURE — G8427 DOCREV CUR MEDS BY ELIG CLIN: HCPCS | Performed by: INTERNAL MEDICINE

## 2021-04-08 PROCEDURE — 1036F TOBACCO NON-USER: CPT | Performed by: INTERNAL MEDICINE

## 2021-04-08 PROCEDURE — 4040F PNEUMOC VAC/ADMIN/RCVD: CPT | Performed by: INTERNAL MEDICINE

## 2021-04-08 PROCEDURE — 1123F ACP DISCUSS/DSCN MKR DOCD: CPT | Performed by: INTERNAL MEDICINE

## 2021-04-08 PROCEDURE — 3046F HEMOGLOBIN A1C LEVEL >9.0%: CPT | Performed by: INTERNAL MEDICINE

## 2021-04-08 PROCEDURE — 3017F COLORECTAL CA SCREEN DOC REV: CPT | Performed by: INTERNAL MEDICINE

## 2021-04-08 PROCEDURE — G8417 CALC BMI ABV UP PARAM F/U: HCPCS | Performed by: INTERNAL MEDICINE

## 2021-04-08 PROCEDURE — 2022F DILAT RTA XM EVC RTNOPTHY: CPT | Performed by: INTERNAL MEDICINE

## 2021-04-08 RX ORDER — MULTIVITAMIN WITH IRON
100 TABLET ORAL DAILY
COMMUNITY
End: 2021-04-08 | Stop reason: ALTCHOICE

## 2021-04-08 NOTE — PROGRESS NOTES
[facility]     21  Donnell Richardson : 1955 Sex: male  Age: 72 y.o. Chief Complaint   Patient presents with    Hyperlipidemia    Hypertension       HPI    Patient presents today for 3-month follow-up visit on his medical problems. His diabetes seems to be under reasonably good control and is being managed currently by pharmacy. Last hemoglobin A1c was 7.1. Tells me blood pressure numbers have been good. His anxiety is under good control on his SSRI. Lipids have been stable on his statin medication. CAD has been stable and he is up-to-date with cardiology. Does not wear CPAP secondary to claustrophobia. He does continue to vape but states he is weaning down. Patient never did see dermatology for his atypical nevi that I sent him for nor has he had low-dose CAT scan done yet plans to do both sometime in the near future and I stressed compliance. He continues to follow-up with pharmacy, cardiology, ophthalmology, urology, GI.        Review of Systems     Const: Denies chills, fever. Eyes: Denies eye symptoms States he is up to date with his eyes  ENMT: Denies ear symptoms. Reports postnasal drip, but denies other nasal symptoms. Denies  mouth or throat symptoms. CV: Denies chest pain, orthopnea and palpitations.  Positive for edema lower extremities right greater than left-some improved.  This is side that he had vein harvesting on. Resp: Denieswheezing.  Positive for shortness of breath-occasional with overexertion.  Also admits to occasional cough  GI: Denies diarrhea, nausea and vomiting. : Urinary: denies dysuria, frequency and frequent UTI's. Musculo: Reports arthritis. Skin: Denies eczema, pruritus and sores. Neuro: Denies dizziness, headache, seizures and syncope. Psych: Anxious-improved on Zoloft           REST OF PERTINENT ROS GONE OVER AND WAS NEGATIVE.        PMH:  Problem List: Chronic ulcerative enterocolitis, Hyperlipidemia, Type 2 diabetes mellitus, Benign essential  hypertension  Health Maintenance:  Bone Density Scan - (10/16/2014)  Colonoscopy Screening - (10/20/2014)  Colonoscopy - (2/21/2008)  Colonoscopy - (5/28/2010)  Colonoscopy - (6/29/2012)  Colonoscopy - (10/20/2014)  Influenza Vaccination - (9/11/2015)  Colonoscopy - 2/08,5/10,12/11,6/12,10/14-Dr swartz, 6/20-due 21  Rectal Exam - 5/09,9/10,12/11,7/13-declined  PSA Test - 5/09,9/10,4/12,7/13  Prostate Exam - 5/09,9/10,12/11,7/13-declined  EKG - 5/09,11/13  EGD - Dr. Cinda Loo Problems:  Ulcerative Colitis - follows with kolosi  Hyperlipidemia  chronic right shoulder pain - ac arthritis and rotator cuff tear  olecranon septic bursitis, Hypertension, Type 2 Diabetes  Headache - cluster  microdiscectomy L4L5  Sleep Apnea - uvulectomy- can't wear mask-declined further eval  Osteopenia  microscopic hematuria - Was evaluated by urology. Diverticulosis, Proteinuria, Colon Polyps, poor compliance  CAD - CABG 2017  Barrettes Esophagitis, Fatty Liver, Kidney Stones  Follows with - Cardiology ,pharmacist, ophthalmology, GI, urology  FH:  Father:  Coronary Artery Disease (CAD), Non Insulin Dependent Diabetes. Mother:  CLL, Hypertension, Chronic Obstructive Pulmonary Disease (COPD). Sister 1:  osteopenia. Paternal Uncle 1:  Lung Cancer. SH:  . (Marital)  Personal Habits: Cigarette Use: vapes. Alcohol: Current Alcohol Use                Current Outpatient Medications:     metoprolol tartrate (LOPRESSOR) 25 MG tablet, Take 1 tablet by mouth 2 times daily, Disp: 180 tablet, Rfl: 1    blood glucose test strips (ONETOUCH ULTRA) strip, TEST 4 TIMES DAILY, Disp: 400 strip, Rfl: 1    insulin lispro, 1 Unit Dial, 100 UNIT/ML SOPN, Adjust units accordingly as needed, Disp: , Rfl:     amLODIPine (NORVASC) 10 MG tablet, Take 1 tablet by mouth daily, Disp: 90 tablet, Rfl: 1    atorvastatin (LIPITOR) 40 MG tablet, Take 1 tablet by mouth daily, Disp: 90 tablet, Rfl: 1    cyclobenzaprine (FLEXERIL) 5 MG tablet, Take 2015     Years since quittin.2    Smokeless tobacco: Never Used    Tobacco comment: currently uses vapor cigarettes   Substance and Sexual Activity    Alcohol use: Yes     Alcohol/week: 10.0 standard drinks     Types: 10 Cans of beer per week     Comment: occasionally    Drug use: No    Sexual activity: Not on file   Lifestyle    Physical activity     Days per week: Not on file     Minutes per session: Not on file    Stress: Not on file   Relationships    Social connections     Talks on phone: Not on file     Gets together: Not on file     Attends Synagogue service: Not on file     Active member of club or organization: Not on file     Attends meetings of clubs or organizations: Not on file     Relationship status: Not on file    Intimate partner violence     Fear of current or ex partner: Not on file     Emotionally abused: Not on file     Physically abused: Not on file     Forced sexual activity: Not on file   Other Topics Concern    Not on file   Social History Narrative    Not on file       Vitals:    21 0655   BP: 134/66   Pulse: 70   Temp: 97.6 °F (36.4 °C)   TempSrc: Temporal   SpO2: 97%   Weight: 243 lb 9.6 oz (110.5 kg)   Height: 6' (1.829 m)       Physical Exam      Constitutional: He is oriented to person, place, and time. He appears well-developed and well-nourished. No distress.   Overweight   Neck: Normal range of motion. Neck supple. No JVD present. No thyromegaly present. Cardiovascular: Normal rate, regular rhythm, normal heart sounds and intact distal pulses. Exam reveals no gallop and no friction rub.   No murmur heard. Mid sternotomy scar from his CABG   Pulmonary/Chest: Effort normal and breath sounds normal. No respiratory distress. He has no wheezes. He has no rales. Abdominal: Soft. Bowel sounds are normal. He exhibits no distension and no mass. There is no tenderness.    Musculoskeletal: Normal range of motion.   Pulses palpable distally lymphadenopathy:   [facility] has no cervical adenopathy. Neurological: He is alert and oriented to person, place, and time. No sensory deficit. He exhibits normal muscle tone. Coordination normal.   Skin: Skin is warm and dry.  Does have several nevi on his back.  I did ask him to see dermatology at least once for an evaluation to make sure none of these need biopsy.  patient never did follow through with this.    psychiatric: He has a normal mood and affect. His behavior is normal.   Nursing note and vitals reviewed.           Assessment and Plan:  Debbie Winkler was seen today for hyperlipidemia and hypertension. Diagnoses and all orders for this visit:    Essential hypertension  -     CBC Auto Differential; Future  -     Comprehensive Metabolic Panel; Future  Stable on antihypertensive medication    Long term systemic steroid user  -     DEXA BONE DENSITY 2 SITES; Future    Coronary artery disease of native artery of native heart with stable angina pectoris (HCC)  -     metoprolol tartrate (LOPRESSOR) 25 MG tablet; Take 1 tablet by mouth 2 times daily  Stable    Osteopenia, unspecified location    Mixed hyperlipidemia  -     Lipid Panel; Future  Stable on statin medication    Vitamin D deficiency  -     Vitamin D 25 Hydroxy; Future    Diabetes mellitus type 2, insulin dependent (HCC)  -     Microalbumin / Creatinine Urine Ratio; Future  Stable with hemoglobin A1c 7.0    Ulcerative colitis without complications, unspecified location ([facility] [address]  Stable for upcoming scope    Veliz's esophagus without dysplasia  Stable for upcoming scope    Plan: Patient wished to get blood work before next visit lab order was placed. Continue to follow-up with above specialist.  I will see back in 3 months and as needed. Prescription management performed after decision to continue medication that is successfully treating his chronic condition.   We also set up DEXA scan for next visit as he does have osteopenia and is on chronic low-dose steroid related to his ulcerative colitis. Continue to monitor blood pressures closely. Weight loss attempts. Notify us of problems in the interim. As I was about to leave the room he asked me to take a look at a lesion to his lower lip inner aspect. Unsure if he bit his lip or not. There is a what looks like a blood blister to the area. I did ask him to watch this and notify me if not improving in the next 1 to 2 weeks. Return in about 3 months (around 7/8/2021). Seen By:  Mary Sands MD      *Document was created using voice recognition software. Note was reviewed however may contain grammatical errors.

## 2021-05-31 DIAGNOSIS — M62.830 SPASM OF MUSCLE OF LOWER BACK: ICD-10-CM

## 2021-06-01 RX ORDER — CYCLOBENZAPRINE HCL 5 MG
5 TABLET ORAL EVERY 8 HOURS PRN
Qty: 90 TABLET | Refills: 1 | Status: SHIPPED
Start: 2021-06-01 | End: 2022-01-19 | Stop reason: SDUPTHER

## 2021-06-01 NOTE — TELEPHONE ENCOUNTER
Last Appointment:  4/8/2021  Future Appointments   Date Time Provider Yogi Madrigal   7/8/2021  7:45 AM Loli Enriquez  [address] [place] [address]   7/8/2021  8:30 AM [address]

## 2021-06-11 LAB
AVERAGE GLUCOSE: NORMAL
CHP ED QC CHECK: NORMAL
GLUCOSE BLD-MCNC: 112 MG/DL
HBA1C MFR BLD: 6.5 %

## 2021-06-30 ENCOUNTER — TELEPHONE (OUTPATIENT)
Dept: FAMILY MEDICINE CLINIC | Age: 66
End: 2021-06-30

## 2021-06-30 LAB
25-HYDROXY VITAMIN D-3: 23.7 NG/ML (ref 30–100)
A/G RATIO: 1.2 RATIO (ref 1.1–2.2)
ALBUMIN SERPL-MCNC: 3.8 G/DL (ref 3.4–4.8)
ALP BLD-CCNC: 60 U/L (ref 42–121)
ALT SERPL-CCNC: 31 U/L (ref 10–63)
ANION GAP SERPL CALCULATED.3IONS-SCNC: 11 MEQ/L (ref 3–11)
AST SERPL-CCNC: 28 U/L (ref 10–41)
BASOPHILS ABSOLUTE: 0.1 K/UL (ref 0–0.2)
BASOPHILS RELATIVE PERCENT: 0.6 % (ref 0–1.5)
BILIRUB SERPL-MCNC: 0.7 MG/DL (ref 0.3–1.5)
BUN BLDV-MCNC: 15 MG/DL (ref 6–20)
CALCIUM SERPL-MCNC: 8.7 MG/DL (ref 8.5–10.5)
CHLORIDE BLD-SCNC: 106 MEQ/L (ref 98–107)
CHOLESTEROL: 123 MG/DL (ref 140–200)
CO2: 23 MEQ/L (ref 21–31)
CREAT SERPL-MCNC: 1.3 MG/DL (ref 0.6–1.3)
CREATININE + EGFR PANEL: 67 ML/MIN
CREATININE URINE: 134.4 MG/DL (ref 22–328)
EOSINOPHILS ABSOLUTE: 0.3 K/UL (ref 0–0.33)
EOSINOPHILS RELATIVE PERCENT: 3.1 % (ref 0–3)
GFR NON-AFRICAN AMERICAN: 55 ML/MIN
GLOBULIN: 3.1 G/DL (ref 1.9–3.9)
GLUCOSE BLD-MCNC: 102 MG/DL (ref 70–99)
HCT VFR BLD CALC: 45.4 % (ref 41–50)
HDLC SERPL-MCNC: 43 MG/DL (ref 29–71)
HEMOGLOBIN: 15.3 G/DL (ref 13.5–16.5)
LDL CHOLESTEROL: 59 MG/DL
LDL/HDL RATIO: 1.4 RATIO
LYMPHOCYTES ABSOLUTE: 2.4 K/UL (ref 1.1–4.8)
LYMPHOCYTES RELATIVE PERCENT: 23.9 % (ref 24–44)
MCH RBC QN AUTO: 31.1 PG (ref 28–34)
MCHC RBC AUTO-ENTMCNC: 33.7 G/DL (ref 33–37)
MCV RBC AUTO: 92.4 FL (ref 80–100)
MICROALBUMIN UR-MCNC: 4.2 UG/ML
MICROALBUMIN/CREAT UR-RTO: 3.1 MG/G
MONOCYTES ABSOLUTE: 0.9 K/UL (ref 0.2–0.7)
MONOCYTES RELATIVE PERCENT: 9.1 % (ref 3.4–9)
NEUTROPHILS ABSOLUTE: 6.4 K/UL (ref 1.83–8.7)
PDW BLD-RTO: 16.3 % (ref 10.9–14.3)
PLATELET # BLD: 350 K/UL (ref 150–450)
PMV BLD AUTO: 8.4 FL (ref 7.4–10.4)
POTASSIUM SERPL-SCNC: 4 MEQ/L (ref 3.6–5)
RBC # BLD: 4.92 M/UL (ref 4.5–5.5)
SEGMENTED NEUTROPHILS RELATIVE PERCENT: 63.3 % (ref 40–74)
SODIUM BLD-SCNC: 140 MEQ/L (ref 135–145)
TOTAL PROTEIN: 6.9 G/DL (ref 5.9–7.8)
TRIGL SERPL-MCNC: 109 MG/DL (ref 41–189)
WBC # BLD: 10.1 K/UL (ref 4.5–11)

## 2021-07-01 ENCOUNTER — TELEPHONE (OUTPATIENT)
Dept: FAMILY MEDICINE CLINIC | Age: 66
End: 2021-07-01

## 2021-07-01 NOTE — TELEPHONE ENCOUNTER
Previous labs reported as normal.  Vitamin D just came in low at 21. Have him increase his vitamin D from once monthly to once every other week.

## 2021-07-08 ENCOUNTER — OFFICE VISIT (OUTPATIENT)
Dept: FAMILY MEDICINE CLINIC | Age: 66
End: 2021-07-08
Payer: MEDICARE

## 2021-07-08 ENCOUNTER — TELEPHONE (OUTPATIENT)
Dept: FAMILY MEDICINE CLINIC | Age: 66
End: 2021-07-08

## 2021-07-08 VITALS
HEIGHT: 72 IN | DIASTOLIC BLOOD PRESSURE: 64 MMHG | HEART RATE: 73 BPM | SYSTOLIC BLOOD PRESSURE: 138 MMHG | BODY MASS INDEX: 32.1 KG/M2 | OXYGEN SATURATION: 96 % | WEIGHT: 237 LBS | TEMPERATURE: 97.1 F

## 2021-07-08 DIAGNOSIS — I10 ESSENTIAL HYPERTENSION: ICD-10-CM

## 2021-07-08 DIAGNOSIS — K21.9 GASTROESOPHAGEAL REFLUX DISEASE WITHOUT ESOPHAGITIS: ICD-10-CM

## 2021-07-08 DIAGNOSIS — E78.2 MIXED HYPERLIPIDEMIA: ICD-10-CM

## 2021-07-08 DIAGNOSIS — F41.9 ANXIETY: ICD-10-CM

## 2021-07-08 DIAGNOSIS — E11.9 DIABETES MELLITUS TYPE 2, INSULIN DEPENDENT (HCC): ICD-10-CM

## 2021-07-08 DIAGNOSIS — I25.118 CORONARY ARTERY DISEASE OF NATIVE ARTERY OF NATIVE HEART WITH STABLE ANGINA PECTORIS (HCC): ICD-10-CM

## 2021-07-08 DIAGNOSIS — E55.9 VITAMIN D DEFICIENCY: ICD-10-CM

## 2021-07-08 DIAGNOSIS — K51.90 ULCERATIVE COLITIS WITHOUT COMPLICATIONS, UNSPECIFIED LOCATION (HCC): ICD-10-CM

## 2021-07-08 DIAGNOSIS — Z79.4 DIABETES MELLITUS TYPE 2, INSULIN DEPENDENT (HCC): ICD-10-CM

## 2021-07-08 PROCEDURE — 1036F TOBACCO NON-USER: CPT | Performed by: INTERNAL MEDICINE

## 2021-07-08 PROCEDURE — 2022F DILAT RTA XM EVC RTNOPTHY: CPT | Performed by: INTERNAL MEDICINE

## 2021-07-08 PROCEDURE — 3017F COLORECTAL CA SCREEN DOC REV: CPT | Performed by: INTERNAL MEDICINE

## 2021-07-08 PROCEDURE — 99214 OFFICE O/P EST MOD 30 MIN: CPT | Performed by: INTERNAL MEDICINE

## 2021-07-08 PROCEDURE — G8427 DOCREV CUR MEDS BY ELIG CLIN: HCPCS | Performed by: INTERNAL MEDICINE

## 2021-07-08 PROCEDURE — G8417 CALC BMI ABV UP PARAM F/U: HCPCS | Performed by: INTERNAL MEDICINE

## 2021-07-08 PROCEDURE — 1123F ACP DISCUSS/DSCN MKR DOCD: CPT | Performed by: INTERNAL MEDICINE

## 2021-07-08 PROCEDURE — 4040F PNEUMOC VAC/ADMIN/RCVD: CPT | Performed by: INTERNAL MEDICINE

## 2021-07-08 PROCEDURE — 3044F HG A1C LEVEL LT 7.0%: CPT | Performed by: INTERNAL MEDICINE

## 2021-07-08 RX ORDER — AMLODIPINE BESYLATE 10 MG/1
10 TABLET ORAL DAILY
Qty: 90 TABLET | Refills: 1 | Status: SHIPPED
Start: 2021-07-08 | End: 2022-01-19 | Stop reason: SDUPTHER

## 2021-07-08 RX ORDER — MESALAMINE 1.2 G/1
1.2 TABLET, DELAYED RELEASE ORAL 3 TIMES DAILY
Qty: 270 TABLET | Refills: 1 | Status: SHIPPED
Start: 2021-07-08 | End: 2021-12-19 | Stop reason: SDUPTHER

## 2021-07-08 RX ORDER — NAPROXEN 500 MG/1
TABLET ORAL
Qty: 60 TABLET | Refills: 5 | Status: SHIPPED | OUTPATIENT
Start: 2021-07-08

## 2021-07-08 RX ORDER — FENOFIBRATE 145 MG/1
145 TABLET, COATED ORAL DAILY
Qty: 90 TABLET | Refills: 1 | Status: SHIPPED
Start: 2021-07-08 | End: 2022-02-10 | Stop reason: SDUPTHER

## 2021-07-08 RX ORDER — PANTOPRAZOLE SODIUM 40 MG/1
40 TABLET, DELAYED RELEASE ORAL DAILY
Qty: 90 TABLET | Refills: 1 | Status: SHIPPED
Start: 2021-07-08 | End: 2022-01-19 | Stop reason: SDUPTHER

## 2021-07-08 RX ORDER — ATORVASTATIN CALCIUM 40 MG/1
40 TABLET, FILM COATED ORAL DAILY
Qty: 90 TABLET | Refills: 1 | Status: SHIPPED
Start: 2021-07-08 | End: 2022-02-10 | Stop reason: SDUPTHER

## 2021-07-08 RX ORDER — PREDNISONE 2.5 MG
2.5 TABLET ORAL DAILY
Qty: 90 TABLET | Refills: 1 | Status: SHIPPED
Start: 2021-07-08 | End: 2022-01-19 | Stop reason: SDUPTHER

## 2021-07-08 NOTE — PROGRESS NOTES
[address] [facility]     21  Art Franz : 1955 Sex: male  Age: 77 y.o. Chief Complaint   Patient presents with    Hypertension     3 months       HPI    Patient presents today for 3-month follow-up visit on his multiple medical problems. He did have his blood work done last week which I reviewed with him. Everything looked good except his vitamin D level was low and we did him increase his dose. He picked up vitamin D3 at 5000 units and ask him to take that 3 times a week and we will check another vitamin D level in 3 months when I see him. He is also had colonoscopy and EGD since I have seen him last.  I reviewed these also. Tells me blood sugars have been in pretty reasonable range. He does follow with the pharmacy to make adjustments in his medication for this. Blood pressures have been under good range. His CAD is stable and follows with cardiology. Lipids have been stable on a statin medication. His anxiety disorder is under good control on his SSRI. Does not wear his CPAP secondary to claustrophobia. Does continue to vape. Patient never did see dermatology for his atypical nevi that I sent him for nor has he had low-dose CAT scan done yet plans to do both sometime in the near future and I stressed compliance. He continues to follow-up with pharmacy, cardiology, ophthalmology, urology, GI.        Review of Systems     Const: Denies chills, fever. Eyes: Denies eye symptoms States he is up to date with his eyes  ENMT: Denies ear symptoms. Reports postnasal drip, but denies other nasal symptoms. Denies  mouth or throat symptoms. CV: Denies chest pain, orthopnea and palpitations.  Positive for edema lower extremities right greater than left-some improved.  This is side that he had vein harvesting on. Resp: Denieswheezing.  Positive for shortness of breath-occasional with overexertion.  Also admits to occasional cough  GI: Denies diarrhea, nausea and vomiting.   : Urinary: denies dysuria, frequency and frequent UTI's. Musculo: Reports arthritis. Skin: Denies eczema, pruritus and sores. Neuro: Denies dizziness, headache, seizures and syncope. Psych: Anxious-improved on Zoloft         REST OF PERTINENT ROS GONE OVER AND WAS NEGATIVE. PMH:  Problem List: Chronic ulcerative enterocolitis, Hyperlipidemia, Type 2 diabetes mellitus, Benign essential  hypertension  Health Maintenance:  Bone Density Scan - (10/16/2014)  Colonoscopy Screening - (10/20/2014)  Colonoscopy - (2/21/2008)  Colonoscopy - (5/28/2010)  Colonoscopy - (6/29/2012)  Colonoscopy - (10/20/2014)  Influenza Vaccination - (9/11/2015)  Colonoscopy - 2/08,5/10,12/11,6/12,10/14-Dr swartz, 6/20-due 21  Rectal Exam - 5/09,9/10,12/11,7/13-declined  PSA Test - 5/09,9/10,4/12,7/13  Prostate Exam - 5/09,9/10,12/11,7/13-declined  EKG - 5/09,11/13  EGD - Dr. Flor Cruz  Medical Problems:  Ulcerative Colitis - follows with ismael  Hyperlipidemia  chronic right shoulder pain - ac arthritis and rotator cuff tear  olecranon septic bursitis, Hypertension, Type 2 Diabetes  Headache - cluster  microdiscectomy L4L5  Sleep Apnea - uvulectomy- can't wear mask-declined further eval  Osteopenia  microscopic hematuria - Was evaluated by urology. Diverticulosis, Proteinuria, Colon Polyps, poor compliance  CAD - CABG 2017  Barrettes Esophagitis, Fatty Liver, Kidney Stones  Follows with - Cardiology ,pharmacist, ophthalmology, GI, urology  FH:  Father:  Coronary Artery Disease (CAD), Non Insulin Dependent Diabetes. Mother:  CLL, Hypertension, Chronic Obstructive Pulmonary Disease (COPD). Sister 1:  osteopenia. Paternal Uncle 1:  Lung Cancer. SH:  . (Marital)  Personal Habits: Cigarette Use: vapes. Alcohol: Current Alcohol Use                Current Outpatient Medications:     amLODIPine (NORVASC) 10 MG tablet, Take 1 tablet by mouth daily, Disp: 90 tablet, Rfl: 1    atorvastatin (LIPITOR) 40 MG tablet, Take 1 tablet by mouth daily, Disp: 90 tablet, Rfl: 1    fenofibrate (TRICOR) 145 MG tablet, Take 1 tablet by mouth daily, Disp: 90 tablet, Rfl: 1    mesalamine (LIALDA) 1.2 g EC tablet, Take 1 tablet by mouth 3 times daily, Disp: 270 tablet, Rfl: 1    metFORMIN (GLUCOPHAGE) 500 MG tablet, Take 2 tablets by mouth 2 times daily (with meals), Disp: 360 tablet, Rfl: 1    naproxen (NAPROSYN) 500 MG tablet, take 1 tablet by mouth twice a day if needed for pain, Disp: 60 tablet, Rfl: 5    pantoprazole (PROTONIX) 40 MG tablet, Take 1 tablet by mouth daily, Disp: 90 tablet, Rfl: 1    sertraline (ZOLOFT) 50 MG tablet, TAKE 1 TABLET DAILY, Disp: 90 tablet, Rfl: 1    predniSONE (DELTASONE) 2.5 MG tablet, Take 1 tablet by mouth daily, Disp: 90 tablet, Rfl: 1    cyclobenzaprine (FLEXERIL) 5 MG tablet, Take 1 tablet by mouth every 8 hours as needed for Muscle spasms (in the low back), Disp: 90 tablet, Rfl: 1    metoprolol tartrate (LOPRESSOR) 25 MG tablet, Take 1 tablet by mouth 2 times daily, Disp: 180 tablet, Rfl: 1    blood glucose test strips (ONETOUCH ULTRA) strip, TEST 4 TIMES DAILY, Disp: 400 strip, Rfl: 1    insulin lispro, 1 Unit Dial, 100 UNIT/ML SOPN, Adjust units accordingly as needed, Disp: , Rfl:     insulin glargine (LANTUS) 100 UNIT/ML injection vial, Inject into the skin every morning 62 units, Disp: , Rfl:     OneTouch Delica Lancets 21M MISC, 1 Device by Other route 4 times daily, Disp: 200 each, Rfl: 6    vitamin D (ERGOCALCIFEROL) 1.25 MG (75042 UT) CAPS capsule, Take 1 capsule by mouth every 30 days, Disp: 6 capsule, Rfl: 0    vitamin C (ASCORBIC ACID) 500 MG tablet, Take 1 tablet by mouth daily, Disp: 30 tablet, Rfl: 5    Multiple Vitamin (MULTIVITAMIN) tablet, Take 1 tablet by mouth daily Dispense tab-a-cathy if available, Disp: 30 tablet, Rfl: 5    Elastic Bandages & Supports (MEDICAL COMPRESSION STOCKINGS) MISC, 1 each by Does not apply route daily as needed (edema) JOBST STOCKINGS 20-30MG MERCURY, Disp: 1 each, Rfl: 0   empagliflozin (JARDIANCE) 25 MG tablet, Take 25 mg by mouth daily, Disp: , Rfl:     Omega-3 Fatty Acids (FISH OIL) 1000 MG CAPS, Take 3 capsules by mouth daily (Patient taking differently: Take 1,000 mg by mouth daily ), Disp: 90 capsule, Rfl: 6    azaTHIOprine (IMURAN) 50 MG tablet, take 1 tablet by mouth three times a day, Disp: 90 tablet, Rfl: 3    Insulin Pen Needle 31G X 8 MM MISC, For injections of insulin 4 times a day, Disp: 120 each, Rfl: 5    Dulaglutide (TRULICITY) 1.5 QN/5.8KE SOPN, Inject into the skin, Disp: , Rfl:     aspirin 81 MG tablet, Take 81 mg by mouth daily, Disp: , Rfl:     insulin glargine (TOUJEO SOLOSTAR) 300 UNIT/ML injection pen, Inject into the skin once a week , Disp: , Rfl:   Allergies   Allergen Reactions    Lisinopril Swelling     Tongue swells     Ace Inhibitors     Amino Acids        Past Medical History:   Diagnosis Date    Arthritis     Colitis     Colon polyps     Coronary artery disease of native artery of native heart with stable angina pectoris (HCC)     Diabetes mellitus ([facility] [address]     Diverticulosis     Headache     Hyperlipidemia     Hypertension     Osteopenia     Sleep apnea      Past Surgical History:   Procedure Laterality Date    APPENDECTOMY      BACK SURGERY      CARDIAC CATHETERIZATION  03/29/2017    Dr. Homar Delatorre    COLONOSCOPY     [address] GRAFT  04/06/2017    x2    ENDOSCOPY, COLON, DIAGNOSTIC      FRACTURE SURGERY      wrist    FRACTURE SURGERY      left tibia    FRACTURE SURGERY      fingers    FRACTURE SURGERY      collar bone    UVULECTOMY       Family History   Problem Relation Age of Onset    Asthma Mother     Diabetes Father     Heart Disease Father     Coronary Art Dis Father      Social History     Socioeconomic History    Marital status:      Spouse name: Not on file    Number of children: Not on file    Years of education: Not on file    Highest education level: Not on file   Occupational History  Not on file   Tobacco Use    Smoking status: Former Smoker     Packs/day: 1.50     Years: 35.00     Pack years: 52.50     Types: E-Cigarettes, Cigarettes     Quit date: 2015     Years since quittin.5    Smokeless tobacco: Never Used    Tobacco comment: currently uses vapor cigarettes   Substance and Sexual Activity    Alcohol use: Yes     Alcohol/week: 10.0 standard drinks     Types: 10 Cans of beer per week     Comment: occasionally    Drug use: No    Sexual activity: Not on file   Other Topics Concern    Not on file   Social History Narrative    Not on file     Social Determinants of Health     Financial Resource Strain:     Difficulty of Paying Living Expenses:    Food Insecurity:     Worried About Running Out of Food in the Last Year:     [address] in the Last Year:    Transportation Needs:     Lack of Transportation (Medical):  Lack of Transportation (Non-Medical):    Physical Activity:     Days of Exercise per Week:     Minutes of Exercise per Session:    Stress:     Feeling of Stress :    Social Connections:     Frequency of Communication with Friends and Family:     Frequency of Social Gatherings with Friends and Family:     Attends Oriental orthodox Services:     Active Member of Clubs or Organizations:     Attends Club or Organization Meetings:     Marital Status:    Intimate Partner Violence:     Fear of Current or Ex-Partner:     Emotionally Abused:     Physically Abused:     Sexually Abused:        Vitals:    21 0726 21 0754   BP: (!) 136/58 138/64   Pulse: 73    Temp: 97.1 °F (36.2 °C)    TempSrc: Temporal    SpO2: 96%    Weight: 237 lb (107.5 kg)    Height: 6' (1.829 m)        Physical Exam    Constitutional: He is oriented to person, place, and time. He appears well-developed and well-nourished. No distress.   Overweight   Neck: Normal range of motion. Neck supple. No JVD present. No thyromegaly present.    Cardiovascular: Normal rate, regular rhythm, normal heart sounds and intact distal pulses. Exam reveals no gallop and no friction rub.   No murmur heard. Mid sternotomy scar from his CABG   Pulmonary/Chest: Effort normal and breath sounds normal. No respiratory distress. He has no wheezes. He has no rales. Abdominal: Soft. Bowel sounds are normal. He exhibits no distension and no mass. There is no tenderness. Musculoskeletal: Normal range of motion.   Pulses palpable distally lymphadenopathy:     He has no cervical adenopathy. Neurological: He is alert and oriented to person, place, and time. No sensory deficit. He exhibits normal muscle tone. Coordination normal.   Skin: Skin is warm and dry.  Does have several nevi on his back.  I did ask him to see dermatology at least once for an evaluation to make sure none of these need biopsy.  patient never did follow through with this.    psychiatric: He has a normal mood and affect. His behavior is normal.   Nursing note and vitals reviewed.              Assessment and Plan:  Rika Clark was seen today for hypertension. Diagnoses and all orders for this visit:    Essential hypertension  -     amLODIPine (NORVASC) 10 MG tablet; Take 1 tablet by mouth daily    Mixed hyperlipidemia  -     atorvastatin (LIPITOR) 40 MG tablet; Take 1 tablet by mouth daily  -     fenofibrate (TRICOR) 145 MG tablet; Take 1 tablet by mouth daily    Ulcerative colitis without complications, unspecified location (HCC)  -     mesalamine (LIALDA) 1.2 g EC tablet; Take 1 tablet by mouth 3 times daily  -     predniSONE (DELTASONE) 2.5 MG tablet; Take 1 tablet by mouth daily    Diabetes mellitus type 2, insulin dependent (HCC)  -     metFORMIN (GLUCOPHAGE) 500 MG tablet; Take 2 tablets by mouth 2 times daily (with meals)    Gastroesophageal reflux disease without esophagitis  -     pantoprazole (PROTONIX) 40 MG tablet;  Take 1 tablet by mouth daily    Anxiety  -     sertraline (ZOLOFT) 50 MG tablet; TAKE 1 TABLET DAILY    Coronary artery disease of native artery of native heart with stable angina pectoris (HCC)    Vitamin D deficiency    Other orders  -     naproxen (NAPROSYN) 500 MG tablet; take 1 tablet by mouth twice a day if needed for pain      Plan: I will see him back in 3 months and as needed. We will check vitamin D level at that time continue to monitor blood pressure and blood sugar closely. Prescription management performed. Continue weight loss attempts. He is down 6 pounds at this visit. Continue to follow with above consultants. Notify us with problems in the interim. Return in about 3 months (around 10/8/2021). Seen By:  Pili Arriaza MD      *Document was created using voice recognition software. Note was reviewed however may contain grammatical errors.

## 2021-08-18 ENCOUNTER — OFFICE VISIT (OUTPATIENT)
Dept: FAMILY MEDICINE CLINIC | Age: 66
End: 2021-08-18
Payer: MEDICARE

## 2021-08-18 VITALS
SYSTOLIC BLOOD PRESSURE: 146 MMHG | HEIGHT: 72 IN | OXYGEN SATURATION: 99 % | HEART RATE: 67 BPM | BODY MASS INDEX: 32.1 KG/M2 | WEIGHT: 237 LBS | DIASTOLIC BLOOD PRESSURE: 72 MMHG | TEMPERATURE: 97 F

## 2021-08-18 DIAGNOSIS — M85.80 OSTEOPENIA, UNSPECIFIED LOCATION: Primary | ICD-10-CM

## 2021-08-18 PROCEDURE — 4040F PNEUMOC VAC/ADMIN/RCVD: CPT | Performed by: INTERNAL MEDICINE

## 2021-08-18 PROCEDURE — 99213 OFFICE O/P EST LOW 20 MIN: CPT | Performed by: INTERNAL MEDICINE

## 2021-08-18 PROCEDURE — G8417 CALC BMI ABV UP PARAM F/U: HCPCS | Performed by: INTERNAL MEDICINE

## 2021-08-18 PROCEDURE — 3017F COLORECTAL CA SCREEN DOC REV: CPT | Performed by: INTERNAL MEDICINE

## 2021-08-18 PROCEDURE — G8427 DOCREV CUR MEDS BY ELIG CLIN: HCPCS | Performed by: INTERNAL MEDICINE

## 2021-08-18 PROCEDURE — 1036F TOBACCO NON-USER: CPT | Performed by: INTERNAL MEDICINE

## 2021-08-18 PROCEDURE — 1123F ACP DISCUSS/DSCN MKR DOCD: CPT | Performed by: INTERNAL MEDICINE

## 2021-08-18 RX ORDER — ALENDRONATE SODIUM 70 MG/1
70 TABLET ORAL
Qty: 12 TABLET | Refills: 1 | Status: SHIPPED
Start: 2021-08-18 | End: 2022-01-19 | Stop reason: SDUPTHER

## 2021-08-18 NOTE — PROGRESS NOTES
[facility]     21  Negar Zapata : 1955 Sex: male  Age: 77 y.o. Chief Complaint   Patient presents with    Other     discuss possible medication regarding osteopenia       HPI    Patient presents today for additional visit because of recent DEXA scan abnormality. He had a T score of -2.2 with positive FRAX score I did recommend medication. We did discuss options and came to decision of alendronate. Went over all the potential side effects of medication including jaw osteonecrosis and atypical fracture of the femur. He does have Veliz's esophagus but is well controlled and scoped on a routine basis. Review of Systems     Const: Denies chills, fever. Eyes: Denies eye symptoms States he is up to date with his eyes  ENMT: Denies ear symptoms. Reports postnasal drip, but denies other nasal symptoms. Denies  mouth or throat symptoms. CV: Denies chest pain, orthopnea and palpitations.  Positive for edema lower extremities right greater than left-some improved.  This is side that he had vein harvesting on. Resp: Denieswheezing.  Positive for shortness of breath-occasional with overexertion.  Also admits to occasional cough  GI: Denies diarrhea, nausea and vomiting. : Urinary: denies dysuria, frequency and frequent UTI's. Musculo: Reports arthritis. Skin: Denies eczema, pruritus and sores. Neuro: Denies dizziness, headache, seizures and syncope. Psych: Anxious-improved on Zoloft         REST OF PERTINENT ROS GONE OVER AND WAS NEGATIVE.                Current Outpatient Medications:     alendronate (FOSAMAX) 70 MG tablet, Take 1 tablet by mouth every 7 days, Disp: 12 tablet, Rfl: 1    amLODIPine (NORVASC) 10 MG tablet, Take 1 tablet by mouth daily, Disp: 90 tablet, Rfl: 1    atorvastatin (LIPITOR) 40 MG tablet, Take 1 tablet by mouth daily, Disp: 90 tablet, Rfl: 1    fenofibrate (TRICOR) 145 MG tablet, Take 1 tablet by mouth daily, Disp: 90 tablet, Rfl: 1   mesalamine (LIALDA) 1.2 g EC tablet, Take 1 tablet by mouth 3 times daily, Disp: 270 tablet, Rfl: 1    metFORMIN (GLUCOPHAGE) 500 MG tablet, Take 2 tablets by mouth 2 times daily (with meals), Disp: 360 tablet, Rfl: 1    naproxen (NAPROSYN) 500 MG tablet, take 1 tablet by mouth twice a day if needed for pain, Disp: 60 tablet, Rfl: 5    pantoprazole (PROTONIX) 40 MG tablet, Take 1 tablet by mouth daily, Disp: 90 tablet, Rfl: 1    sertraline (ZOLOFT) 50 MG tablet, TAKE 1 TABLET DAILY, Disp: 90 tablet, Rfl: 1    predniSONE (DELTASONE) 2.5 MG tablet, Take 1 tablet by mouth daily, Disp: 90 tablet, Rfl: 1    cyclobenzaprine (FLEXERIL) 5 MG tablet, Take 1 tablet by mouth every 8 hours as needed for Muscle spasms (in the low back), Disp: 90 tablet, Rfl: 1    metoprolol tartrate (LOPRESSOR) 25 MG tablet, Take 1 tablet by mouth 2 times daily, Disp: 180 tablet, Rfl: 1    blood glucose test strips (ONETOUCH ULTRA) strip, TEST 4 TIMES DAILY, Disp: 400 strip, Rfl: 1    insulin lispro, 1 Unit Dial, 100 UNIT/ML SOPN, Adjust units accordingly as needed, Disp: , Rfl:     insulin glargine (LANTUS) 100 UNIT/ML injection vial, Inject into the skin every morning 62 units, Disp: , Rfl:     OneTouch Delica Lancets 67U MISC, 1 Device by Other route 4 times daily, Disp: 200 each, Rfl: 6    vitamin D (ERGOCALCIFEROL) 1.25 MG (48871 UT) CAPS capsule, Take 1 capsule by mouth every 30 days, Disp: 6 capsule, Rfl: 0    vitamin C (ASCORBIC ACID) 500 MG tablet, Take 1 tablet by mouth daily, Disp: 30 tablet, Rfl: 5    Multiple Vitamin (MULTIVITAMIN) tablet, Take 1 tablet by mouth daily Dispense tab-a-cathy if available, Disp: 30 tablet, Rfl: 5    Elastic Bandages & Supports (MEDICAL COMPRESSION STOCKINGS) MISC, 1 each by Does not apply route daily as needed (edema) JOBST STOCKINGS 20-30MG MERCURY, Disp: 1 each, Rfl: 0    empagliflozin (JARDIANCE) 25 MG tablet, Take 25 mg by mouth daily, Disp: , Rfl:     Omega-3 Fatty Acids (FISH OIL) 1000 MG CAPS, Take 3 capsules by mouth daily (Patient taking differently: Take 1,000 mg by mouth daily ), Disp: 90 capsule, Rfl: 6    azaTHIOprine (IMURAN) 50 MG tablet, take 1 tablet by mouth three times a day, Disp: 90 tablet, Rfl: 3    Insulin Pen Needle 31G X 8 MM MISC, For injections of insulin 4 times a day, Disp: 120 each, Rfl: 5    Dulaglutide (TRULICITY) 1.5 PN/2.8KU SOPN, Inject into the skin, Disp: , Rfl:     aspirin 81 MG tablet, Take 81 mg by mouth daily, Disp: , Rfl:     insulin glargine (TOUJEO SOLOSTAR) 300 UNIT/ML injection pen, Inject into the skin once a week , Disp: , Rfl:   Allergies   Allergen Reactions    Lisinopril Swelling     Tongue swells     Ace Inhibitors     Amino Acids        Past Medical History:   Diagnosis Date    Arthritis     Colitis     Colon polyps     Coronary artery disease of native artery of native heart with stable angina pectoris (HCC)     Diabetes mellitus ([facility] [address]     Diverticulosis     Headache     Hyperlipidemia     Hypertension     Osteopenia     Sleep apnea      Past Surgical History:   Procedure Laterality Date    APPENDECTOMY      BACK SURGERY      CARDIAC CATHETERIZATION  03/29/2017    Dr. Franki Seay    COLONOSCOPY     [address] GRAFT  04/06/2017    x2    ENDOSCOPY, COLON, DIAGNOSTIC      FRACTURE SURGERY      wrist    FRACTURE SURGERY      left tibia    FRACTURE SURGERY      fingers    FRACTURE SURGERY      collar bone    UVULECTOMY       Family History   Problem Relation Age of Onset    Asthma Mother     Diabetes Father     Heart Disease Father     Coronary Art Dis Father      Social History     Socioeconomic History    Marital status:      Spouse name: Not on file    Number of children: Not on file    Years of education: Not on file    Highest education level: Not on file   Occupational History    Not on file   Tobacco Use    Smoking status: Former Smoker     Packs/day: 1.50     Years: 35.00     Pack years: 52.50     Types: E-Cigarettes, Cigarettes     Quit date: 2015     Years since quittin.6    Smokeless tobacco: Never Used    Tobacco comment: currently uses vapor cigarettes   Substance and Sexual Activity    Alcohol use: Yes     Alcohol/week: 10.0 standard drinks     Types: 10 Cans of beer per week     Comment: occasionally    Drug use: No    Sexual activity: Not on file   Other Topics Concern    Not on file   Social History Narrative    Not on file     Social Determinants of Health     Financial Resource Strain:     Difficulty of Paying Living Expenses:    Food Insecurity:     Worried About Running Out of Food in the Last Year:     [address] in the Last Year:    Transportation Needs:     Lack of Transportation (Medical):  Lack of Transportation (Non-Medical):    Physical Activity:     Days of Exercise per Week:     Minutes of Exercise per Session:    Stress:     Feeling of Stress :    Social Connections:     Frequency of Communication with Friends and Family:     Frequency of Social Gatherings with Friends and Family:     Attends L[employer] Services:     Active Member of Clubs or Organizations:     Attends Club or Organization Meetings:     Marital Status:    Intimate Partner Violence:     Fear of Current or Ex-Partner:     Emotionally Abused:     Physically Abused:     Sexually Abused:        Vitals:    21 0707   BP: (!) 146/72   Pulse: 67   Temp: 97 °F (36.1 °C)   TempSrc: Temporal   SpO2: 99%   Weight: 237 lb (107.5 kg)   Height: 6' (1.829 m)       Physical Exam              Assessment and Plan:  Francisco Javier Chavez was seen today for other. Diagnoses and all orders for this visit:    Osteopenia, unspecified location    Other orders  -     alendronate (FOSAMAX) 70 MG tablet; Take 1 tablet by mouth every 7 days      Plan: See above body report. And started alendronate. 1 potential side effects. Instruction sheet. Prescription management performed.   Keep follow-up

## 2021-10-06 ENCOUNTER — OFFICE VISIT (OUTPATIENT)
Dept: FAMILY MEDICINE CLINIC | Age: 66
End: 2021-10-06
Payer: MEDICARE

## 2021-10-06 VITALS
HEART RATE: 64 BPM | WEIGHT: 244 LBS | BODY MASS INDEX: 33.09 KG/M2 | OXYGEN SATURATION: 99 % | SYSTOLIC BLOOD PRESSURE: 138 MMHG | DIASTOLIC BLOOD PRESSURE: 78 MMHG

## 2021-10-06 DIAGNOSIS — M85.80 OSTEOPENIA, UNSPECIFIED LOCATION: ICD-10-CM

## 2021-10-06 DIAGNOSIS — Z79.4 DIABETES MELLITUS TYPE 2, INSULIN DEPENDENT (HCC): ICD-10-CM

## 2021-10-06 DIAGNOSIS — I10 ESSENTIAL HYPERTENSION: ICD-10-CM

## 2021-10-06 DIAGNOSIS — E78.2 MIXED HYPERLIPIDEMIA: ICD-10-CM

## 2021-10-06 DIAGNOSIS — I25.118 CORONARY ARTERY DISEASE OF NATIVE ARTERY OF NATIVE HEART WITH STABLE ANGINA PECTORIS (HCC): ICD-10-CM

## 2021-10-06 DIAGNOSIS — E55.9 VITAMIN D DEFICIENCY: ICD-10-CM

## 2021-10-06 DIAGNOSIS — K51.90 ULCERATIVE COLITIS WITHOUT COMPLICATIONS, UNSPECIFIED LOCATION (HCC): ICD-10-CM

## 2021-10-06 DIAGNOSIS — E11.9 DIABETES MELLITUS TYPE 2, INSULIN DEPENDENT (HCC): ICD-10-CM

## 2021-10-06 PROCEDURE — G8484 FLU IMMUNIZE NO ADMIN: HCPCS | Performed by: INTERNAL MEDICINE

## 2021-10-06 PROCEDURE — 3017F COLORECTAL CA SCREEN DOC REV: CPT | Performed by: INTERNAL MEDICINE

## 2021-10-06 PROCEDURE — G8427 DOCREV CUR MEDS BY ELIG CLIN: HCPCS | Performed by: INTERNAL MEDICINE

## 2021-10-06 PROCEDURE — 1123F ACP DISCUSS/DSCN MKR DOCD: CPT | Performed by: INTERNAL MEDICINE

## 2021-10-06 PROCEDURE — 99213 OFFICE O/P EST LOW 20 MIN: CPT | Performed by: INTERNAL MEDICINE

## 2021-10-06 PROCEDURE — 2022F DILAT RTA XM EVC RTNOPTHY: CPT | Performed by: INTERNAL MEDICINE

## 2021-10-06 PROCEDURE — 1036F TOBACCO NON-USER: CPT | Performed by: INTERNAL MEDICINE

## 2021-10-06 PROCEDURE — G8417 CALC BMI ABV UP PARAM F/U: HCPCS | Performed by: INTERNAL MEDICINE

## 2021-10-06 PROCEDURE — 4040F PNEUMOC VAC/ADMIN/RCVD: CPT | Performed by: INTERNAL MEDICINE

## 2021-10-06 PROCEDURE — 3044F HG A1C LEVEL LT 7.0%: CPT | Performed by: INTERNAL MEDICINE

## 2021-10-06 NOTE — PROGRESS NOTES
[facility]     10/6/21  Nancy Nichole : 1955 Sex: male  Age: 77 y.o. Chief Complaint   Patient presents with    3 Month Follow-Up       HPI  Patient presents today for follow-up visit on his medical problems. In general Leonora Baldwin is been doing well. 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 Tells me blood pressures been under good range. Lipids have been stable on statin medication. He is up-to-date with cardiology and has been stable from a CAD standpoint. We did start him on alendronate last time for an elevated FRAX score. He is tolerating this okay. His anxiety disorder is under good control on his SSRI. Does not wear his CPAP secondary to claustrophobia. Does continue to vape. Patient never did see dermatology for his atypical nevi that I sent him for nor has he had low-dose CAT scan done yet plans to do both sometime in the near future and I stressed compliance. He continues to follow-up with pharmacy, cardiology, ophthalmology, urology, GI.   Review of Systems     Const: Denies chills, fever. Eyes: Denies eye symptoms States he is up to date with his eyes  ENMT: Denies ear symptoms. Reports postnasal drip, but denies other nasal symptoms. Denies  mouth or throat symptoms. CV: Denies chest pain, orthopnea and palpitations.  Positive for edema lower extremities right greater than left-some improved.  This is side that he had vein harvesting on. Resp: Denieswheezing.  Positive for shortness of breath-occasional with overexertion.  Also admits to occasional cough  GI: Denies diarrhea, nausea and vomiting. : Urinary: denies dysuria, frequency and frequent UTI's. Musculo: Reports arthritis. Skin: Denies eczema, pruritus and sores. Neuro: Denies dizziness, headache, seizures and syncope. Psych: Anxious-improved on Zoloft            REST OF PERTINENT ROS GONE OVER AND WAS NEGATIVE.      PMH:  Problem List: Chronic ulcerative enterocolitis, Hyperlipidemia, Type 2 diabetes mellitus, Benign essential  hypertension  Health Maintenance:  Bone Density Scan - (10/16/2014), 7/21-positive FRAX score  Colonoscopy Screening - (10/20/2014)  Colonoscopy - (2/21/2008)  Colonoscopy - (5/28/2010)  Colonoscopy - (6/29/2012)  Colonoscopy - (10/20/2014)  Influenza Vaccination - (9/11/2015)  Colonoscopy - 2/08,5/10,12/11,6/12,10/14-Dr swartz, 6/20, 6/21-due 23  Rectal Exam - 5/09,9/10,12/11,7/13-declined  PSA Test - 5/09,9/10,4/12,7/13  Prostate Exam - 5/09,9/10,12/11,7/13-declined  EKG - 5/09,11/13  EGD - Dr. Sydnie Ascencio 24  Medical Problems:  Ulcerative Colitis - follows with ismael  Hyperlipidemia  chronic right shoulder pain - ac arthritis and rotator cuff tear  olecranon septic bursitis, Hypertension, Type 2 Diabetes  Headache - cluster  microdiscectomy L4L5  Sleep Apnea - uvulectomy- can't wear mask-declined further eval  Osteopenia  microscopic hematuria - Was evaluated by urology. Diverticulosis, Proteinuria, Colon Polyps, poor compliance  CAD - CABG 2017  Barrettes Esophagitis, Fatty Liver, Kidney Stones  Follows with - Cardiology ,pharmacist, ophthalmology, GI, urology  FH:  Father:  Coronary Artery Disease (CAD), Non Insulin Dependent Diabetes. Mother:  CLL, Hypertension, Chronic Obstructive Pulmonary Disease (COPD). Sister 1:  osteopenia. Paternal Uncle 1:  Lung Cancer. SH:  . (Marital)  Personal Habits: Cigarette Use: vapes. Alcohol: Current Alcohol Use                Current Outpatient Medications:     metoprolol tartrate (LOPRESSOR) 25 MG tablet, Take 1 tablet by mouth 2 times daily, Disp: 180 tablet, Rfl: 1    alendronate (FOSAMAX) 70 MG tablet, Take 1 tablet by mouth every 7 days, Disp: 12 tablet, Rfl: 1    amLODIPine (NORVASC) 10 MG tablet, Take 1 tablet by mouth daily, Disp: 90 tablet, Rfl: 1    atorvastatin (LIPITOR) 40 MG tablet, Take 1 tablet by mouth daily, Disp: 90 tablet, Rfl: 1    fenofibrate (TRICOR) 145 MG tablet, Take 1 tablet by mouth daily, Disp: 90 tablet, Rfl: 1    mesalamine (LIALDA) 1.2 g EC tablet, Take 1 tablet by mouth 3 times daily, Disp: 270 tablet, Rfl: 1    metFORMIN (GLUCOPHAGE) 500 MG tablet, Take 2 tablets by mouth 2 times daily (with meals), Disp: 360 tablet, Rfl: 1    naproxen (NAPROSYN) 500 MG tablet, take 1 tablet by mouth twice a day if needed for pain, Disp: 60 tablet, Rfl: 5    pantoprazole (PROTONIX) 40 MG tablet, Take 1 tablet by mouth daily, Disp: 90 tablet, Rfl: 1    sertraline (ZOLOFT) 50 MG tablet, TAKE 1 TABLET DAILY, Disp: 90 tablet, Rfl: 1    predniSONE (DELTASONE) 2.5 MG tablet, Take 1 tablet by mouth daily, Disp: 90 tablet, Rfl: 1    insulin glargine (LANTUS) 100 UNIT/ML injection vial, Inject into the skin every morning 62 units, Disp: , Rfl:     vitamin D (ERGOCALCIFEROL) 1.25 MG (64284 UT) CAPS capsule, Take 1 capsule by mouth every 30 days, Disp: 6 capsule, Rfl: 0    vitamin C (ASCORBIC ACID) 500 MG tablet, Take 1 tablet by mouth daily, Disp: 30 tablet, Rfl: 5    Multiple Vitamin (MULTIVITAMIN) tablet, Take 1 tablet by mouth daily Dispense tab-a-cathy if available, Disp: 30 tablet, Rfl: 5    empagliflozin (JARDIANCE) 25 MG tablet, Take 25 mg by mouth daily, Disp: , Rfl:     Omega-3 Fatty Acids (FISH OIL) 1000 MG CAPS, Take 3 capsules by mouth daily (Patient taking differently: Take 1,000 mg by mouth daily ), Disp: 90 capsule, Rfl: 6    azaTHIOprine (IMURAN) 50 MG tablet, take 1 tablet by mouth three times a day, Disp: 90 tablet, Rfl: 3    Dulaglutide (TRULICITY) 1.5 RS/3.3SL SOPN, Inject into the skin, Disp: , Rfl:     aspirin 81 MG tablet, Take 81 mg by mouth daily, Disp: , Rfl:     insulin glargine (TOUJEO SOLOSTAR) 300 UNIT/ML injection pen, Inject into the skin once a week , Disp: , Rfl:     cyclobenzaprine (FLEXERIL) 5 MG tablet, Take 1 tablet by mouth every 8 hours as needed for Muscle spasms (in the low back), Disp: 90 tablet, Rfl: 1   blood glucose test strips (ONETOUCH ULTRA) strip, TEST 4 TIMES DAILY, Disp: 400 strip, Rfl: 1    insulin lispro, 1 Unit Dial, 100 UNIT/ML SOPN, Adjust units accordingly as needed, Disp: , Rfl:     OneTouch Delica Lancets 81A MISC, 1 Device by Other route 4 times daily, Disp: 200 each, Rfl: 6    Elastic Bandages & Supports ([address]) MISC, 1 each by Does not apply route daily as needed (edema) JOBST STOCKINGS 20-30MG MERCURY, Disp: 1 each, Rfl: 0    Insulin Pen Needle 31G X 8 MM MISC, For injections of insulin 4 times a day, Disp: 120 each, Rfl: 5  Allergies   Allergen Reactions    Lisinopril Swelling     Tongue swells     Ace Inhibitors     Amino Acids        Past Medical History:   Diagnosis Date    Arthritis     Colitis     Colon polyps     Coronary artery disease of native artery of native heart with stable angina pectoris (HCC)     Diabetes mellitus ([facility] [address]     Diverticulosis     Headache     Hyperlipidemia     Hypertension     Osteopenia     Sleep apnea      Past Surgical History:   Procedure Laterality Date    APPENDECTOMY      BACK SURGERY      CARDIAC CATHETERIZATION  03/29/2017    Dr. Chandler Barrientos    COLONOSCOPY     [address] GRAFT  04/06/2017    x2    ENDOSCOPY, COLON, DIAGNOSTIC      FRACTURE SURGERY      wrist    FRACTURE SURGERY      left tibia    FRACTURE SURGERY      fingers    FRACTURE SURGERY      collar bone    UVULECTOMY       Family History   Problem Relation Age of Onset    Asthma Mother     Diabetes Father     Heart Disease Father     Coronary Art Dis Father      Social History     Socioeconomic History    Marital status:      Spouse name: Not on file    Number of children: Not on file    Years of education: Not on file    Highest education level: Not on file   Occupational History    Not on file   Tobacco Use    Smoking status: Former Smoker     Packs/day: 1.50     Years: 35.00     Pack years: 52.50     Types: E-Cigarettes, Cigarettes     Quit date: 2015     Years since quittin.7    Smokeless tobacco: Never Used    Tobacco comment: currently uses vapor cigarettes   Substance and Sexual Activity    Alcohol use: Yes     Alcohol/week: 10.0 standard drinks     Types: 10 Cans of beer per week     Comment: occasionally    Drug use: No    Sexual activity: Not on file   Other Topics Concern    Not on file   Social History Narrative    Not on file     Social Determinants of Health     Financial Resource Strain:     Difficulty of Paying Living Expenses:    Food Insecurity:     Worried About Running Out of Food in the Last Year:     [address] in the Last Year:    Transportation Needs:     Lack of Transportation (Medical):  Lack of Transportation (Non-Medical):    Physical Activity:     Days of Exercise per Week:     Minutes of Exercise per Session:    Stress:     Feeling of Stress :    Social Connections:     Frequency of Communication with Friends and Family:     Frequency of Social Gatherings with Friends and Family:     Attends Yazdanism Services:     Active Member of Clubs or Organizations:     Attends Club or Organization Meetings:     Marital Status:    Intimate Partner Violence:     Fear of Current or Ex-Partner:     Emotionally Abused:     Physically Abused:     Sexually Abused:        Vitals:    10/06/21 0651   BP: 138/78   Pulse: 64   SpO2: 99%   Weight: 244 lb (110.7 kg)       Physical Exam    Constitutional: He is oriented to person, place, and time. He appears well-developed and well-nourished. No distress.   Overweight   Neck: Normal range of motion. Neck supple. No JVD present. No thyromegaly present. Cardiovascular: Normal rate, regular rhythm, normal heart sounds and intact distal pulses. Exam reveals no gallop and no friction rub.   No murmur heard. Mid sternotomy scar from his CABG   Pulmonary/Chest: Effort normal and breath sounds normal. No respiratory distress.  He has no wheezes. He has no rales. Abdominal: Soft. Bowel sounds are normal. He exhibits no distension and no mass. There is no tenderness. Musculoskeletal: Normal range of motion.   Pulses palpable distally lymphadenopathy:     He has no cervical adenopathy. Neurological: He is alert and oriented to person, place, and time. No sensory deficit. He exhibits normal muscle tone. Coordination normal.   Skin: Skin is warm and dry.  Does have several nevi on his back.  I did ask him to see dermatology at least once for an evaluation to make sure none of these need biopsy.  patient never did follow through with this.    psychiatric: He has a normal mood and affect. His behavior is normal.   Nursing note and vitals reviewed.             Assessment and Plan:  Hermelinda Ocampo was seen today for 3 month follow-up. Diagnoses and all orders for this visit:    Coronary artery disease of native artery of native heart with stable angina pectoris (HCC)  -     metoprolol tartrate (LOPRESSOR) 25 MG tablet; Take 1 tablet by mouth 2 times daily    Essential hypertension  -     Cancel: Comprehensive Metabolic Panel; Future  -     Cancel: CBC Auto Differential; Future  -     CBC Auto Differential; Future  -     Comprehensive Metabolic Panel; Future    Mixed hyperlipidemia  -     Cancel: Lipid Panel; Future  -     Lipid Panel; Future    Diabetes mellitus type 2, insulin dependent (HCC)    Osteopenia, unspecified location    Ulcerative colitis without complications, unspecified location ([facility]    Vitamin D deficiency  -     Cancel: Vitamin D 25 Hydroxy; Future  -     Vitamin D 25 Hydroxy; Future    Plan: We will see him back in 4 months and as needed. I did put blood work order in to be done in 3 months. Follow with above consultants. Prescription management performed. Notify us of problems in the interim. Return in about 4 months (around 2/6/2022). Seen By:  Callie Guadrado MD      *Document was created using voice recognition software. Note was reviewed however may contain grammatical errors.

## 2021-12-19 DIAGNOSIS — K51.90 ULCERATIVE COLITIS WITHOUT COMPLICATIONS, UNSPECIFIED LOCATION (HCC): ICD-10-CM

## 2021-12-20 RX ORDER — MESALAMINE 1.2 G/1
1.2 TABLET, DELAYED RELEASE ORAL 3 TIMES DAILY
Qty: 270 TABLET | Refills: 0 | Status: SHIPPED
Start: 2021-12-20 | End: 2022-02-10 | Stop reason: SDUPTHER

## 2021-12-20 NOTE — TELEPHONE ENCOUNTER
Last Appointment:  10/6/2021  Future Appointments   Date Time Provider Yogi Madrigal   2/9/2022  7:15 AM Ayala Santo  [address]

## 2021-12-30 ENCOUNTER — TELEPHONE (OUTPATIENT)
Dept: FAMILY MEDICINE CLINIC | Age: 66
End: 2021-12-30

## 2021-12-30 LAB
25-HYDROXY VITAMIN D-3: 34.7 NG/ML (ref 30–100)
A/G RATIO: 1.3 RATIO (ref 1.1–2.2)
ALBUMIN SERPL-MCNC: 4 G/DL (ref 3.4–4.8)
ALP BLD-CCNC: 45 U/L (ref 42–121)
ALT SERPL-CCNC: 34 U/L (ref 10–63)
ANION GAP SERPL CALCULATED.3IONS-SCNC: 11 MEQ/L (ref 3–11)
AST SERPL-CCNC: 30 U/L (ref 10–41)
BASOPHILS ABSOLUTE: 0 K/UL (ref 0–0.2)
BASOPHILS RELATIVE PERCENT: 0.4 % (ref 0–1.5)
BILIRUB SERPL-MCNC: 0.8 MG/DL (ref 0.3–1.5)
BUN BLDV-MCNC: 19 MG/DL (ref 6–20)
CALCIUM SERPL-MCNC: 9.4 MG/DL (ref 8.5–10.5)
CHLORIDE BLD-SCNC: 102 MEQ/L (ref 98–107)
CHOLESTEROL: 131 MG/DL (ref 140–200)
CO2: 25 MEQ/L (ref 21–31)
CREAT SERPL-MCNC: 1.1 MG/DL (ref 0.6–1.3)
CREATININE + EGFR PANEL: 81 ML/MIN
EOSINOPHILS ABSOLUTE: 0.5 K/UL (ref 0–0.33)
EOSINOPHILS RELATIVE PERCENT: 5.8 % (ref 0–3)
GFR NON-AFRICAN AMERICAN: 67 ML/MIN
GLOBULIN: 3.2 G/DL (ref 1.9–3.9)
GLUCOSE BLD-MCNC: 59 MG/DL (ref 70–99)
HCT VFR BLD CALC: 49.5 % (ref 41–50)
HDLC SERPL-MCNC: 48 MG/DL (ref 29–71)
HEMOGLOBIN: 16.3 G/DL (ref 13.5–16.5)
LDL CHOLESTEROL: 63 MG/DL
LDL/HDL RATIO: 1.3 RATIO
LYMPHOCYTES ABSOLUTE: 2 K/UL (ref 1.1–4.8)
LYMPHOCYTES RELATIVE PERCENT: 21.8 % (ref 24–44)
MCH RBC QN AUTO: 30.6 PG (ref 28–34)
MCHC RBC AUTO-ENTMCNC: 32.9 G/DL (ref 33–37)
MCV RBC AUTO: 93.1 FL (ref 80–100)
MONOCYTES ABSOLUTE: 1 K/UL (ref 0.2–0.7)
MONOCYTES RELATIVE PERCENT: 11.3 % (ref 3.4–9)
NEUTROPHILS ABSOLUTE: 5.6 K/UL (ref 1.83–8.7)
PDW BLD-RTO: 16.3 % (ref 10.9–14.3)
PLATELET # BLD: 352 K/UL (ref 150–450)
PMV BLD AUTO: 8.5 FL (ref 7.4–10.4)
POTASSIUM SERPL-SCNC: 4 MEQ/L (ref 3.6–5)
RBC # BLD: 5.31 M/UL (ref 4.5–5.5)
SEGMENTED NEUTROPHILS RELATIVE PERCENT: 60.7 % (ref 40–74)
SODIUM BLD-SCNC: 138 MEQ/L (ref 135–145)
TOTAL PROTEIN: 7.2 G/DL (ref 5.9–7.8)
TRIGL SERPL-MCNC: 108 MG/DL (ref 41–189)
WBC # BLD: 9.2 K/UL (ref 4.5–11)

## 2022-01-03 NOTE — TELEPHONE ENCOUNTER
Discharge Instructions    Discharged to home by car with relative. Discharged via wheelchair, hospital escort: Yes.  Special equipment needed: Not Applicable    Be sure to schedule a follow-up appointment with your primary care doctor or any specialists as instructed.     Discharge Plan:   Diet Plan: Discussed  Activity Level: Discussed  Confirmed Follow up Appointment: Patient to Call and Schedule Appointment  Confirmed Symptoms Management: Discussed  Medication Reconciliation Updated: Yes  Influenza Vaccine Indication: Patient Refuses    I understand that a diet low in cholesterol, fat, and sodium is recommended for good health. Unless I have been given specific instructions below for another diet, I accept this instruction as my diet prescription.   Other diet: regular    Special Instructions: None    · Is patient discharged on Warfarin / Coumadin?   No       Laparoscopic Colectomy  Laparoscopic colectomy is surgery to remove part or all of the large intestine (colon). This procedure may be used to treat several conditions, including:  · Inflammation and infection of the colon (diverticulitis).  · Tumors or masses in the colon.  · Inflammatory bowel disease, such as Crohn disease or ulcerative colitis. Colectomy is an option when symptoms cannot be controlled with medicines.  · Bleeding from the colon that cannot be controlled by another method.  · Blockage or obstruction of the colon.  Tell a health care provider about:  · Any allergies you have.  · All medicines you are taking, including vitamins, herbs, eye drops, creams, and over-the-counter medicines.  · Any problems you or family members have had with anesthetic medicines.  · Any blood disorders you have.  · Any surgeries you have had.  · Any medical conditions you have.  What are the risks?  Generally, this is a safe procedure. However, problems may occur, including:  · Infection.  · Bleeding.  · Allergic reactions to medicines or dyes.  · Damage to other  Left message for patient to return call. structures or organs.  · Leaking from where the colon was sewn together.  · Future blockage of the small intestines from scar tissue. Another surgery may be needed to repair this.  · Needing to convert to an open procedure. Complications such as damage to other organs or excessive bleeding may require the surgeon to convert from a laparoscopic procedure to an open procedure. This involves making a larger incision in the abdomen.  What happens before the procedure?  Staying hydrated   Follow instructions from your health care provider about hydration, which may include:  · Up to 2 hours before the procedure - you may continue to drink clear liquids, such as water, clear fruit juice, black coffee, and plain tea.  Eating and drinking restrictions   Follow instructions from your health care provider about eating and drinking, which may include:  · 8 hours before the procedure - stop eating heavy meals, meals with high fiber, or foods such as meat, fried foods, or fatty foods.  · 6 hours before the procedure - stop eating light meals or foods, such as toast or cereal.  · 6 hours before the procedure - stop drinking milk or drinks that contain milk.  · 2 hours before the procedure - stop drinking clear liquids.  Medicines  · Ask your health care provider about:  ¨ Changing or stopping your regular medicines. This is especially important if you are taking diabetes medicines or blood thinners.  ¨ Taking medicines such as aspirin and ibuprofen. These medicines can thin your blood. Do not take these medicines before your procedure if your health care provider instructs you not to.  · You may be given antibiotic medicine to clean out bacteria from your colon. Follow the directions carefully and take the medicine at the correct time.  General instructions  · You may be prescribed an oral bowel prep to clean out your colon in preparation for the surgery:  ¨ Follow instructions from your health care provider about how to do  this.  ¨ Do not eat or drink anything else after you have started the bowel prep, unless your health care provider tells you it is safe to do so.  · Do not use any products that contain nicotine or tobacco, such as cigarettes and e-cigarettes. If you need help quitting, ask your health care provider.  What happens during the procedure?  · To reduce your risk of infection:  ¨ Your health care team will wash or sanitize their hands.  ¨ Your skin will be washed with soap.  · An IV tube will be inserted into one of your veins to deliver fluid and medication.  · You will be given one of the following:  ¨ A medicine to help you relax (sedative).  ¨ A medicine to make you fall asleep (general anesthetic).  · Small monitors will be connected to your body. They will be used to check your heart, blood pressure, and oxygen level.  · A breathing tube may be placed into your lungs during the procedure.  · A thin, flexible tube (catheter) will be placed into your bladder to drain urine.  · A tube may be placed through your nose and into your stomach to drain stomach fluids (nasogastric tube, or NG tube).  · Your abdomen will be filled with air so it expands. This gives the surgeon more room to operate and makes your organs easier to see.  · Several small cuts (incisions) will be made in your abdomen.  · A thin, lighted tube with a tiny camera on the end (laparoscope) will be put through one of the small incisions. The camera on the laparoscope will send a picture to a computer screen in the operating room. This will give the surgeon a good view inside your abdomen.  · Hollow tubes will be put through the other small incisions in your abdomen. The tools that are needed for the procedure will be put through these tubes.  · Clamps or staples will be put on both ends of the diseased part of the colon.  · The part of the intestine between the clamps or staples will be removed.  · If possible, the ends of the healthy colon that remain  will be stitched (sutured) or stapled together to allow your body to pass waste (stool).  · Sometimes, the remaining colon cannot be stitched back together. If this is the case, a colostomy will be needed. If you need a colostomy:  ¨ An opening to the outside of your body (stoma) will be made through your abdomen.  ¨ The end of your colon will be brought to the opening. It will be stitched to the skin.  ¨ A bag will be attached to the opening. Stool will drain into this removable bag.  ¨ The colostomy may be temporary or permanent.  · The incisions from the colectomy will be closed with sutures or staples.  The procedure may vary among health care providers and hospitals.  What happens after the procedure?  · Your blood pressure, heart rate, breathing rate, and blood oxygen level will be monitored until the medicines you were given have worn off.  · You will receive fluids through an IV tube until your bowels start to work properly.  · Once your bowels are working again, you will be given clear liquids first and then solid food as tolerated.  · You will be given medicines to control your pain and nausea, if needed.  · Do not drive for 24 hours if you were given a sedative.  This information is not intended to replace advice given to you by your health care provider. Make sure you discuss any questions you have with your health care provider.  Document Released: 03/09/2004 Document Revised: 09/18/2017 Document Reviewed: 09/18/2017  [employer] Interactive Patient Education © 2017 [employer]      Depression / Suicide Risk    As you are discharged from this [facility] facility, it is important to learn how to keep safe from harming yourself.    Recognize the warning signs:  · Abrupt changes in personality, positive or negative- including increase in energy   · Giving away possessions  · Change in eating patterns- significant weight changes-  positive or negative  · Change in sleeping patterns- unable to sleep or  sleeping all the time   · Unwillingness or inability to communicate  · Depression  · Unusual sadness, discouragement and loneliness  · Talk of wanting to die  · Neglect of personal appearance   · Rebelliousness- reckless behavior  · Withdrawal from people/activities they love  · Confusion- inability to concentrate     If you or a loved one observes any of these behaviors or has concerns about self-harm, here's what you can do:  · Talk about it- your feelings and reasons for harming yourself  · Remove any means that you might use to hurt yourself (examples: pills, rope, extension cords, firearm)  · Get professional help from the community (Mental Health, Substance Abuse, psychological counseling)  · Do not be alone:Call your Safe Contact- someone whom you trust who will be there for you.  · Call your local CRISIS HOTLINE 739-3322 or 419-701-9232  · Call your local Children's Mobile Crisis Response Team [place] (574) 050-1789 or www.GHash.IO  · Call the toll free National Suicide Prevention Hotlines   · National Suicide Prevention Lifeline 340-032-PXLH (7769)  · National Hope Line Network 800-SUICIDE (081-9422)

## 2022-01-05 NOTE — TELEPHONE ENCOUNTER
Called patient and notified him that his labs came back okay. Patient verbalized understanding and thanked me for calling.

## 2022-01-19 DIAGNOSIS — M62.830 SPASM OF MUSCLE OF LOWER BACK: ICD-10-CM

## 2022-01-19 DIAGNOSIS — K51.90 ULCERATIVE COLITIS WITHOUT COMPLICATIONS, UNSPECIFIED LOCATION (HCC): ICD-10-CM

## 2022-01-19 DIAGNOSIS — K21.9 GASTROESOPHAGEAL REFLUX DISEASE WITHOUT ESOPHAGITIS: ICD-10-CM

## 2022-01-19 DIAGNOSIS — I10 ESSENTIAL HYPERTENSION: ICD-10-CM

## 2022-01-19 RX ORDER — ALENDRONATE SODIUM 70 MG/1
70 TABLET ORAL
Qty: 12 TABLET | Refills: 1 | Status: SHIPPED
Start: 2022-01-19 | End: 2022-06-16 | Stop reason: SDUPTHER

## 2022-01-19 RX ORDER — PANTOPRAZOLE SODIUM 40 MG/1
40 TABLET, DELAYED RELEASE ORAL DAILY
Qty: 90 TABLET | Refills: 1 | Status: SHIPPED
Start: 2022-01-19 | End: 2022-06-16 | Stop reason: SDUPTHER

## 2022-01-19 RX ORDER — PREDNISONE 2.5 MG
2.5 TABLET ORAL DAILY
Qty: 90 TABLET | Refills: 1 | Status: SHIPPED
Start: 2022-01-19 | End: 2022-06-16 | Stop reason: SDUPTHER

## 2022-01-19 RX ORDER — CYCLOBENZAPRINE HCL 5 MG
5 TABLET ORAL EVERY 8 HOURS PRN
Qty: 90 TABLET | Refills: 1 | Status: SHIPPED
Start: 2022-01-19 | End: 2022-06-16 | Stop reason: SDUPTHER

## 2022-01-19 RX ORDER — AMLODIPINE BESYLATE 10 MG/1
10 TABLET ORAL DAILY
Qty: 90 TABLET | Refills: 1 | Status: SHIPPED
Start: 2022-01-19 | End: 2022-06-16 | Stop reason: SDUPTHER

## 2022-01-19 NOTE — TELEPHONE ENCOUNTER
Last Appointment:  10/6/2021  Future Appointments   Date Time Provider Yogi Madrigal   2/9/2022  7:15 AM Ike Jones  [address]

## 2022-02-10 ENCOUNTER — OFFICE VISIT (OUTPATIENT)
Dept: FAMILY MEDICINE CLINIC | Age: 67
End: 2022-02-10
Payer: MEDICARE

## 2022-02-10 VITALS
TEMPERATURE: 97.3 F | SYSTOLIC BLOOD PRESSURE: 132 MMHG | HEART RATE: 84 BPM | BODY MASS INDEX: 32.68 KG/M2 | WEIGHT: 241.3 LBS | DIASTOLIC BLOOD PRESSURE: 58 MMHG | OXYGEN SATURATION: 96 % | HEIGHT: 72 IN

## 2022-02-10 DIAGNOSIS — I25.118 CORONARY ARTERY DISEASE OF NATIVE ARTERY OF NATIVE HEART WITH STABLE ANGINA PECTORIS (HCC): ICD-10-CM

## 2022-02-10 DIAGNOSIS — Z79.4 DIABETES MELLITUS TYPE 2, INSULIN DEPENDENT (HCC): ICD-10-CM

## 2022-02-10 DIAGNOSIS — K21.9 GASTROESOPHAGEAL REFLUX DISEASE WITHOUT ESOPHAGITIS: ICD-10-CM

## 2022-02-10 DIAGNOSIS — M85.80 OSTEOPENIA, UNSPECIFIED LOCATION: ICD-10-CM

## 2022-02-10 DIAGNOSIS — F41.9 ANXIETY: ICD-10-CM

## 2022-02-10 DIAGNOSIS — I10 ESSENTIAL HYPERTENSION: ICD-10-CM

## 2022-02-10 DIAGNOSIS — E55.9 VITAMIN D DEFICIENCY: ICD-10-CM

## 2022-02-10 DIAGNOSIS — K51.90 ULCERATIVE COLITIS WITHOUT COMPLICATIONS, UNSPECIFIED LOCATION (HCC): ICD-10-CM

## 2022-02-10 DIAGNOSIS — E78.2 MIXED HYPERLIPIDEMIA: ICD-10-CM

## 2022-02-10 DIAGNOSIS — E11.9 DIABETES MELLITUS TYPE 2, INSULIN DEPENDENT (HCC): ICD-10-CM

## 2022-02-10 PROCEDURE — 3046F HEMOGLOBIN A1C LEVEL >9.0%: CPT | Performed by: INTERNAL MEDICINE

## 2022-02-10 PROCEDURE — 1036F TOBACCO NON-USER: CPT | Performed by: INTERNAL MEDICINE

## 2022-02-10 PROCEDURE — 4040F PNEUMOC VAC/ADMIN/RCVD: CPT | Performed by: INTERNAL MEDICINE

## 2022-02-10 PROCEDURE — 3288F FALL RISK ASSESSMENT DOCD: CPT | Performed by: INTERNAL MEDICINE

## 2022-02-10 PROCEDURE — G8427 DOCREV CUR MEDS BY ELIG CLIN: HCPCS | Performed by: INTERNAL MEDICINE

## 2022-02-10 PROCEDURE — 2022F DILAT RTA XM EVC RTNOPTHY: CPT | Performed by: INTERNAL MEDICINE

## 2022-02-10 PROCEDURE — G8484 FLU IMMUNIZE NO ADMIN: HCPCS | Performed by: INTERNAL MEDICINE

## 2022-02-10 PROCEDURE — 1123F ACP DISCUSS/DSCN MKR DOCD: CPT | Performed by: INTERNAL MEDICINE

## 2022-02-10 PROCEDURE — 99214 OFFICE O/P EST MOD 30 MIN: CPT | Performed by: INTERNAL MEDICINE

## 2022-02-10 PROCEDURE — 3017F COLORECTAL CA SCREEN DOC REV: CPT | Performed by: INTERNAL MEDICINE

## 2022-02-10 PROCEDURE — G8417 CALC BMI ABV UP PARAM F/U: HCPCS | Performed by: INTERNAL MEDICINE

## 2022-02-10 RX ORDER — ATORVASTATIN CALCIUM 40 MG/1
40 TABLET, FILM COATED ORAL DAILY
Qty: 90 TABLET | Refills: 1 | Status: SHIPPED
Start: 2022-02-10 | End: 2022-06-16 | Stop reason: SDUPTHER

## 2022-02-10 RX ORDER — MESALAMINE 1.2 G/1
1.2 TABLET, DELAYED RELEASE ORAL 3 TIMES DAILY
Qty: 270 TABLET | Refills: 1 | Status: SHIPPED
Start: 2022-02-10 | End: 2022-06-16 | Stop reason: SDUPTHER

## 2022-02-10 RX ORDER — FENOFIBRATE 145 MG/1
145 TABLET, COATED ORAL DAILY
Qty: 90 TABLET | Refills: 1 | Status: SHIPPED
Start: 2022-02-10 | End: 2022-06-16 | Stop reason: SDUPTHER

## 2022-02-10 SDOH — ECONOMIC STABILITY: FOOD INSECURITY: WITHIN THE PAST 12 MONTHS, YOU WORRIED THAT YOUR FOOD WOULD RUN OUT BEFORE YOU GOT MONEY TO BUY MORE.: NEVER TRUE

## 2022-02-10 SDOH — ECONOMIC STABILITY: FOOD INSECURITY: WITHIN THE PAST 12 MONTHS, THE FOOD YOU BOUGHT JUST DIDN'T LAST AND YOU DIDN'T HAVE MONEY TO GET MORE.: NEVER TRUE

## 2022-02-10 ASSESSMENT — PATIENT HEALTH QUESTIONNAIRE - PHQ9
1. LITTLE INTEREST OR PLEASURE IN DOING THINGS: 0
SUM OF ALL RESPONSES TO PHQ QUESTIONS 1-9: 0
2. FEELING DOWN, DEPRESSED OR HOPELESS: 0
SUM OF ALL RESPONSES TO PHQ QUESTIONS 1-9: 0
SUM OF ALL RESPONSES TO PHQ9 QUESTIONS 1 & 2: 0
SUM OF ALL RESPONSES TO PHQ QUESTIONS 1-9: 0
SUM OF ALL RESPONSES TO PHQ QUESTIONS 1-9: 0

## 2022-02-10 ASSESSMENT — SOCIAL DETERMINANTS OF HEALTH (SDOH): HOW HARD IS IT FOR YOU TO PAY FOR THE VERY BASICS LIKE FOOD, HOUSING, MEDICAL CARE, AND HEATING?: NOT HARD AT ALL

## 2022-02-10 NOTE — PROGRESS NOTES
[id number] [facility] [address]     2/10/22  Vita Lee : 1955 Sex: male  Age: 77 y.o. Chief Complaint   Patient presents with    Hypertension     4 months    Coronary Artery Disease       HPI  Patient presents today for follow-up visit on his medical problems. Reviewed last note with Eloisa Patrick. Since have seen her he is seen his pharmacist who is managing his diabetes. Prescription for Jardiance through an endocrinologist but he does not follow-up with him. Last hemoglobin A1c was 6.1. This was about a month and a half ago. Blood pressures been in a good range he tells me. Lipids have been stable on statin medication. We did start him on bisphosphonate last time for elevated FRAX score/osteopenia and he is tolerating this also. His ulcerative colitis has been stable on his current medications. CT has been stable and follows with cardiology. Anxiety is under good control on his SSRI. Does not wear his CPAP secondary to claustrophobia. He does continue to vape. Patient never did see dermatology for his atypical nevi that I sent him for nor has he had low-dose CAT scan done yet. I told him this is his responsibility at this point. To notify me if there are issues with scheduling. He continues to follow-up with pharmacy, cardiology, ophthalmology, urology, GI. Review of Systems     Const: Denies chills, fever. Eyes: Denies eye symptoms States he is up to date with his eyes  ENMT: Denies ear symptoms. Reports postnasal drip, but denies other nasal symptoms. Denies  mouth or throat symptoms. CV: Denies chest pain, orthopnea and palpitations.  Positive for edema lower extremities right greater than left-some improved.  This is side that he had vein harvesting on. Resp: Denies wheezing, cough.  Positive for shortness of breath-occasional with overexertion.   GI: Denies diarrhea, nausea and vomiting. : Urinary: denies dysuria, frequency and frequent UTI's. Musculo: Reports arthritis.   Skin: Denies eczema, pruritus and sores. Neuro: Denies dizziness, headache, seizures and syncope. Psych: Anxious-improved on Zoloft         REST OF PERTINENT ROS GONE OVER AND WAS NEGATIVE. PMH:  Problem List: Chronic ulcerative enterocolitis, Hyperlipidemia, Type 2 diabetes mellitus, Benign essential  hypertension  Health Maintenance:  Bone Density Scan - (10/16/2014), 7/21-positive FRAX score  Colonoscopy Screening - (10/20/2014)  Colonoscopy - (2/21/2008)  Colonoscopy - (5/28/2010)  Colonoscopy - (6/29/2012)  Colonoscopy - (10/20/2014)  Influenza Vaccination - (9/11/2015)  Colonoscopy - 2/08,5/10,12/11,6/12,10/14-Dr swartz, 6/20, 6/21-due 23  Rectal Exam - 5/09,9/10,12/11,7/13-declined  PSA Test - 5/09,9/10,4/12,7/13  Prostate Exam - 5/09,9/10,12/11,7/13-declined  EKG - 5/09,11/13  EGD - Dr. Julián Hannah 24  Medical Problems:  Ulcerative Colitis - follows with ismael  Hyperlipidemia  chronic right shoulder pain - ac arthritis and rotator cuff tear  olecranon septic bursitis, Hypertension, Type 2 Diabetes  Headache - cluster  microdiscectomy L4L5  Sleep Apnea - uvulectomy- can't wear mask-declined further eval  Osteopenia  microscopic hematuria - Was evaluated by urology. Diverticulosis, Proteinuria, Colon Polyps, poor compliance  CAD - CABG 2017  Barrettes Esophagitis, Fatty Liver, Kidney Stones  Follows with - Cardiology ,pharmacist, ophthalmology, GI, urology  FH:  Father:  Coronary Artery Disease (CAD), Non Insulin Dependent Diabetes. Mother:  CLL, Hypertension, Chronic Obstructive Pulmonary Disease (COPD). Sister 1:  osteopenia. Paternal Uncle 1:  Lung Cancer. SH:  . (Marital)  Personal Habits: Cigarette Use: vapes. Alcohol: Current Alcohol Use                   Current Outpatient Medications:     atorvastatin (LIPITOR) 40 MG tablet, Take 1 tablet by mouth daily, Disp: 90 tablet, Rfl: 1    fenofibrate (TRICOR) 145 MG tablet, Take 1 tablet by mouth daily, Disp: 90 tablet, Rfl: 1    metFORMIN (GLUCOPHAGE) 500 MG tablet, Take 2 tablets by mouth 2 times daily (with meals), Disp: 360 tablet, Rfl: 1    sertraline (ZOLOFT) 50 MG tablet, TAKE 1 TABLET DAILY, Disp: 90 tablet, Rfl: 1    mesalamine (LIALDA) 1.2 g EC tablet, Take 1 tablet by mouth 3 times daily, Disp: 270 tablet, Rfl: 1    metoprolol tartrate (LOPRESSOR) 25 MG tablet, Take 1 tablet by mouth 2 times daily, Disp: 180 tablet, Rfl: 1    cyclobenzaprine (FLEXERIL) 5 MG tablet, Take 1 tablet by mouth every 8 hours as needed for Muscle spasms (in the low back), Disp: 90 tablet, Rfl: 1    amLODIPine (NORVASC) 10 MG tablet, Take 1 tablet by mouth daily, Disp: 90 tablet, Rfl: 1    pantoprazole (PROTONIX) 40 MG tablet, Take 1 tablet by mouth daily, Disp: 90 tablet, Rfl: 1    predniSONE (DELTASONE) 2.5 MG tablet, Take 1 tablet by mouth daily, Disp: 90 tablet, Rfl: 1    alendronate (FOSAMAX) 70 MG tablet, Take 1 tablet by mouth every 7 days, Disp: 12 tablet, Rfl: 1    naproxen (NAPROSYN) 500 MG tablet, take 1 tablet by mouth twice a day if needed for pain, Disp: 60 tablet, Rfl: 5    blood glucose test strips (ONETOUCH ULTRA) strip, TEST 4 TIMES DAILY, Disp: 400 strip, Rfl: 1    insulin lispro, 1 Unit Dial, 100 UNIT/ML SOPN, Adjust units accordingly as needed, Disp: , Rfl:     insulin glargine (LANTUS) 100 UNIT/ML injection vial, Inject into the skin every morning 62 units, Disp: , Rfl:     OneTouch Delica Lancets 32N MISC, 1 Device by Other route 4 times daily, Disp: 200 each, Rfl: 6    vitamin D (ERGOCALCIFEROL) 1.25 MG (32336 UT) CAPS capsule, Take 1 capsule by mouth every 30 days, Disp: 6 capsule, Rfl: 0    vitamin C (ASCORBIC ACID) 500 MG tablet, Take 1 tablet by mouth daily, Disp: 30 tablet, Rfl: 5    Multiple Vitamin (MULTIVITAMIN) tablet, Take 1 tablet by mouth daily Dispense tab-a-cathy if available, Disp: 30 tablet, Rfl: 5    Elastic Bandages & Supports (MEDICAL COMPRESSION STOCKINGS) MISC, 1 each by Does not apply route daily as needed (edema) JOBST STOCKINGS 20-30MG MERCURY, Disp: 1 each, Rfl: 0    empagliflozin (JARDIANCE) 25 MG tablet, Take 25 mg by mouth daily, Disp: , Rfl:     Omega-3 Fatty Acids (FISH OIL) 1000 MG CAPS, Take 3 capsules by mouth daily (Patient taking differently: Take 1,000 mg by mouth daily ), Disp: 90 capsule, Rfl: 6    azaTHIOprine (IMURAN) 50 MG tablet, take 1 tablet by mouth three times a day, Disp: 90 tablet, Rfl: 3    Insulin Pen Needle 31G X 8 MM MISC, For injections of insulin 4 times a day, Disp: 120 each, Rfl: 5    Dulaglutide (TRULICITY) 1.5 UG/5.1QX SOPN, Inject into the skin, Disp: , Rfl:     aspirin 81 MG tablet, Take 81 mg by mouth daily, Disp: , Rfl:     insulin glargine (TOUJEO SOLOSTAR) 300 UNIT/ML injection pen, Inject into the skin once a week , Disp: , Rfl:   Allergies   Allergen Reactions    Lisinopril Swelling     Tongue swells     Ace Inhibitors     Amino Acids        Past Medical History:   Diagnosis Date    Arthritis     Colitis     Colon polyps     Coronary artery disease of native artery of native heart with stable angina pectoris (HCC)     Diabetes mellitus ([facility] [address]     Diverticulosis     Headache     Hyperlipidemia     Hypertension     Osteopenia     Sleep apnea      Past Surgical History:   Procedure Laterality Date    APPENDECTOMY      BACK SURGERY      CARDIAC CATHETERIZATION  03/29/2017    Dr. Cortez Katy    [facility]     [facility] [facility]  04/06/2017    x2    ENDOSCOPY, COLON, DIAGNOSTIC      FRACTURE SURGERY      wrist    FRACTURE SURGERY      left tibia    FRACTURE SURGERY      fingers    FRACTURE SURGERY      collar bone    UVULECTOMY       Family History   Problem Relation Age of Onset    Asthma Mother     Diabetes Father     Heart Disease Father     Coronary Art Dis Father      Social History     Socioeconomic History    Marital status:      Spouse name: Not on file    Number of children: Not on file    Years of education: Not on file    Highest education level: Not on file   Occupational History    Not on file   Tobacco Use    Smoking status: Former Smoker     Packs/day: 1.50     Years: 35.00     Pack years: 52.50     Types: E-Cigarettes, Cigarettes     Quit date: 2015     Years since quittin.1    Smokeless tobacco: Never Used    Tobacco comment: currently uses vapor cigarettes   Substance and Sexual Activity    Alcohol use: Yes     Alcohol/week: 10.0 standard drinks     Types: 10 Cans of beer per week     Comment: occasionally    Drug use: No    Sexual activity: Not on file   Other Topics Concern    Not on file   Social History Narrative    Not on file     Social Determinants of Health     Financial Resource Strain: Low Risk     Difficulty of Paying Living Expenses: Not hard at all   Food Insecurity: No Food Insecurity    Worried About [employer] in the Last Year: Never true    [facility] [employer] in the Last Year: Never true   Transportation Needs:     Lack of Transportation (Medical): Not on file    Lack of Transportation (Non-Medical):  Not on file   Physical Activity:     Days of Exercise per Week: Not on file    Minutes of Exercise per Session: Not on file   Stress:     Feeling of Stress : Not on file   Social Connections:     Frequency of Communication with Friends and Family: Not on file    Frequency of Social Gatherings with Friends and Family: Not on file    Attends [employer] Services: Not on file    Active Member of Clubs or Organizations: Not on file    Attends Club or Organization Meetings: Not on file    Marital Status: Not on file   Intimate Partner Violence:     Fear of Current or Ex-Partner: Not on file    Emotionally Abused: Not on file    Physically Abused: Not on file    Sexually Abused: Not on file   Housing Stability:     Unable to Pay for Housing in the Last Year: Not on file    Number of [place] in the Last Year: Not on file    Unstable Housing in the Last Year: Not on file       Vitals:    02/10/22 0659   BP: (!) 132/58   Pulse: 84   Temp: 97.3 °F (36.3 °C)   TempSrc: Temporal   SpO2: 96%   Weight: 241 lb 4.8 oz (109.5 kg)   Height: 6' (1.829 m)       Physical Exam    Constitutional: He is oriented to person, place, and time. He appears well-developed and well-nourished. No distress.   Overweight   Neck: Normal range of motion. Neck supple. No JVD present. No thyromegaly present. Cardiovascular: Normal rate, regular rhythm, normal heart sounds and intact distal pulses. Exam reveals no gallop and no friction rub.   No murmur heard. Mid sternotomy scar from his CABG   Pulmonary/Chest: Effort normal and breath sounds normal. No respiratory distress. He has no wheezes. He has no rales. Abdominal: Soft. Bowel sounds are normal. He exhibits no distension and no mass. There is no tenderness. Musculoskeletal: Normal range of motion.   Pulses palpable distally lymphadenopathy:     He has no cervical adenopathy. Neurological: He is alert and oriented to person, place, and time. No sensory deficit. He exhibits normal muscle tone. Coordination normal.   Skin: Skin is warm and dry.  Does have several nevi on his back.  I did ask him to see dermatology at least once for an evaluation to make sure none of these need biopsy.  patient never did follow through with this.    Did examine feet. He does have good pulses. Feet are very dry and scaly. psychiatric: He has a normal mood and affect. His behavior is normal.   Nursing note and vitals reviewed.           Assessment and Plan:  Candido Sandra was seen today for hypertension and coronary artery disease. Diagnoses and all orders for this visit:    Mixed hyperlipidemia  -     atorvastatin (LIPITOR) 40 MG tablet; Take 1 tablet by mouth daily  -     fenofibrate (TRICOR) 145 MG tablet; Take 1 tablet by mouth daily  -     Lipid Panel;  Future  Stable on statin medication    Diabetes mellitus type 2, insulin dependent (HCC)  -     metFORMIN (GLUCOPHAGE) 500 MG tablet; Take 2 tablets by mouth 2 times daily (with meals)  -     Microalbumin / Creatinine Urine Ratio; Future  -     [facility] Luis Daniel Llamas DPM, Podiatry, [place]  Stable on diabetic medication    Anxiety  -     sertraline (ZOLOFT) 50 MG tablet; TAKE 1 TABLET DAILY  Stable on SSRI    Ulcerative colitis without complications, unspecified location (HCC)  -     mesalamine (LIALDA) 1.2 g EC tablet; Take 1 tablet by mouth 3 times daily  Stable on his mesalamine/Imuran    Coronary artery disease of native artery of native heart with stable angina pectoris (HCC)  -     metoprolol tartrate (LOPRESSOR) 25 MG tablet; Take 1 tablet by mouth 2 times daily  Stable follow-up with cardiology    Essential hypertension  -     Comprehensive Metabolic Panel; Future  -     CBC Auto Differential; Future  Stable on current antihypertensive medicine    Gastroesophageal reflux disease without esophagitis  Stable on PPI    Osteopenia, unspecified location  Stable on bisphosphonate    Vitamin D deficiency    Plan: I will see him back in 4 months and as needed. Follow-up with above consultants. Blood work to monitor disease progression and medication use this will be done in 3 months. I did review his recent blood work with him. Podiatry referral.  Prescription management performed. We will send his labs to GI/Dr. Angely Joshi. Notify us of problems in the interim. Return in about 4 months (around 6/10/2022). Seen By:  Remy Houston MD      *Document was created using voice recognition software. Note was reviewed however may contain grammatical errors.

## 2022-03-11 LAB
AVERAGE GLUCOSE: NORMAL
HBA1C MFR BLD: 6.1 %

## 2022-03-30 DIAGNOSIS — E11.9 DIABETES MELLITUS TYPE 2, INSULIN DEPENDENT (HCC): ICD-10-CM

## 2022-03-30 DIAGNOSIS — E78.2 MIXED HYPERLIPIDEMIA: ICD-10-CM

## 2022-03-30 DIAGNOSIS — Z79.4 DIABETES MELLITUS TYPE 2, INSULIN DEPENDENT (HCC): ICD-10-CM

## 2022-03-30 DIAGNOSIS — K51.90 ULCERATIVE COLITIS WITHOUT COMPLICATIONS, UNSPECIFIED LOCATION (HCC): ICD-10-CM

## 2022-03-30 RX ORDER — FENOFIBRATE 145 MG/1
145 TABLET, COATED ORAL DAILY
Qty: 90 TABLET | Refills: 1 | Status: CANCELLED | OUTPATIENT
Start: 2022-03-30

## 2022-03-30 RX ORDER — ATORVASTATIN CALCIUM 40 MG/1
40 TABLET, FILM COATED ORAL DAILY
Qty: 90 TABLET | Refills: 1 | Status: CANCELLED | OUTPATIENT
Start: 2022-03-30

## 2022-03-30 RX ORDER — MESALAMINE 1.2 G/1
1.2 TABLET, DELAYED RELEASE ORAL 3 TIMES DAILY
Qty: 270 TABLET | Refills: 1 | Status: CANCELLED | OUTPATIENT
Start: 2022-03-30

## 2022-03-30 NOTE — TELEPHONE ENCOUNTER
Last Appointment:  2/10/2022  Future Appointments   Date Time Provider Yogi Kaitlin   6/16/2022  7:15 AM Ramez Schulte MD [facility]   6/16/2022  8:00 AM Abigail Do DPM [facility]

## 2022-04-06 LAB — DIABETIC RETINOPATHY: NEGATIVE

## 2022-05-20 LAB
A/G RATIO: 1.2 RATIO (ref 1.1–2.2)
ALBUMIN SERPL-MCNC: 3.9 G/DL (ref 3.4–4.8)
ALP BLD-CCNC: 49 U/L (ref 42–121)
ALT SERPL-CCNC: 43 U/L (ref 10–63)
ANION GAP SERPL CALCULATED.3IONS-SCNC: 12 MEQ/L (ref 3–11)
AST SERPL-CCNC: 36 U/L (ref 10–41)
BASOPHILS ABSOLUTE: 0.1 K/UL (ref 0–0.2)
BASOPHILS RELATIVE PERCENT: 0.7 % (ref 0–1.5)
BILIRUB SERPL-MCNC: 0.8 MG/DL (ref 0.3–1.5)
BUN BLDV-MCNC: 15 MG/DL (ref 6–20)
CALCIUM SERPL-MCNC: 9.2 MG/DL (ref 8.5–10.5)
CHLORIDE BLD-SCNC: 104 MEQ/L (ref 98–107)
CHOLESTEROL: 123 MG/DL (ref 140–200)
CO2: 23 MEQ/L (ref 21–31)
CREAT SERPL-MCNC: 0.9 MG/DL (ref 0.6–1.3)
CREATININE + EGFR PANEL: 102 ML/MIN
CREATININE URINE: 78.5 MG/DL (ref 22–328)
EOSINOPHILS ABSOLUTE: 0.4 K/UL (ref 0–0.33)
EOSINOPHILS RELATIVE PERCENT: 4.3 % (ref 0–3)
GFR NON-AFRICAN AMERICAN: 84 ML/MIN
GLOBULIN: 3.3 G/DL (ref 1.9–3.9)
GLUCOSE BLD-MCNC: 67 MG/DL (ref 70–99)
HCT VFR BLD CALC: 46.4 % (ref 41–50)
HDLC SERPL-MCNC: 39 MG/DL (ref 29–71)
HEMOGLOBIN: 15.5 G/DL (ref 13.5–16.5)
LDL CHOLESTEROL: 61 MG/DL
LDL/HDL RATIO: 1.6 RATIO
LYMPHOCYTES ABSOLUTE: 2.1 K/UL (ref 1.1–4.8)
LYMPHOCYTES RELATIVE PERCENT: 22.9 % (ref 24–44)
MCH RBC QN AUTO: 30.9 PG (ref 28–34)
MCHC RBC AUTO-ENTMCNC: 33.4 G/DL (ref 33–37)
MCV RBC AUTO: 92.5 FL (ref 80–100)
MICROALBUMIN UR-MCNC: 7.9 UG/ML
MICROALBUMIN/CREAT UR-RTO: 10.1 MG/G
MONOCYTES ABSOLUTE: 0.8 K/UL (ref 0.2–0.7)
MONOCYTES RELATIVE PERCENT: 8.4 % (ref 3.4–9)
NEUTROPHILS ABSOLUTE: 5.8 K/UL (ref 1.83–8.7)
PDW BLD-RTO: 15.9 % (ref 10.9–14.3)
PLATELET # BLD: 322 K/UL (ref 150–450)
PMV BLD AUTO: 9.4 FL (ref 7.4–10.4)
POTASSIUM SERPL-SCNC: 3.7 MEQ/L (ref 3.6–5)
RBC # BLD: 5.01 M/UL (ref 4.5–5.5)
SEGMENTED NEUTROPHILS RELATIVE PERCENT: 63.7 % (ref 40–74)
SODIUM BLD-SCNC: 139 MEQ/L (ref 135–145)
TOTAL PROTEIN: 7.2 G/DL (ref 5.9–7.8)
TRIGL SERPL-MCNC: 124 MG/DL (ref 41–189)
WBC # BLD: 9.1 K/UL (ref 4.5–11)

## 2022-06-16 ENCOUNTER — OFFICE VISIT (OUTPATIENT)
Dept: FAMILY MEDICINE CLINIC | Age: 67
End: 2022-06-16
Payer: MEDICARE

## 2022-06-16 ENCOUNTER — OFFICE VISIT (OUTPATIENT)
Dept: PODIATRY | Age: 67
End: 2022-06-16
Payer: MEDICARE

## 2022-06-16 VITALS
SYSTOLIC BLOOD PRESSURE: 134 MMHG | HEART RATE: 76 BPM | TEMPERATURE: 97.2 F | BODY MASS INDEX: 33.63 KG/M2 | DIASTOLIC BLOOD PRESSURE: 64 MMHG | WEIGHT: 248 LBS

## 2022-06-16 VITALS
SYSTOLIC BLOOD PRESSURE: 134 MMHG | HEIGHT: 72 IN | BODY MASS INDEX: 33.67 KG/M2 | HEART RATE: 76 BPM | DIASTOLIC BLOOD PRESSURE: 64 MMHG | WEIGHT: 248.6 LBS | OXYGEN SATURATION: 99 % | TEMPERATURE: 97.2 F

## 2022-06-16 DIAGNOSIS — E11.9 DIABETES MELLITUS TYPE 2, INSULIN DEPENDENT (HCC): ICD-10-CM

## 2022-06-16 DIAGNOSIS — L85.3 XEROSIS CUTIS: ICD-10-CM

## 2022-06-16 DIAGNOSIS — K21.9 GASTROESOPHAGEAL REFLUX DISEASE WITHOUT ESOPHAGITIS: ICD-10-CM

## 2022-06-16 DIAGNOSIS — I10 ESSENTIAL HYPERTENSION: ICD-10-CM

## 2022-06-16 DIAGNOSIS — I25.118 CORONARY ARTERY DISEASE OF NATIVE ARTERY OF NATIVE HEART WITH STABLE ANGINA PECTORIS (HCC): ICD-10-CM

## 2022-06-16 DIAGNOSIS — F41.9 ANXIETY: ICD-10-CM

## 2022-06-16 DIAGNOSIS — E11.9 TYPE 2 DIABETES MELLITUS WITHOUT COMPLICATION, UNSPECIFIED WHETHER LONG TERM INSULIN USE (HCC): Primary | ICD-10-CM

## 2022-06-16 DIAGNOSIS — B35.3 TINEA PEDIS OF BOTH FEET: ICD-10-CM

## 2022-06-16 DIAGNOSIS — K51.90 ULCERATIVE COLITIS WITHOUT COMPLICATIONS, UNSPECIFIED LOCATION (HCC): ICD-10-CM

## 2022-06-16 DIAGNOSIS — Z79.4 DIABETES MELLITUS TYPE 2, INSULIN DEPENDENT (HCC): ICD-10-CM

## 2022-06-16 DIAGNOSIS — M62.830 SPASM OF MUSCLE OF LOWER BACK: ICD-10-CM

## 2022-06-16 DIAGNOSIS — E78.2 MIXED HYPERLIPIDEMIA: ICD-10-CM

## 2022-06-16 DIAGNOSIS — Z12.5 SCREENING FOR MALIGNANT NEOPLASM OF PROSTATE: ICD-10-CM

## 2022-06-16 PROCEDURE — 1036F TOBACCO NON-USER: CPT | Performed by: PODIATRIST

## 2022-06-16 PROCEDURE — G8417 CALC BMI ABV UP PARAM F/U: HCPCS | Performed by: INTERNAL MEDICINE

## 2022-06-16 PROCEDURE — G8417 CALC BMI ABV UP PARAM F/U: HCPCS | Performed by: PODIATRIST

## 2022-06-16 PROCEDURE — G8427 DOCREV CUR MEDS BY ELIG CLIN: HCPCS | Performed by: INTERNAL MEDICINE

## 2022-06-16 PROCEDURE — 1123F ACP DISCUSS/DSCN MKR DOCD: CPT | Performed by: INTERNAL MEDICINE

## 2022-06-16 PROCEDURE — 2022F DILAT RTA XM EVC RTNOPTHY: CPT | Performed by: PODIATRIST

## 2022-06-16 PROCEDURE — 1123F ACP DISCUSS/DSCN MKR DOCD: CPT | Performed by: PODIATRIST

## 2022-06-16 PROCEDURE — 2022F DILAT RTA XM EVC RTNOPTHY: CPT | Performed by: INTERNAL MEDICINE

## 2022-06-16 PROCEDURE — 3017F COLORECTAL CA SCREEN DOC REV: CPT | Performed by: PODIATRIST

## 2022-06-16 PROCEDURE — 3017F COLORECTAL CA SCREEN DOC REV: CPT | Performed by: INTERNAL MEDICINE

## 2022-06-16 PROCEDURE — 3044F HG A1C LEVEL LT 7.0%: CPT | Performed by: PODIATRIST

## 2022-06-16 PROCEDURE — 99214 OFFICE O/P EST MOD 30 MIN: CPT | Performed by: INTERNAL MEDICINE

## 2022-06-16 PROCEDURE — 99203 OFFICE O/P NEW LOW 30 MIN: CPT | Performed by: PODIATRIST

## 2022-06-16 PROCEDURE — 1036F TOBACCO NON-USER: CPT | Performed by: INTERNAL MEDICINE

## 2022-06-16 PROCEDURE — 3044F HG A1C LEVEL LT 7.0%: CPT | Performed by: INTERNAL MEDICINE

## 2022-06-16 PROCEDURE — G8427 DOCREV CUR MEDS BY ELIG CLIN: HCPCS | Performed by: PODIATRIST

## 2022-06-16 RX ORDER — PRENATAL VIT 91/IRON/FOLIC/DHA 28-975-200
COMBINATION PACKAGE (EA) ORAL
Qty: 42 G | Refills: 2 | Status: SHIPPED | OUTPATIENT
Start: 2022-06-16

## 2022-06-16 RX ORDER — AMMONIUM LACTATE 12 G/100G
LOTION TOPICAL
Qty: 400 G | Refills: 2 | Status: SHIPPED | OUTPATIENT
Start: 2022-06-16

## 2022-06-16 RX ORDER — PREDNISONE 2.5 MG
2.5 TABLET ORAL DAILY
Qty: 90 TABLET | Refills: 1 | Status: SHIPPED
Start: 2022-06-16 | End: 2022-10-19 | Stop reason: SDUPTHER

## 2022-06-16 RX ORDER — CYCLOBENZAPRINE HCL 5 MG
5 TABLET ORAL EVERY 8 HOURS PRN
Qty: 90 TABLET | Refills: 1 | Status: SHIPPED
Start: 2022-06-16 | End: 2022-10-19 | Stop reason: SDUPTHER

## 2022-06-16 RX ORDER — ATORVASTATIN CALCIUM 40 MG/1
40 TABLET, FILM COATED ORAL DAILY
Qty: 90 TABLET | Refills: 1 | Status: SHIPPED
Start: 2022-06-16 | End: 2022-10-19 | Stop reason: SDUPTHER

## 2022-06-16 RX ORDER — AMLODIPINE BESYLATE 10 MG/1
10 TABLET ORAL DAILY
Qty: 90 TABLET | Refills: 1 | Status: SHIPPED
Start: 2022-06-16 | End: 2022-10-19 | Stop reason: SDUPTHER

## 2022-06-16 RX ORDER — MESALAMINE 1.2 G/1
1.2 TABLET, DELAYED RELEASE ORAL 3 TIMES DAILY
Qty: 270 TABLET | Refills: 1 | Status: SHIPPED
Start: 2022-06-16 | End: 2022-10-19 | Stop reason: SDUPTHER

## 2022-06-16 RX ORDER — FENOFIBRATE 145 MG/1
145 TABLET, COATED ORAL DAILY
Qty: 90 TABLET | Refills: 1 | Status: SHIPPED
Start: 2022-06-16 | End: 2022-10-19 | Stop reason: SDUPTHER

## 2022-06-16 RX ORDER — ALENDRONATE SODIUM 70 MG/1
70 TABLET ORAL
Qty: 12 TABLET | Refills: 1 | Status: SHIPPED
Start: 2022-06-16 | End: 2022-10-19 | Stop reason: SDUPTHER

## 2022-06-16 RX ORDER — PANTOPRAZOLE SODIUM 40 MG/1
40 TABLET, DELAYED RELEASE ORAL DAILY
Qty: 90 TABLET | Refills: 1 | Status: SHIPPED
Start: 2022-06-16 | End: 2022-10-19 | Stop reason: SDUPTHER

## 2022-06-16 NOTE — PROGRESS NOTES
22  Ibeth  : 1955 Sex: male  Age: 77 y.o. Patient was referred by Rafa Cobian MD    CC:    Diabetic exam  Dry skin both feet    HPI:   This pleasant 66-year-old male patient referred to me today diabetic foot ankle exam.  Recent hemoglobin A1c from March was 6.1. Does have a history of dry skin both feet. No current treatment or any topical or prescription lotions. Denies any open skin lesions of his legs. No foot or ankle pain today. Does have a history of seeing another podiatrist Dr. Bud Hannah in the past.  History diabetes with metformin control. No additional pedal complaints at this time. ROS:  Const: Denies constitutional symptoms  Musculo: Denies symptoms other than stated above  Skin: Denies symptoms other than stated above       Current Outpatient Medications:     ammonium lactate (LAC-HYDRIN) 12 % lotion, Apply topically twice daily, Disp: 400 g, Rfl: 2    terbinafine (LAMISIL) 1 % cream, Apply affected area topically 2 times daily. , Disp: 42 g, Rfl: 2    alendronate (FOSAMAX) 70 MG tablet, Take 1 tablet by mouth every 7 days, Disp: 12 tablet, Rfl: 1    amLODIPine (NORVASC) 10 MG tablet, Take 1 tablet by mouth daily, Disp: 90 tablet, Rfl: 1    atorvastatin (LIPITOR) 40 MG tablet, Take 1 tablet by mouth daily, Disp: 90 tablet, Rfl: 1    cyclobenzaprine (FLEXERIL) 5 MG tablet, Take 1 tablet by mouth every 8 hours as needed for Muscle spasms (in the low back), Disp: 90 tablet, Rfl: 1    fenofibrate (TRICOR) 145 MG tablet, Take 1 tablet by mouth daily, Disp: 90 tablet, Rfl: 1    mesalamine (LIALDA) 1.2 g EC tablet, Take 1 tablet by mouth 3 times daily, Disp: 270 tablet, Rfl: 1    metFORMIN (GLUCOPHAGE) 500 MG tablet, Take 2 tablets by mouth 2 times daily (with meals), Disp: 360 tablet, Rfl: 1    metoprolol tartrate (LOPRESSOR) 25 MG tablet, Take 1 tablet by mouth 2 times daily, Disp: 180 tablet, Rfl: 1    pantoprazole (PROTONIX) 40 MG tablet, Take 1 tablet by mouth daily, Disp: 90 tablet, Rfl: 1    predniSONE (DELTASONE) 2.5 MG tablet, Take 1 tablet by mouth daily, Disp: 90 tablet, Rfl: 1    sertraline (ZOLOFT) 50 MG tablet, TAKE 1 TABLET DAILY, Disp: 90 tablet, Rfl: 1    naproxen (NAPROSYN) 500 MG tablet, take 1 tablet by mouth twice a day if needed for pain, Disp: 60 tablet, Rfl: 5    blood glucose test strips (ONETOUCH ULTRA) strip, TEST 4 TIMES DAILY, Disp: 400 strip, Rfl: 1    insulin lispro, 1 Unit Dial, 100 UNIT/ML SOPN, Adjust units accordingly as needed, Disp: , Rfl:     insulin glargine (LANTUS) 100 UNIT/ML injection vial, Inject into the skin every morning 62 units, Disp: , Rfl:     OneTouch Delica Lancets 79V MISC, 1 Device by Other route 4 times daily, Disp: 200 each, Rfl: 6    vitamin D (ERGOCALCIFEROL) 1.25 MG (40225 UT) CAPS capsule, Take 1 capsule by mouth every 30 days, Disp: 6 capsule, Rfl: 0    vitamin C (ASCORBIC ACID) 500 MG tablet, Take 1 tablet by mouth daily, Disp: 30 tablet, Rfl: 5    Multiple Vitamin (MULTIVITAMIN) tablet, Take 1 tablet by mouth daily Dispense tab-a-cathy if available, Disp: 30 tablet, Rfl: 5    Elastic Bandages & Supports (MEDICAL COMPRESSION STOCKINGS) MISC, 1 each by Does not apply route daily as needed (edema) JOBST STOCKINGS 20-30MG MERCURY, Disp: 1 each, Rfl: 0    empagliflozin (JARDIANCE) 25 MG tablet, Take 25 mg by mouth daily, Disp: , Rfl:     Omega-3 Fatty Acids (FISH OIL) 1000 MG CAPS, Take 3 capsules by mouth daily (Patient taking differently: Take 1,000 mg by mouth daily ), Disp: 90 capsule, Rfl: 6    azaTHIOprine (IMURAN) 50 MG tablet, take 1 tablet by mouth three times a day, Disp: 90 tablet, Rfl: 3    Insulin Pen Needle 31G X 8 MM MISC, For injections of insulin 4 times a day, Disp: 120 each, Rfl: 5    Dulaglutide (TRULICITY) 1.5 AM/5.6PX SOPN, Inject into the skin, Disp: , Rfl:     aspirin 81 MG tablet, Take 81 mg by mouth daily, Disp: , Rfl:     insulin glargine (TOUJEO SOLOSTAR) 300 UNIT/ML injection pen, Inject into the skin once a week , Disp: , Rfl:   Allergies   Allergen Reactions    Lisinopril Swelling     Tongue swells     Ace Inhibitors     Amino Acids        Past Medical History:   Diagnosis Date    Arthritis     Colitis     Colon polyps     Coronary artery disease of native artery of native heart with stable angina pectoris ([facility] [address]     Diabetes [facility] ([facility]     Diverticulosis     Headache     Hyperlipidemia     Hypertension     Osteopenia     Sleep apnea            Vitals:    06/16/22 0754   BP: 134/64   Pulse: 76   Temp: 97.2 °F (36.2 °C)   Weight: 248 lb (112.5 kg)       Work History/Social History: Foot and ankle history:     Focused Lower Extremity Physical Exam:    Neurovascular examination:    Dorsalis Pedis palpable bilateral.  Posterior tibialis palpable bilateral.    Capillary Refill Time:  Immediate return  Hair growth:  Symmetrical and bilateral   Skin:  Not atrophic  Edema: No edema bilateral feet or ankles. Neurologic:  Light touch intact bilateral.      Musculoskeletal/ Orthopedic examination:    Equinis: Absent bilateral  Dorsiflexion, plantarflexion, inversion, eversion bilateral 5 out of 5 muscle strength  Wiggling toes  Negative [facility]    Dermatology examination:    Dry skin plantar feet bilateral.  There is moccasin distribution with cracking skin. No open or draining wounds. Thickening noted second toenail bilateral with mycotic nail. Assessment and Plan:  Paula James was seen today for new patient. Diagnoses and all orders for this visit:    Type 2 diabetes mellitus without complication, unspecified whether long term insulin use (HCC)    Tinea pedis of both feet    Xerosis cutis    Other orders  -     ammonium lactate (LAC-HYDRIN) 12 % lotion; Apply topically twice daily  -     terbinafine (LAMISIL) 1 % cream; Apply affected area topically 2 times daily.       Paula James seen new referral today diabetic foot and ankle exam.  Clinically presents as tinea pedis and xerosis cutis. Hemoglobin A1c from 3/11/2022.  6.1  I did recommend ammonium lactate 12% lotion and Lamisil 1% cream to be applied twice daily bottom both feet. Did recommend avoiding barefoot. Any new skin lesions or abrasions come in sooner. He does have regular follow-up with his primary care physician Dr. Best Lab October 19, 2022. I will see him in 4 months. Return in about 4 months (around 10/19/2022). Seen By:  Magen De Leon DPM      Document was created using voice recognition software. Note was reviewed, however may contain grammatical errors.

## 2022-06-19 NOTE — PROGRESS NOTES
denies dysuria, frequency and frequent UTI's. Musculo: Reports arthritis. Skin: Denies eczema, pruritus and sores. Neuro: Denies dizziness, headache, seizures and syncope. Psych: Anxious-improved on Zoloft              REST OF PERTINENT ROS GONE OVER AND WAS NEGATIVE.     :  Problem List: Chronic ulcerative enterocolitis, Hyperlipidemia, Type 2 diabetes mellitus, Benign essential  hypertension  Health Maintenance:  Bone Density Scan - (10/16/2014), 7/21-positive FRAX score  Colonoscopy Screening - (10/20/2014)  Colonoscopy - (2/21/2008)  Colonoscopy - (5/28/2010)  Colonoscopy - (6/29/2012)  Colonoscopy - (10/20/2014)  Influenza Vaccination - (9/11/2015)  Colonoscopy - 2/08,5/10,12/11,6/12,10/14-Dr swartz, 6/20, 6/21-due 23  Rectal Exam - 5/09,9/10,12/11,7/13-declined  PSA Test - 5/09,9/10,4/12,7/13  Prostate Exam - 5/09,9/10,12/11,7/13-declined  EKG - 5/09,11/13  EGD - Dr. Amanda Tom 24  Medical Problems:  Ulcerative Colitis - follows with drkolosi  Hyperlipidemia  chronic right shoulder pain - ac arthritis and rotator cuff tear  olecranon septic bursitis, Hypertension, Type 2 Diabetes  Headache - cluster  microdiscectomy L4L5  Sleep Apnea - uvulectomy- can't wear mask-declined further eval  Osteopenia  microscopic hematuria - Was evaluated by urology. Diverticulosis, Proteinuria, Colon Polyps, poor compliance  CAD - CABG 2017  Barrettes Esophagitis, Fatty Liver, Kidney Stones  Follows with - Cardiology ,pharmacist, ophthalmology, GI, urology  FH:  Father:  Coronary Artery Disease (CAD), Non Insulin Dependent Diabetes. Mother:  CLL, Hypertension, Chronic Obstructive Pulmonary Disease (COPD). Sister 1:  osteopenia. Paternal Uncle 1:  Lung Cancer. SH:  . (Marital)  Personal Habits: Cigarette Use: vapes. Alcohol: Current Alcohol Use                      Current Outpatient Medications:     alendronate (FOSAMAX) 70 MG tablet, Take 1 tablet by mouth every 7 days, Disp: 12 tablet, Rfl: 1    amLODIPine (NORVASC) 10 MG tablet, Take 1 tablet by mouth daily, Disp: 90 tablet, Rfl: 1    atorvastatin (LIPITOR) 40 MG tablet, Take 1 tablet by mouth daily, Disp: 90 tablet, Rfl: 1    cyclobenzaprine (FLEXERIL) 5 MG tablet, Take 1 tablet by mouth every 8 hours as needed for Muscle spasms (in the low back), Disp: 90 tablet, Rfl: 1    fenofibrate (TRICOR) 145 MG tablet, Take 1 tablet by mouth daily, Disp: 90 tablet, Rfl: 1    mesalamine (LIALDA) 1.2 g EC tablet, Take 1 tablet by mouth 3 times daily, Disp: 270 tablet, Rfl: 1    metFORMIN (GLUCOPHAGE) 500 MG tablet, Take 2 tablets by mouth 2 times daily (with meals), Disp: 360 tablet, Rfl: 1    metoprolol tartrate (LOPRESSOR) 25 MG tablet, Take 1 tablet by mouth 2 times daily, Disp: 180 tablet, Rfl: 1    pantoprazole (PROTONIX) 40 MG tablet, Take 1 tablet by mouth daily, Disp: 90 tablet, Rfl: 1    predniSONE (DELTASONE) 2.5 MG tablet, Take 1 tablet by mouth daily, Disp: 90 tablet, Rfl: 1    sertraline (ZOLOFT) 50 MG tablet, TAKE 1 TABLET DAILY, Disp: 90 tablet, Rfl: 1    naproxen (NAPROSYN) 500 MG tablet, take 1 tablet by mouth twice a day if needed for pain, Disp: 60 tablet, Rfl: 5    blood glucose test strips (ONETOUCH ULTRA) strip, TEST 4 TIMES DAILY, Disp: 400 strip, Rfl: 1    insulin lispro, 1 Unit Dial, 100 UNIT/ML SOPN, Adjust units accordingly as needed, Disp: , Rfl:     insulin glargine (LANTUS) 100 UNIT/ML injection vial, Inject into the skin every morning 62 units, Disp: , Rfl:     OneTouch Delica Lancets 01V MISC, 1 Device by Other route 4 times daily, Disp: 200 each, Rfl: 6    vitamin D (ERGOCALCIFEROL) 1.25 MG (88153 UT) CAPS capsule, Take 1 capsule by mouth every 30 days, Disp: 6 capsule, Rfl: 0    vitamin C (ASCORBIC ACID) 500 MG tablet, Take 1 tablet by mouth daily, Disp: 30 tablet, Rfl: 5    Multiple Vitamin (MULTIVITAMIN) tablet, Take 1 tablet by mouth daily Dispense tab-a-cathy if available, Disp: 30 tablet, Rfl: 5    Elastic Bandages & Supports (MEDICAL COMPRESSION STOCKINGS) MISC, 1 each by Does not apply route daily as needed (edema) JOBST STOCKINGS 20-30MG MERCURY, Disp: 1 each, Rfl: 0    empagliflozin (JARDIANCE) 25 MG tablet, Take 25 mg by mouth daily, Disp: , Rfl:     Omega-3 Fatty Acids (FISH OIL) 1000 MG CAPS, Take 3 capsules by mouth daily (Patient taking differently: Take 1,000 mg by mouth daily ), Disp: 90 capsule, Rfl: 6    azaTHIOprine (IMURAN) 50 MG tablet, take 1 tablet by mouth three times a day, Disp: 90 tablet, Rfl: 3    Insulin Pen Needle 31G X 8 MM MISC, For injections of insulin 4 times a day, Disp: 120 each, Rfl: 5    Dulaglutide (TRULICITY) 1.5 CY/0.9EM SOPN, Inject into the skin, Disp: , Rfl:     aspirin 81 MG tablet, Take 81 mg by mouth daily, Disp: , Rfl:     insulin glargine (TOUJEO SOLOSTAR) 300 UNIT/ML injection pen, Inject into the skin once a week , Disp: , Rfl:     ammonium lactate (LAC-HYDRIN) 12 % lotion, Apply topically twice daily, Disp: 400 g, Rfl: 2    terbinafine (LAMISIL) 1 % cream, Apply affected area topically 2 times daily. , Disp: 42 g, Rfl: 2  Allergies   Allergen Reactions    Lisinopril Swelling     Tongue swells     Ace Inhibitors     Amino Acids        Past Medical History:   Diagnosis Date    Arthritis     Colitis     Colon polyps     Coronary artery disease of native artery of native heart with stable angina pectoris [facility]     Diabetes mellitus ([facility]     Diverticulosis     Headache     Hyperlipidemia     Hypertension     Osteopenia     Sleep apnea      Past Surgical History:   Procedure Laterality Date    APPENDECTOMY      BACK SURGERY      CARDIAC CATHETERIZATION  03/29/2017    Dr. Cherylene Miller GRAFT  04/06/2017    x2    ENDOSCOPY, COLON, DIAGNOSTIC      FRACTURE SURGERY      wrist    FRACTURE SURGERY      left tibia    FRACTURE SURGERY      fingers    FRACTURE SURGERY      collar bone    UVULECTOMY       Family History Problem Relation Age of Onset    Asthma Mother     Diabetes Father     Heart Disease Father     Coronary Art Dis Father      Social History     Socioeconomic History    Marital status:      Spouse name: Not on file    Number of children: Not on file    Years of education: Not on file    Highest education level: Not on file   Occupational History    Not on file   Tobacco Use    Smoking status: Former Smoker     Packs/day: 1.50     Years: 35.00     Pack years: 52.50     Types: E-Cigarettes, Cigarettes     Quit date: 2015     Years since quittin.4    Smokeless tobacco: Never Used    Tobacco comment: currently uses vapor cigarettes   Substance and Sexual Activity    Alcohol use: Yes     Alcohol/week: 10.0 standard drinks     Types: 10 Cans of beer per week     Comment: occasionally    Drug use: No    Sexual activity: Not on file   Other Topics Concern    Not on file   Social History Narrative    Not on file     Social Determinants of Health     Financial Resource Strain: Low Risk     Difficulty of Paying Living Expenses: Not hard at all   Food Insecurity: No Food Insecurity    Worried About [address] [employer] in the Last Year: Never true    [address] in the Last Year: Never true   Transportation Needs:     Lack of Transportation (Medical): Not on file    Lack of Transportation (Non-Medical):  Not on file   Physical Activity:     Days of Exercise per Week: Not on file    Minutes of Exercise per Session: Not on file   Stress:     Feeling of Stress : Not on file   Social Connections:     Frequency of Communication with Friends and Family: Not on file    Frequency of Social Gatherings with Friends and Family: Not on file    Attends [employer]ism Services: Not on file    Active Member of Clubs or Organizations: Not on file    Attends Club or Organization Meetings: Not on file    Marital Status: Not on file   Intimate Partner Violence:     Fear of Current or Ex-Partner: Not on file    Emotionally Abused: Not on file    Physically Abused: Not on file    Sexually Abused: Not on file   Housing Stability:     Unable to Pay for Housing in the Last Year: Not on file    Number of Places Lived in the Last Year: Not on file    Unstable Housing in the Last Year: Not on file       Vitals:    06/16/22 0703   BP: 134/64   Pulse: 76   Temp: 97.2 °F (36.2 °C)   TempSrc: Temporal   SpO2: 99%   Weight: 248 lb 9.6 oz (112.8 kg)   Height: 6' (1.829 m)       Physical Exam    Constitutional: He is oriented to person, place, and time. He appears well-developed and well-nourished. No distress.   Overweight   Neck: Normal range of motion. Neck supple. No JVD present. No thyromegaly present. Cardiovascular: Normal rate, regular rhythm, normal heart sounds and intact distal pulses. Exam reveals no gallop and no friction rub.   No murmur heard. Mid sternotomy scar from his CABG   Pulmonary/Chest: Effort normal and breath sounds normal. No respiratory distress. He has no wheezes. He has no rales. Abdominal: Soft. Bowel sounds are normal. He exhibits no distension and no mass. There is no tenderness. Musculoskeletal: Normal range of motion.   Pulses palpable distally lymphadenopathy:     He has no cervical adenopathy. Neurological: He is alert and oriented to person, place, and time. No sensory deficit. He exhibits normal muscle tone. Coordination normal.   Skin: Skin is warm and dry.  Does have several nevi on his back.  I did ask him to see dermatology at least once for an evaluation to make sure none of these need biopsy.  patient never did follow through with this.    Did examine feet. He does have good pulses. Feet are very dry and scaly. psychiatric: He has a normal mood and affect. His behavior is normal.   Nursing note and vitals reviewed.              Assessment and Plan:  Beto Simons was seen today for hyperlipidemia and hypertension.     Diagnoses and all orders for this visit:    Essential hypertension  -     amLODIPine (NORVASC) 10 MG tablet; Take 1 tablet by mouth daily  -     CBC with Auto Differential; Future  -     Comprehensive Metabolic Panel; Future  Stable and controlled on current antihypertensive medication    Mixed hyperlipidemia  -     atorvastatin (LIPITOR) 40 MG tablet; Take 1 tablet by mouth daily  -     fenofibrate (TRICOR) 145 MG tablet; Take 1 tablet by mouth daily    Spasm of muscle of lower back  -     cyclobenzaprine (FLEXERIL) 5 MG tablet; Take 1 tablet by mouth every 8 hours as needed for Muscle spasms (in the low back)  Stable on medication    Ulcerative colitis without complications, unspecified location (HCC)  -     mesalamine (LIALDA) 1.2 g EC tablet; Take 1 tablet by mouth 3 times daily  -     predniSONE (DELTASONE) 2.5 MG tablet; Take 1 tablet by mouth daily  Stable and controlled on medication-does follow with gastroenterology      Diabetes mellitus type 2, insulin dependent ([facility]  -     metFORMIN (GLUCOPHAGE) 500 MG tablet; Take 2 tablets by mouth 2 times daily (with meals)  -     Hemoglobin A1C; Future  -     Microalbumin / Creatinine Urine Ratio; Future  Stable and controlled on current diabetic medication and does follow with pharmacy diabetic clinic    Coronary artery disease of native artery of native heart with stable angina pectoris (HCC)  -     metoprolol tartrate (LOPRESSOR) 25 MG tablet; Take 1 tablet by mouth 2 times daily  Stable and controlled on current medication and does follow with cardiology    Gastroesophageal reflux disease without esophagitis  -     pantoprazole (PROTONIX) 40 MG tablet; Take 1 tablet by mouth daily  Stable and controlled on PPI    Anxiety  -     sertraline (ZOLOFT) 50 MG tablet; TAKE 1 TABLET DAILY  Stable and controlled on SSRI    Screening for malignant neoplasm of prostate  -     PSA Screening; Future    Other orders  -     alendronate (FOSAMAX) 70 MG tablet;  Take 1 tablet by mouth every 7 days    Plan: I will see him back in 4 months and as needed. I ordered blood work to be done prior to his next visit to include a PSA. Follow-up with above consultants. see above body report. To monitor blood pressure and blood sugars closely. Continue weight loss attempts. prescription management performed after review of efficacy of the medications on his current medical conditions. Notify us of problems in the interim. Return in about 4 months (around 10/16/2022). Seen By:  Zeke Chaves MD      *Document was created using voice recognition software. Note was reviewed however may contain grammatical errors.

## 2022-10-14 LAB
A/G RATIO: 1.1 RATIO (ref 1.1–2.2)
ALBUMIN SERPL-MCNC: 3.7 G/DL (ref 3.4–4.8)
ALP BLD-CCNC: 48 U/L (ref 42–121)
ALT SERPL-CCNC: 57 U/L (ref 10–63)
ANION GAP SERPL CALCULATED.3IONS-SCNC: 8 MEQ/L (ref 3–11)
AST SERPL-CCNC: 39 U/L (ref 10–41)
BASOPHILS ABSOLUTE: 0.1 K/UL (ref 0–0.2)
BASOPHILS RELATIVE PERCENT: 0.8 % (ref 0–1.5)
BILIRUB SERPL-MCNC: 0.8 MG/DL (ref 0.3–1.5)
BUN BLDV-MCNC: 11 MG/DL (ref 6–20)
CALCIUM SERPL-MCNC: 9 MG/DL (ref 8.5–10.5)
CHLORIDE BLD-SCNC: 106 MEQ/L (ref 98–107)
CO2: 25 MEQ/L (ref 21–31)
CREAT SERPL-MCNC: 1 MG/DL (ref 0.6–1.3)
CREATININE + EGFR PANEL: 90 ML/MIN
CREATININE URINE: 97.6 MG/DL (ref 22–328)
EOSINOPHILS ABSOLUTE: 0.4 K/UL (ref 0–0.33)
EOSINOPHILS RELATIVE PERCENT: 4.7 % (ref 0–3)
ESTIMATED AVERAGE GLUCOSE: 148 MG/DL
GFR NON-AFRICAN AMERICAN: 75 ML/MIN
GLOBULIN: 3.4 G/DL (ref 1.9–3.9)
GLUCOSE BLD-MCNC: 90 MG/DL (ref 70–99)
HBA1C MFR BLD: 6.8 % (ref 4–6)
HCT VFR BLD CALC: 46.7 % (ref 41–50)
HEMOGLOBIN: 15.4 G/DL (ref 13.5–16.5)
LYMPHOCYTES ABSOLUTE: 2.2 K/UL (ref 1.1–4.8)
LYMPHOCYTES RELATIVE PERCENT: 23.8 % (ref 24–44)
MCH RBC QN AUTO: 30.5 PG (ref 28–34)
MCHC RBC AUTO-ENTMCNC: 33 G/DL (ref 33–37)
MCV RBC AUTO: 92.4 FL (ref 80–100)
MICROALBUMIN UR-MCNC: 21.7 UG/ML
MICROALBUMIN/CREAT UR-RTO: 22.2 MG/G
MONOCYTES ABSOLUTE: 0.9 K/UL (ref 0.2–0.7)
MONOCYTES RELATIVE PERCENT: 9.7 % (ref 3.4–9)
NEUTROPHILS ABSOLUTE: 5.7 K/UL (ref 1.83–8.7)
PDW BLD-RTO: 16.5 % (ref 10.9–14.3)
PLATELET # BLD: 347 K/UL (ref 150–450)
PMV BLD AUTO: 8.4 FL (ref 7.4–10.4)
POTASSIUM SERPL-SCNC: 4 MEQ/L (ref 3.6–5)
PROSTATE SPECIFIC ANTIGEN: 0.96 NG/ML (ref 0–4)
RBC # BLD: 5.05 M/UL (ref 4.5–5.5)
SEGMENTED NEUTROPHILS RELATIVE PERCENT: 61 % (ref 40–74)
SODIUM BLD-SCNC: 139 MEQ/L (ref 135–145)
TOTAL PROTEIN: 7.1 G/DL (ref 5.9–7.8)
WBC # BLD: 9.4 K/UL (ref 4.5–11)

## 2022-10-19 ENCOUNTER — OFFICE VISIT (OUTPATIENT)
Dept: FAMILY MEDICINE CLINIC | Age: 67
End: 2022-10-19
Payer: MEDICARE

## 2022-10-19 VITALS
HEIGHT: 72 IN | OXYGEN SATURATION: 95 % | BODY MASS INDEX: 33.46 KG/M2 | TEMPERATURE: 97.5 F | DIASTOLIC BLOOD PRESSURE: 68 MMHG | SYSTOLIC BLOOD PRESSURE: 136 MMHG | WEIGHT: 247 LBS | HEART RATE: 68 BPM

## 2022-10-19 VITALS
DIASTOLIC BLOOD PRESSURE: 68 MMHG | TEMPERATURE: 97.5 F | SYSTOLIC BLOOD PRESSURE: 136 MMHG | WEIGHT: 247 LBS | OXYGEN SATURATION: 95 % | HEART RATE: 68 BPM | BODY MASS INDEX: 33.46 KG/M2 | HEIGHT: 72 IN

## 2022-10-19 DIAGNOSIS — I25.118 CORONARY ARTERY DISEASE OF NATIVE ARTERY OF NATIVE HEART WITH STABLE ANGINA PECTORIS (HCC): ICD-10-CM

## 2022-10-19 DIAGNOSIS — Z00.00 MEDICARE ANNUAL WELLNESS VISIT, SUBSEQUENT: Primary | ICD-10-CM

## 2022-10-19 DIAGNOSIS — M62.830 SPASM OF MUSCLE OF LOWER BACK: ICD-10-CM

## 2022-10-19 DIAGNOSIS — E78.2 MIXED HYPERLIPIDEMIA: ICD-10-CM

## 2022-10-19 DIAGNOSIS — Z79.4 DIABETES MELLITUS TYPE 2, INSULIN DEPENDENT (HCC): ICD-10-CM

## 2022-10-19 DIAGNOSIS — F41.9 ANXIETY: ICD-10-CM

## 2022-10-19 DIAGNOSIS — Z00.00 INITIAL MEDICARE ANNUAL WELLNESS VISIT: ICD-10-CM

## 2022-10-19 DIAGNOSIS — K51.90 ULCERATIVE COLITIS WITHOUT COMPLICATIONS, UNSPECIFIED LOCATION (HCC): ICD-10-CM

## 2022-10-19 DIAGNOSIS — K21.9 GASTROESOPHAGEAL REFLUX DISEASE WITHOUT ESOPHAGITIS: ICD-10-CM

## 2022-10-19 DIAGNOSIS — E11.9 DIABETES MELLITUS TYPE 2, INSULIN DEPENDENT (HCC): ICD-10-CM

## 2022-10-19 DIAGNOSIS — I10 ESSENTIAL HYPERTENSION: ICD-10-CM

## 2022-10-19 PROCEDURE — G8427 DOCREV CUR MEDS BY ELIG CLIN: HCPCS | Performed by: INTERNAL MEDICINE

## 2022-10-19 PROCEDURE — 2022F DILAT RTA XM EVC RTNOPTHY: CPT | Performed by: INTERNAL MEDICINE

## 2022-10-19 PROCEDURE — 1036F TOBACCO NON-USER: CPT | Performed by: INTERNAL MEDICINE

## 2022-10-19 PROCEDURE — 1123F ACP DISCUSS/DSCN MKR DOCD: CPT | Performed by: INTERNAL MEDICINE

## 2022-10-19 PROCEDURE — G0438 PPPS, INITIAL VISIT: HCPCS | Performed by: INTERNAL MEDICINE

## 2022-10-19 PROCEDURE — 99214 OFFICE O/P EST MOD 30 MIN: CPT | Performed by: INTERNAL MEDICINE

## 2022-10-19 PROCEDURE — G8484 FLU IMMUNIZE NO ADMIN: HCPCS | Performed by: INTERNAL MEDICINE

## 2022-10-19 PROCEDURE — 3044F HG A1C LEVEL LT 7.0%: CPT | Performed by: INTERNAL MEDICINE

## 2022-10-19 PROCEDURE — G8417 CALC BMI ABV UP PARAM F/U: HCPCS | Performed by: INTERNAL MEDICINE

## 2022-10-19 PROCEDURE — 3017F COLORECTAL CA SCREEN DOC REV: CPT | Performed by: INTERNAL MEDICINE

## 2022-10-19 RX ORDER — ATORVASTATIN CALCIUM 40 MG/1
40 TABLET, FILM COATED ORAL DAILY
Qty: 90 TABLET | Refills: 1 | Status: SHIPPED | OUTPATIENT
Start: 2022-10-19

## 2022-10-19 RX ORDER — PREDNISONE 2.5 MG
2.5 TABLET ORAL DAILY
Qty: 90 TABLET | Refills: 1 | Status: SHIPPED | OUTPATIENT
Start: 2022-10-19

## 2022-10-19 RX ORDER — MESALAMINE 1.2 G/1
1.2 TABLET, DELAYED RELEASE ORAL 3 TIMES DAILY
Qty: 270 TABLET | Refills: 1 | Status: SHIPPED | OUTPATIENT
Start: 2022-10-19

## 2022-10-19 RX ORDER — ALENDRONATE SODIUM 70 MG/1
70 TABLET ORAL
Qty: 12 TABLET | Refills: 1 | Status: SHIPPED | OUTPATIENT
Start: 2022-10-19

## 2022-10-19 RX ORDER — AMLODIPINE BESYLATE 10 MG/1
10 TABLET ORAL DAILY
Qty: 90 TABLET | Refills: 1 | Status: SHIPPED | OUTPATIENT
Start: 2022-10-19

## 2022-10-19 RX ORDER — PANTOPRAZOLE SODIUM 40 MG/1
40 TABLET, DELAYED RELEASE ORAL DAILY
Qty: 90 TABLET | Refills: 1 | Status: SHIPPED | OUTPATIENT
Start: 2022-10-19

## 2022-10-19 RX ORDER — FENOFIBRATE 145 MG/1
145 TABLET, COATED ORAL DAILY
Qty: 90 TABLET | Refills: 1 | Status: SHIPPED | OUTPATIENT
Start: 2022-10-19

## 2022-10-19 RX ORDER — CYCLOBENZAPRINE HCL 5 MG
5 TABLET ORAL EVERY 8 HOURS PRN
Qty: 90 TABLET | Refills: 1 | Status: SHIPPED | OUTPATIENT
Start: 2022-10-19

## 2022-10-19 SDOH — HEALTH STABILITY: PHYSICAL HEALTH: ON AVERAGE, HOW MANY MINUTES DO YOU ENGAGE IN EXERCISE AT THIS LEVEL?: 0 MIN

## 2022-10-19 SDOH — HEALTH STABILITY: PHYSICAL HEALTH: ON AVERAGE, HOW MANY DAYS PER WEEK DO YOU ENGAGE IN MODERATE TO STRENUOUS EXERCISE (LIKE A BRISK WALK)?: 0 DAYS

## 2022-10-19 ASSESSMENT — LIFESTYLE VARIABLES
HAS A RELATIVE, FRIEND, DOCTOR, OR ANOTHER HEALTH PROFESSIONAL EXPRESSED CONCERN ABOUT YOUR DRINKING OR SUGGESTED YOU CUT DOWN: NO
HOW OFTEN DURING THE LAST YEAR HAVE YOU FAILED TO DO WHAT WAS NORMALLY EXPECTED FROM YOU BECAUSE OF DRINKING: NEVER
HOW OFTEN DO YOU HAVE A DRINK CONTAINING ALCOHOL: 4 OR MORE TIMES A WEEK
HOW MANY STANDARD DRINKS CONTAINING ALCOHOL DO YOU HAVE ON A TYPICAL DAY: 1
HOW OFTEN DO YOU HAVE A DRINK CONTAINING ALCOHOL: 5
HAVE YOU OR SOMEONE ELSE BEEN INJURED AS A RESULT OF YOUR DRINKING: 0
HOW OFTEN DURING THE LAST YEAR HAVE YOU HAD A FEELING OF GUILT OR REMORSE AFTER DRINKING: 0
HOW OFTEN DO YOU HAVE SIX OR MORE DRINKS ON ONE OCCASION: 3
HOW MANY STANDARD DRINKS CONTAINING ALCOHOL DO YOU HAVE ON A TYPICAL DAY: 1 OR 2
HOW OFTEN DURING THE LAST YEAR HAVE YOU NEEDED AN ALCOHOLIC DRINK FIRST THING IN THE MORNING TO GET YOURSELF GOING AFTER A NIGHT OF HEAVY DRINKING: 0
HOW OFTEN DURING THE LAST YEAR HAVE YOU FOUND THAT YOU WERE NOT ABLE TO STOP DRINKING ONCE YOU HAD STARTED: 0
HAS A RELATIVE, FRIEND, DOCTOR, OR ANOTHER HEALTH PROFESSIONAL EXPRESSED CONCERN ABOUT YOUR DRINKING OR SUGGESTED YOU CUT DOWN: 0
HOW OFTEN DURING THE LAST YEAR HAVE YOU NEEDED AN ALCOHOLIC DRINK FIRST THING IN THE MORNING TO GET YOURSELF GOING AFTER A NIGHT OF HEAVY DRINKING: NEVER
HAVE YOU OR SOMEONE ELSE BEEN INJURED AS A RESULT OF YOUR DRINKING: NO
HOW OFTEN DURING THE LAST YEAR HAVE YOU FOUND THAT YOU WERE NOT ABLE TO STOP DRINKING ONCE YOU HAD STARTED: NEVER
HOW OFTEN DURING THE LAST YEAR HAVE YOU BEEN UNABLE TO REMEMBER WHAT HAPPENED THE NIGHT BEFORE BECAUSE YOU HAD BEEN DRINKING: NEVER
HOW OFTEN DURING THE LAST YEAR HAVE YOU HAD A FEELING OF GUILT OR REMORSE AFTER DRINKING: NEVER
HOW OFTEN DURING THE LAST YEAR HAVE YOU BEEN UNABLE TO REMEMBER WHAT HAPPENED THE NIGHT BEFORE BECAUSE YOU HAD BEEN DRINKING: 0
HOW OFTEN DURING THE LAST YEAR HAVE YOU FAILED TO DO WHAT WAS NORMALLY EXPECTED FROM YOU BECAUSE OF DRINKING: 0

## 2022-10-19 ASSESSMENT — PATIENT HEALTH QUESTIONNAIRE - PHQ9
SUM OF ALL RESPONSES TO PHQ QUESTIONS 1-9: 0
2. FEELING DOWN, DEPRESSED OR HOPELESS: 0
SUM OF ALL RESPONSES TO PHQ9 QUESTIONS 1 & 2: 0
SUM OF ALL RESPONSES TO PHQ QUESTIONS 1-9: 0
1. LITTLE INTEREST OR PLEASURE IN DOING THINGS: 0
SUM OF ALL RESPONSES TO PHQ QUESTIONS 1-9: 0
SUM OF ALL RESPONSES TO PHQ QUESTIONS 1-9: 0

## 2022-10-19 NOTE — PATIENT INSTRUCTIONS
Personalized Preventive Plan for Jesus Persaud - 10/19/2022  Medicare offers a range of preventive health benefits. Some of the tests and screenings are paid in full while other may be subject to a deductible, co-insurance, and/or copay. Some of these benefits include a comprehensive review of your medical history including lifestyle, illnesses that may run in your family, and various assessments and screenings as appropriate. After reviewing your medical record and screening and assessments performed today your provider may have ordered immunizations, labs, imaging, and/or referrals for you. A list of these orders (if applicable) as well as your Preventive Care list are included within your After Visit Summary for your review. Other Preventive Recommendations:    A preventive eye exam performed by an eye specialist is recommended every 1-2 years to screen for glaucoma; cataracts, macular degeneration, and other eye disorders. A preventive dental visit is recommended every 6 months. Try to get at least 150 minutes of exercise per week or 10,000 steps per day on a pedometer . Order or download the FREE \"Exercise & Physical Activity: Your Everyday Guide\" from [employer]. Call 7-561.294.6266 or search [employer] online. You need 6618-6063 mg of calcium and 5100-0098 IU of vitamin D per day. It is possible to meet your calcium requirement with diet alone, but a vitamin D supplement is usually necessary to meet this goal.  When exposed to the sun, use a sunscreen that protects against both UVA and UVB radiation with an SPF of 30 or greater. Reapply every 2 to 3 hours or after sweating, drying off with a towel, or swimming. Always wear a seat belt when traveling in a car. Always wear a helmet when riding a bicycle or motorcycle.

## 2022-10-19 NOTE — PROGRESS NOTES
Medicare Annual Wellness Visit    Anna Kemp is here for Medicare AWV    Assessment & Plan   Medicare annual wellness visit, subsequent    Recommendations for Preventive Services Due: see orders and patient instructions/AVS.  Recommended screening schedule for the next 5-10 years is provided to the patient in written form: see Patient Instructions/AVS.     No follow-ups on file. Subjective   The following acute and/or chronic problems were also addressed today:  Patient was seen today for an additional regular visit to address his chronic medical problems. Patient's complete Health Risk Assessment and screening values have been reviewed and are found in Flowsheets. The following problems were reviewed today and where indicated follow up appointments were made and/or referrals ordered. Positive Risk Factor Screenings with Interventions:        Alcohol Screening:  Alcohol Use: Heavy Drinker    Frequency of Alcohol Consumption: 4 or more times a week    Average Number of Drinks: 1 or 2    Frequency of Binge Drinking: Monthly     AUDIT-C Score: 6  AUDIT Total Score: 6  An AUDIT-C score of 3-7 indicates potential alcohol risk. An AUDIT Total score of 8 or more is associated with harmful or hazardous drinking. A score of 13 or more in women, and 15 or more in men, is likely to indicate alcohol dependence.   Substance Use - Alcohol Interventions:  Reviewed recommended alcohol consumption guidelines with the patient        General Health and ACP:  General  In general, how would you say your health is?: Fair  In the past 7 days, have you experienced any of the following: New or Increased Pain, New or Increased Fatigue, Loneliness, Social Isolation, Stress or Anger?: No  Do you get the social and emotional support that you need?: Yes  Do you have a Living Will?: (!) No    Advance Directives       Power of  Living Will ACP-Advance Directive ACP-Power of     Not on File Filed on 04/06/17 Filed Not on File        General Health Risk Interventions:  Patient declines living well at this time. Health Habits/Nutrition:  Physical Activity: Inactive    Days of Exercise per Week: 0 days    Minutes of Exercise per Session: 0 min     Have you lost any weight without trying in the past 3 months?: No  Body mass index: (!) 33.5  Have you seen the dentist within the past year?: Yes  Health Habits/Nutrition Interventions:  No current issues found             Objective   Vitals:    10/19/22 0709   BP: 136/68   Pulse: 68   Temp: 97.5 °F (36.4 °C)   TempSrc: Temporal   SpO2: 95%   Weight: 247 lb (112 kg)   Height: 6' (1.829 m)      Body mass index is 33.5 kg/m². Allergies   Allergen Reactions    Lisinopril Swelling     Tongue swells     Ace Inhibitors     Amino Acids      Prior to Visit Medications    Medication Sig Taking?  Authorizing Provider   alendronate (FOSAMAX) 70 MG tablet Take 1 tablet by mouth every 7 days  Mj Ordoñez MD   amLODIPine (NORVASC) 10 MG tablet Take 1 tablet by mouth daily  Mj Ordoñez MD   atorvastatin (LIPITOR) 40 MG tablet Take 1 tablet by mouth daily  Mj Ordoñez MD   fenofibrate (TRICOR) 145 MG tablet Take 1 tablet by mouth daily  Mj Ordoñez MD   mesalamine (LIALDA) 1.2 g EC tablet Take 1 tablet by mouth 3 times daily  Mj Ordoñez MD   metFORMIN (GLUCOPHAGE) 500 MG tablet Take 2 tablets by mouth 2 times daily (with meals)  Mj Ordoñez MD   metoprolol tartrate (LOPRESSOR) 25 MG tablet Take 1 tablet by mouth 2 times daily  Mj Ordoñez MD   pantoprazole (PROTONIX) 40 MG tablet Take 1 tablet by mouth daily  Mj Ordoñez MD   predniSONE (DELTASONE) 2.5 MG tablet Take 1 tablet by mouth daily  Mj Ordoñez MD   sertraline (ZOLOFT) 50 MG tablet TAKE 1 TABLET DAILY  Mj Ordoñez MD   cyclobenzaprine (FLEXERIL) 5 MG tablet Take 1 tablet by mouth every 8 hours as needed for Muscle spasms (in the low back)  Mj Ordoñez MD   ammonium lactate (LAC-HYDRIN) 12 % lotion Apply topically twice daily  Alejos Rinne Narducci, DPM   terbinafine (LAMISIL) 1 % cream Apply affected area topically 2 times daily.   Angelina Parker DPM   naproxen (NAPROSYN) 500 MG tablet take 1 tablet by mouth twice a day if needed for pain  Meghan Holder MD   blood glucose test strips (ONETOUCH ULTRA) strip TEST 4 TIMES DAILY  Meghan Holder MD   insulin lispro, 1 Unit Dial, 100 UNIT/ML SOPN Adjust units accordingly as needed  Historical Provider, MD   insulin glargine (LANTUS) 100 UNIT/ML injection vial Inject into the skin every morning 62 units  Historical Provider, MD   OneTouch Delica Lancets 95R MISC 1 Device by Other route 4 times daily  Meghan Holder MD   vitamin D (ERGOCALCIFEROL) 1.25 MG (02935 UT) CAPS capsule Take 1 capsule by mouth every 30 days  Meghan Holder MD   vitamin C (ASCORBIC ACID) 500 MG tablet Take 1 tablet by mouth daily  Meghan Holder MD   Multiple Vitamin (MULTIVITAMIN) tablet Take 1 tablet by mouth daily Dispense tab-a-cathy if available  Meghan Holder MD   Elastic Bandages & Supports ([facility]) [address] 1 each by Does not apply route daily as needed (edema) JOBST STOCKINGS  20-30MG MERCURY  Meghan Holder MD   empagliflozin (JARDIANCE) 25 MG tablet Take 25 mg by mouth daily  Historical Provider, MD   Omega-3 Fatty Acids (FISH OIL) 1000 MG CAPS Take 3 capsules by mouth daily  Patient taking differently: Take 1,000 mg by mouth daily  Meghan Holder MD   azaTHIOprine (IMURAN) 50 MG tablet take 1 tablet by mouth three times a day  Anai Stevenson DO   Insulin Pen Needle 31G X 8 MM MISC For injections of insulin 4 times a day  Anai Stevenson DO   dulaglutide (TRULICITY) 1.5 JH/9.6TO SC injection Inject into the skin  Historical Provider, MD   aspirin 81 MG tablet Take 81 mg by mouth daily  Historical Provider, MD   insulin glargine (TOUJEO SOLOSTAR) 300 UNIT/ML injection pen Inject into the skin once a week   Historical Provider, MD       CareTeam (Including outside providers/suppliers regularly involved in providing care):   Patient Care Team:  Galileo Lyle MD as PCP - General (Internal Medicine)  Galileo Lyle MD as PCP - REHABILITATION [facility] Empaneled Provider     Reviewed and updated this visit:

## 2022-10-19 NOTE — PROGRESS NOTES
[address] [place]     10/19/22  Soni Estrada : 1955 Sex: male  Age: 79 y.o. Chief Complaint   Patient presents with    Hypertension     4 months    Hyperlipidemia       HPI  Presents today for 4-month follow-up visit on his medical problems. Reviewed last couple notes. I did review note from pharmacy related to his diabetes. Last A1c 6.8 which did bump a little bit. States that sugars at home have been in a good range. Blood pressure stable controlled on antihypertensive medication. Lipids have been stable and controlled on a statin and fibrate medications. Anxiety has been stable on his Zoloft. He is doing well on his bisphosphonate for his elevated FRAX score/osteopenia and is tolerating this. Ulcerative colitis has been stable on his current medications. CAD has been stable and following with cardiology. Does not wear CPAP secondary to claustrophobia. He does continue to vape. Patient never did see dermatology for his atypical nevi that I sent him for nor has he had low-dose CAT scan done yet. I told him this is his responsibility at this point. To notify me if there are issues with scheduling. He continues to follow-up with pharmacy, cardiology, ophthalmology, urology, GI. He is now going to see podiatry today also           Review of Systems  Const: Denies chills, fever. Eyes: Denies eye symptoms States he is up to date with his eyes  ENMT: Denies ear symptoms. Reports postnasal drip, but denies other nasal symptoms. Denies  mouth or throat symptoms. CV: Denies chest pain, orthopnea and palpitations. Positive for trace edema lower extremities right greater than left  This is side that he had vein harvesting on. Resp: Denies wheezing, cough. Positive for shortness of breath-occasional with overexertion. GI: Denies diarrhea, nausea and vomiting. : Urinary: denies dysuria, frequency and frequent UTI's. Musculo: Reports arthritis.   Skin: Denies eczema, pruritus and sores.  Neuro: Denies dizziness, headache, seizures and syncope. Psych: Anxious-improved on Zoloft                REST OF PERTINENT ROS GONE OVER AND WAS NEGATIVE. PMH  Problem List: Chronic ulcerative enterocolitis, Hyperlipidemia, Type 2 diabetes mellitus, Benign essential  hypertension  Health Maintenance:  Bone Density Scan - (10/16/2014), 7/21-positive FRAX score  Colonoscopy Screening - (10/20/2014)  Colonoscopy - (2/21/2008)  Colonoscopy - (5/28/2010)  Colonoscopy - (6/29/2012)  Colonoscopy - (10/20/2014)  Influenza Vaccination - (9/11/2015)  Colonoscopy - 2/08,5/10,12/11,6/12,10/14-Dr swartz, 6/20, 6/21-due 23  Rectal Exam - 5/09,9/10,12/11,7/13-declined  PSA Test - 5/09,9/10,4/12,7/13  Prostate Exam - 5/09,9/10,12/11,7/13-declined  EKG - 5/09,11/13  EGD - Dr. Luz Maria oMrin 24  Medical Problems:  Ulcerative Colitis - follows with ismael  Hyperlipidemia  chronic right shoulder pain - ac arthritis and rotator cuff tear  olecranon septic bursitis, Hypertension, Type 2 Diabetes  Headache - cluster  microdiscectomy L4L5  Sleep Apnea - uvulectomy- can't wear mask-declined further eval  Osteopenia  microscopic hematuria - Was evaluated by urology. Diverticulosis, Proteinuria, Colon Polyps, poor compliance  CAD - CABG 2017  Barrettes Esophagitis, Fatty Liver, Kidney Stones  Follows with - Cardiology ,pharmacist, ophthalmology, GI, urology  FH:  Father:  Coronary Artery Disease (CAD), Non Insulin Dependent Diabetes. Mother:  CLL, Hypertension, Chronic Obstructive Pulmonary Disease (COPD). Sister 1:  osteopenia. Paternal Uncle 1:  Lung Cancer. SH:  . (Marital)  Personal Habits: Cigarette Use: vapes. Alcohol: Current Alcohol Use                       Current Outpatient Medications:     alendronate (FOSAMAX) 70 MG tablet, Take 1 tablet by mouth every 7 days, Disp: 12 tablet, Rfl: 1    amLODIPine (NORVASC) 10 MG tablet, Take 1 tablet by mouth daily, Disp: 90 tablet, Rfl: 1    atorvastatin (LIPITOR) 40 MG tab-a-cathy if available, Disp: 30 tablet, Rfl: 5    Elastic Bandages & Supports ([address]) MISC, 1 each by Does not apply route daily as needed (edema) JOBST STOCKINGS 20-30MG MERCURY, Disp: 1 each, Rfl: 0    empagliflozin (JARDIANCE) 25 MG tablet, Take 25 mg by mouth daily, Disp: , Rfl:     Omega-3 Fatty Acids (FISH OIL) 1000 MG CAPS, Take 3 capsules by mouth daily (Patient taking differently: Take 1,000 mg by mouth daily), Disp: 90 capsule, Rfl: 6    azaTHIOprine (IMURAN) 50 MG tablet, take 1 tablet by mouth three times a day, Disp: 90 tablet, Rfl: 3    Insulin Pen Needle 31G X 8 MM MISC, For injections of insulin 4 times a day, Disp: 120 each, Rfl: 5    dulaglutide (TRULICITY) 1.5 IU/5.6OM SC injection, Inject into the skin, Disp: , Rfl:     aspirin 81 MG tablet, Take 81 mg by mouth daily, Disp: , Rfl:     insulin glargine (TOUJEO SOLOSTAR) 300 UNIT/ML injection pen, Inject into the skin once a week , Disp: , Rfl:   Allergies   Allergen Reactions    Lisinopril Swelling     Tongue swells     Ace Inhibitors     Amino Acids        Past Medical History:   Diagnosis Date    Arthritis     Colitis     Colon polyps     Coronary artery disease of native artery of native heart with stable angina pectoris (HCC)     Diabetes mellitus (HCC)     Diverticulosis     Headache     Hyperlipidemia     Hypertension     Osteopenia     Sleep apnea      Past Surgical History:   Procedure Laterality Date    APPENDECTOMY      BACK SURGERY      CARDIAC CATHETERIZATION  03/29/2017    Dr. Brad Mcclure    COLONOSCOPY      CORONARY ARTERY BYPASS GRAFT  04/06/2017    x2    ENDOSCOPY, COLON, DIAGNOSTIC      FRACTURE SURGERY      wrist    FRACTURE SURGERY      left tibia    FRACTURE SURGERY      fingers    FRACTURE SURGERY      collar bone    UVULECTOMY       Family History   Problem Relation Age of Onset    Asthma Mother     Diabetes Father     Heart Disease Father     Coronary Art Dis Father      Social History     Socioeconomic History    Marital status:      Spouse name: Not on file    Number of children: Not on file    Years of education: Not on file    Highest education level: Not on file   Occupational History    Not on file   Tobacco Use    Smoking status: Former     Packs/day: 1.50     Years: 35.00     Pack years: 52.50     Types: E-Cigarettes, Cigarettes     Quit date: 2015     Years since quittin.8    Smokeless tobacco: Never    Tobacco comments:     currently uses vapor cigarettes   Substance and Sexual Activity    Alcohol use: Yes     Alcohol/week: 10.0 standard drinks     Types: 10 Cans of beer per week     Comment: occasionally    Drug use: No    Sexual activity: Not on file   Other Topics Concern    Not on file   Social History Narrative    Not on file     Social Determinants of Health     Financial Resource Strain: Low Risk     Difficulty of Paying Living Expenses: Not hard at all   Food Insecurity: No Food Insecurity    Worried About Running Out of Food in the Last Year: Never true    Ran Out of Food in the Last Year: Never true   Transportation Needs: Not on file   Physical Activity: Inactive    Days of Exercise per Week: 0 days    Minutes of Exercise per Session: 0 min   Stress: Not on file   Social Connections: Not on file   Intimate Partner Violence: Not on file   Housing Stability: Not on file       Vitals:    10/19/22 0652   BP: 136/68   Pulse: 68   Temp: 97.5 °F (36.4 °C)   TempSrc: Temporal   SpO2: 95%   Weight: 247 lb (112 kg)   Height: 6' (1.829 m)       Physical Exam    Constitutional: He is oriented to person, place, and time. He appears well-developed and well-nourished. No distress. Overweight   Neck: Normal range of motion. Neck supple. No JVD present. No thyromegaly present. Cardiovascular: Normal rate, regular rhythm, normal heart sounds and intact distal pulses. Exam reveals no gallop and no friction rub. No murmur heard.   Mid sternotomy scar from his CABG   Pulmonary/Chest: Effort daily  -     predniSONE (DELTASONE) 2.5 MG tablet; Take 1 tablet by mouth daily  Stable controlled on medication follows with GI      Coronary artery disease of native artery of native heart with stable angina pectoris (HCC)  -     metoprolol tartrate (LOPRESSOR) 25 MG tablet; Take 1 tablet by mouth 2 times daily  Stable controlled follows with cardiology    Gastroesophageal reflux disease without esophagitis  -     pantoprazole (PROTONIX) 40 MG tablet; Take 1 tablet by mouth daily    Anxiety  -     sertraline (ZOLOFT) 50 MG tablet; TAKE 1 TABLET DAILY  Stable and controlled on SSRI    Spasm of muscle of lower back  -     cyclobenzaprine (FLEXERIL) 5 MG tablet; Take 1 tablet by mouth every 8 hours as needed for Muscle spasms (in the low back)    Other orders  -     alendronate (FOSAMAX) 70 MG tablet; Take 1 tablet by mouth every 7 days    Plan: We will set [facility] up with new physician for 4-month follow-up visit and establish. Blood work set up before next visit to monitor disease progression and medication use. Prescription management performed after review of efficacy of medication on his current medical problems. Follow-up with above consultants. Notify us of problems in the interim. Return in about 4 months (around 2/19/2023). Seen By:  Radhika Almazan MD      *Document was created using voice recognition software. Note was reviewed however may contain grammatical errors.

## 2022-12-09 LAB
AVERAGE GLUCOSE: NORMAL
HBA1C MFR BLD: 5.9 %

## 2023-02-23 ENCOUNTER — OFFICE VISIT (OUTPATIENT)
Dept: FAMILY MEDICINE CLINIC | Age: 68
End: 2023-02-23

## 2023-02-23 VITALS
TEMPERATURE: 97.5 F | BODY MASS INDEX: 33.46 KG/M2 | HEART RATE: 77 BPM | HEIGHT: 72 IN | WEIGHT: 247 LBS | OXYGEN SATURATION: 95 % | SYSTOLIC BLOOD PRESSURE: 146 MMHG | DIASTOLIC BLOOD PRESSURE: 62 MMHG

## 2023-02-23 DIAGNOSIS — K21.9 GASTROESOPHAGEAL REFLUX DISEASE WITHOUT ESOPHAGITIS: ICD-10-CM

## 2023-02-23 DIAGNOSIS — F41.9 ANXIETY: ICD-10-CM

## 2023-02-23 DIAGNOSIS — I25.118 CORONARY ARTERY DISEASE OF NATIVE ARTERY OF NATIVE HEART WITH STABLE ANGINA PECTORIS (HCC): ICD-10-CM

## 2023-02-23 DIAGNOSIS — E78.2 MIXED HYPERLIPIDEMIA: ICD-10-CM

## 2023-02-23 DIAGNOSIS — K51.90 ULCERATIVE COLITIS WITHOUT COMPLICATIONS, UNSPECIFIED LOCATION (HCC): ICD-10-CM

## 2023-02-23 DIAGNOSIS — E11.9 DIABETES MELLITUS TYPE 2, INSULIN DEPENDENT (HCC): ICD-10-CM

## 2023-02-23 DIAGNOSIS — I10 ESSENTIAL HYPERTENSION: ICD-10-CM

## 2023-02-23 DIAGNOSIS — Z79.4 DIABETES MELLITUS TYPE 2, INSULIN DEPENDENT (HCC): ICD-10-CM

## 2023-02-23 DIAGNOSIS — M62.830 SPASM OF MUSCLE OF LOWER BACK: ICD-10-CM

## 2023-02-23 RX ORDER — AMLODIPINE BESYLATE 10 MG/1
10 TABLET ORAL DAILY
Qty: 90 TABLET | Refills: 1 | Status: SHIPPED | OUTPATIENT
Start: 2023-02-23

## 2023-02-23 RX ORDER — PANTOPRAZOLE SODIUM 40 MG/1
40 TABLET, DELAYED RELEASE ORAL DAILY
Qty: 90 TABLET | Refills: 1 | Status: SHIPPED | OUTPATIENT
Start: 2023-02-23

## 2023-02-23 RX ORDER — ALENDRONATE SODIUM 70 MG/1
70 TABLET ORAL
Qty: 12 TABLET | Refills: 1 | Status: SHIPPED | OUTPATIENT
Start: 2023-02-23

## 2023-02-23 RX ORDER — CYCLOBENZAPRINE HCL 5 MG
5 TABLET ORAL EVERY 8 HOURS PRN
Qty: 90 TABLET | Refills: 1 | Status: SHIPPED | OUTPATIENT
Start: 2023-02-23

## 2023-02-23 RX ORDER — MESALAMINE 1.2 G/1
1.2 TABLET, DELAYED RELEASE ORAL 3 TIMES DAILY
Qty: 270 TABLET | Refills: 1 | Status: SHIPPED | OUTPATIENT
Start: 2023-02-23

## 2023-02-23 RX ORDER — ATORVASTATIN CALCIUM 40 MG/1
40 TABLET, FILM COATED ORAL DAILY
Qty: 90 TABLET | Refills: 1 | Status: SHIPPED | OUTPATIENT
Start: 2023-02-23

## 2023-02-23 RX ORDER — FENOFIBRATE 145 MG/1
145 TABLET, COATED ORAL DAILY
Qty: 90 TABLET | Refills: 1 | Status: SHIPPED | OUTPATIENT
Start: 2023-02-23

## 2023-02-23 RX ORDER — PREDNISONE 2.5 MG
2.5 TABLET ORAL DAILY
Qty: 90 TABLET | Refills: 1 | Status: SHIPPED | OUTPATIENT
Start: 2023-02-23

## 2023-02-23 SDOH — ECONOMIC STABILITY: FOOD INSECURITY: WITHIN THE PAST 12 MONTHS, THE FOOD YOU BOUGHT JUST DIDN'T LAST AND YOU DIDN'T HAVE MONEY TO GET MORE.: NEVER TRUE

## 2023-02-23 SDOH — ECONOMIC STABILITY: INCOME INSECURITY: HOW HARD IS IT FOR YOU TO PAY FOR THE VERY BASICS LIKE FOOD, HOUSING, MEDICAL CARE, AND HEATING?: NOT HARD AT ALL

## 2023-02-23 SDOH — ECONOMIC STABILITY: FOOD INSECURITY: WITHIN THE PAST 12 MONTHS, YOU WORRIED THAT YOUR FOOD WOULD RUN OUT BEFORE YOU GOT MONEY TO BUY MORE.: NEVER TRUE

## 2023-02-23 SDOH — ECONOMIC STABILITY: HOUSING INSECURITY
IN THE LAST 12 MONTHS, WAS THERE A TIME WHEN YOU DID NOT HAVE A STEADY PLACE TO SLEEP OR SLEPT IN A SHELTER (INCLUDING NOW)?: NO

## 2023-02-23 ASSESSMENT — ENCOUNTER SYMPTOMS
DIARRHEA: 0
SHORTNESS OF BREATH: 0
CONSTIPATION: 0
COLOR CHANGE: 0
ABDOMINAL DISTENTION: 0
BLOOD IN STOOL: 0
APNEA: 0
FACIAL SWELLING: 0
SINUS PAIN: 0
VOMITING: 0
SINUS PRESSURE: 0
PHOTOPHOBIA: 0
CHEST TIGHTNESS: 0
COUGH: 0
RHINORRHEA: 0

## 2023-02-23 ASSESSMENT — PATIENT HEALTH QUESTIONNAIRE - PHQ9
SUM OF ALL RESPONSES TO PHQ QUESTIONS 1-9: 0
2. FEELING DOWN, DEPRESSED OR HOPELESS: 0
1. LITTLE INTEREST OR PLEASURE IN DOING THINGS: 0
SUM OF ALL RESPONSES TO PHQ9 QUESTIONS 1 & 2: 0
SUM OF ALL RESPONSES TO PHQ QUESTIONS 1-9: 0

## 2023-02-23 NOTE — PROGRESS NOTES
Gilberto Head is a 79 y.o. male accompanied by himself. CC:   Chief Complaint   Patient presents with    Hypertension     New to provider, former Dr Damaris Barfield patient; 4 months       Hypertension:  Patient is here for follow up chronic hypertension. This is  generally controlled on current medication regimen. BP today is 166/68. Takes meds as directed and tolerates them well. Most recent labs reviewed with patient and are not remarkable. No symptoms from htn standpoint per ROS. Patient is not compliant with lifestyle modifications. Patient does not smoke. Comorbid conditions include obesity, diabetes, hyperlipidemia. Hyperlipidemia:  Patient is here to follow up regarding chronic hyperlipidemia. This is  generally controlled. Treatment includes a statin. Patient is not compliant with lifestyle modifications. Patient is not a smoker. Most recent labs reviewed with patient today and are not remarkable. Comorbid conditions include obesity, htn, diabetes. Lab Results   Component Value Date    LDLCALC 46 06/26/2020    LDLCHOLESTEROL 61 05/20/2022     DM2:   Managed by the [facility] meds clinic. Doing well. A1c controlled. We will follow along. Will request to have records. GERD:   Well controlled with the PPI  We will continue to follow      UC:   Sees gastroenterology. On several medications for this. Well-controlled. We will continue to follow along. Patient's past medical, surgical, social and/or family history reviewed, updated in chart, and are non-contributory (unless otherwise stated). Medications and allergies also reviewed and updated in chart. BP (!) 166/68   Pulse 77   Temp 97.5 °F (36.4 °C) (Temporal)   Ht 6' (1.829 m)   Wt 247 lb (112 kg)   SpO2 95%   BMI 33.50 kg/m²     Review of Systems   Constitutional:  Negative for chills, fatigue and fever. HENT:  Negative for congestion, ear pain, facial swelling, rhinorrhea, sinus pressure and sinus pain. Eyes:  Negative for photophobia and visual disturbance. Respiratory:  Negative for apnea, cough, chest tightness and shortness of breath. Cardiovascular:  Negative for chest pain and palpitations. Gastrointestinal:  Negative for abdominal distention, blood in stool, constipation, diarrhea and vomiting. Endocrine: Negative for cold intolerance, polydipsia, polyphagia and polyuria. Genitourinary:  Negative for decreased urine volume, frequency and urgency. Musculoskeletal:  Negative for arthralgias and gait problem. Skin:  Negative for color change. Allergic/Immunologic: Negative for environmental allergies and food allergies. Neurological:  Negative for dizziness, light-headedness and headaches. Hematological:  Negative for adenopathy. Psychiatric/Behavioral:  Negative for agitation and confusion. Physical Exam  Constitutional:       Appearance: Normal appearance. HENT:      Head: Normocephalic and atraumatic. Right Ear: External ear normal.      Left Ear: External ear normal.      Nose: Nose normal. No congestion or rhinorrhea. Eyes:      Extraocular Movements: Extraocular movements intact. Pupils: Pupils are equal, round, and reactive to light. Cardiovascular:      Rate and Rhythm: Normal rate and regular rhythm. Heart sounds: No murmur heard. No friction rub. No gallop. Pulmonary:      Effort: Pulmonary effort is normal.      Breath sounds: Normal breath sounds. Chest:      Chest wall: No tenderness. Abdominal:      General: Abdomen is flat. Bowel sounds are normal. There is no distension. Palpations: Abdomen is soft. Tenderness: There is no abdominal tenderness. Musculoskeletal:         General: Normal range of motion. Cervical back: Normal range of motion. Skin:     General: Skin is warm and dry. Neurological:      General: No focal deficit present. Mental Status: He is alert and oriented to person, place, and time. Psychiatric:         Mood and Affect: Mood normal.       Assessment:  Tesha Burnett was seen today for hypertension. Diagnoses and all orders for this visit:    Essential hypertension  -     amLODIPine (NORVASC) 10 MG tablet; Take 1 tablet by mouth daily    Mixed hyperlipidemia  -     atorvastatin (LIPITOR) 40 MG tablet; Take 1 tablet by mouth daily  -     fenofibrate (TRICOR) 145 MG tablet; Take 1 tablet by mouth daily    Spasm of muscle of lower back  -     cyclobenzaprine (FLEXERIL) 5 MG tablet; Take 1 tablet by mouth every 8 hours as needed for Muscle spasms (in the low back)    Ulcerative colitis without complications, unspecified location ([facility])  -     mesalamine (LIALDA) 1.2 g EC tablet; Take 1 tablet by mouth 3 times daily  -     predniSONE (DELTASONE) 2.5 MG tablet; Take 1 tablet by mouth daily    Diabetes mellitus type 2, insulin dependent ([facility])  -     metFORMIN (GLUCOPHAGE) 500 MG tablet; Take 2 tablets by mouth 2 times daily (with meals)    Coronary artery disease of native artery of native heart with stable angina pectoris ([facility])  -     metoprolol tartrate (LOPRESSOR) 25 MG tablet; Take 1 tablet by mouth 2 times daily    Gastroesophageal reflux disease without esophagitis  -     pantoprazole (PROTONIX) 40 MG tablet; Take 1 tablet by mouth daily    Anxiety  -     sertraline (ZOLOFT) 50 MG tablet; TAKE 1 TABLET DAILY    Other orders  -     alendronate (FOSAMAX) 70 MG tablet; Take 1 tablet by mouth every 7 days      Significant comorbidities. Most of which were actually discussed today. The patient does have some Veliz's esophagus from his gastroesophageal reflux disease although no reason for him to continue his Protonix. His anxiety is well controlled with Zoloft. Really not having any problems. As far as the patient's diabetes it is well controlled by the [facility] meds clinic. I will ask for the records and continue to follow along with them.       The patient's hypertension today in office is little elevated. However with his average readings at home being perfectly normal or even low normal I just believe that this may be due to in office new doctor patient relationship anxiety. I will have my nurse recheck it. I will also have the patient bring in his machine next time. Follow Up:  No follow-ups on file. As above. Call or go to ED immediately if symptoms worsen or persist.  No follow-ups on file. , or sooner if necessary. Educational materials and/or home exercises printed for patient's review and were included in patient instructions on his/her After Visit Summary and given to patient at the end ofvisit. Counseled regarding above diagnosis, including possible risks and complications,  especially if left uncontrolled. Counseledregarding the possible side effects, risks, benefits and alternatives to treatment; patient and/or guardian verbalizes understanding, agrees, feels comfortable with and wishes to proceed with above treatment plan. patient to call with any new medication issues, and read all Rx info from pharmacy to assure aware of all possible risks and side effects of medication before taking. Reviewed age and gender appropriatehealth screening exams and vaccinations. Advised patient regarding importance of keeping up with recommended health maintenance and to schedule as soon as possible if overdue, as this is important in assessing forundiagnosed pathology, especially cancer, as well as protecting against potentially harmful/life threatening disease. Patient and/or guardian verbalizes understanding and agrees with above counseling,assessment and plan. All questions answered.

## 2023-03-10 LAB
AVERAGE GLUCOSE: NORMAL
HBA1C MFR BLD: 6.4 %

## 2023-05-09 ENCOUNTER — PATIENT MESSAGE (OUTPATIENT)
Dept: FAMILY MEDICINE CLINIC | Age: 68
End: 2023-05-09

## 2023-05-09 DIAGNOSIS — E11.9 DIABETES MELLITUS TYPE 2, INSULIN DEPENDENT (HCC): Primary | ICD-10-CM

## 2023-05-09 DIAGNOSIS — Z79.4 DIABETES MELLITUS TYPE 2, INSULIN DEPENDENT (HCC): Primary | ICD-10-CM

## 2023-05-10 NOTE — TELEPHONE ENCOUNTER
From: Curt Link  To: Dr. Lagunas Cross: 5/9/2023 2:01 PM EDT  Subject: lab work    Do I need to have any lab work done in advance of my June appointment?

## 2023-06-09 LAB
AVERAGE GLUCOSE: NORMAL
HBA1C MFR BLD: 6 %

## 2023-06-26 ENCOUNTER — OFFICE VISIT (OUTPATIENT)
Dept: FAMILY MEDICINE CLINIC | Age: 68
End: 2023-06-26
Payer: MEDICARE

## 2023-06-26 VITALS
BODY MASS INDEX: 33.35 KG/M2 | WEIGHT: 246.2 LBS | OXYGEN SATURATION: 94 % | SYSTOLIC BLOOD PRESSURE: 144 MMHG | HEART RATE: 76 BPM | TEMPERATURE: 97.6 F | HEIGHT: 72 IN | DIASTOLIC BLOOD PRESSURE: 62 MMHG

## 2023-06-26 DIAGNOSIS — E11.9 DIABETES MELLITUS TYPE 2, INSULIN DEPENDENT (HCC): ICD-10-CM

## 2023-06-26 DIAGNOSIS — Z12.5 SCREENING FOR PROSTATE CANCER: Primary | ICD-10-CM

## 2023-06-26 DIAGNOSIS — L29.9 PRURITUS: ICD-10-CM

## 2023-06-26 DIAGNOSIS — F41.9 ANXIETY: ICD-10-CM

## 2023-06-26 DIAGNOSIS — K51.90 ULCERATIVE COLITIS WITHOUT COMPLICATIONS, UNSPECIFIED LOCATION (HCC): ICD-10-CM

## 2023-06-26 DIAGNOSIS — Z79.4 DIABETES MELLITUS TYPE 2, INSULIN DEPENDENT (HCC): ICD-10-CM

## 2023-06-26 DIAGNOSIS — M62.830 SPASM OF MUSCLE OF LOWER BACK: ICD-10-CM

## 2023-06-26 DIAGNOSIS — I25.118 CORONARY ARTERY DISEASE OF NATIVE ARTERY OF NATIVE HEART WITH STABLE ANGINA PECTORIS (HCC): ICD-10-CM

## 2023-06-26 DIAGNOSIS — K21.9 GASTROESOPHAGEAL REFLUX DISEASE WITHOUT ESOPHAGITIS: ICD-10-CM

## 2023-06-26 DIAGNOSIS — E78.2 MIXED HYPERLIPIDEMIA: ICD-10-CM

## 2023-06-26 DIAGNOSIS — I10 ESSENTIAL HYPERTENSION: ICD-10-CM

## 2023-06-26 PROCEDURE — 3044F HG A1C LEVEL LT 7.0%: CPT | Performed by: FAMILY MEDICINE

## 2023-06-26 PROCEDURE — 3077F SYST BP >= 140 MM HG: CPT | Performed by: FAMILY MEDICINE

## 2023-06-26 PROCEDURE — 1123F ACP DISCUSS/DSCN MKR DOCD: CPT | Performed by: FAMILY MEDICINE

## 2023-06-26 PROCEDURE — 3078F DIAST BP <80 MM HG: CPT | Performed by: FAMILY MEDICINE

## 2023-06-26 PROCEDURE — 99214 OFFICE O/P EST MOD 30 MIN: CPT | Performed by: FAMILY MEDICINE

## 2023-06-26 RX ORDER — MESALAMINE 1.2 G/1
1.2 TABLET, DELAYED RELEASE ORAL 3 TIMES DAILY
Qty: 300 TABLET | Refills: 1 | Status: SHIPPED | OUTPATIENT
Start: 2023-06-26

## 2023-06-26 RX ORDER — CYCLOBENZAPRINE HCL 5 MG
5 TABLET ORAL EVERY 8 HOURS PRN
Qty: 100 TABLET | Refills: 1 | Status: SHIPPED | OUTPATIENT
Start: 2023-06-26

## 2023-06-26 RX ORDER — AMLODIPINE BESYLATE 10 MG/1
10 TABLET ORAL DAILY
Qty: 100 TABLET | Refills: 1 | Status: SHIPPED | OUTPATIENT
Start: 2023-06-26

## 2023-06-26 RX ORDER — FENOFIBRATE 145 MG/1
145 TABLET, COATED ORAL DAILY
Qty: 100 TABLET | Refills: 1 | Status: SHIPPED | OUTPATIENT
Start: 2023-06-26

## 2023-06-26 RX ORDER — ALENDRONATE SODIUM 70 MG/1
70 TABLET ORAL
Qty: 12 TABLET | Refills: 1 | Status: SHIPPED | OUTPATIENT
Start: 2023-06-26

## 2023-06-26 RX ORDER — PREDNISONE 2.5 MG/1
2.5 TABLET ORAL DAILY
Qty: 100 TABLET | Refills: 1 | Status: SHIPPED | OUTPATIENT
Start: 2023-06-26

## 2023-06-26 RX ORDER — PANTOPRAZOLE SODIUM 40 MG/1
40 TABLET, DELAYED RELEASE ORAL DAILY
Qty: 100 TABLET | Refills: 1 | Status: SHIPPED | OUTPATIENT
Start: 2023-06-26

## 2023-06-26 RX ORDER — ATORVASTATIN CALCIUM 40 MG/1
40 TABLET, FILM COATED ORAL DAILY
Qty: 100 TABLET | Refills: 1 | Status: SHIPPED | OUTPATIENT
Start: 2023-06-26

## 2023-06-26 ASSESSMENT — ENCOUNTER SYMPTOMS
FACIAL SWELLING: 0
SINUS PRESSURE: 0
SINUS PAIN: 0
DIARRHEA: 0
CONSTIPATION: 0
CHEST TIGHTNESS: 0
BLOOD IN STOOL: 0
COLOR CHANGE: 0
SHORTNESS OF BREATH: 0
ABDOMINAL DISTENTION: 0
APNEA: 0
PHOTOPHOBIA: 0
COUGH: 0
VOMITING: 0
RHINORRHEA: 0

## 2023-06-27 ENCOUNTER — PATIENT MESSAGE (OUTPATIENT)
Dept: FAMILY MEDICINE CLINIC | Age: 68
End: 2023-06-27

## 2023-06-27 DIAGNOSIS — K51.90 ULCERATIVE COLITIS WITHOUT COMPLICATIONS, UNSPECIFIED LOCATION (HCC): ICD-10-CM

## 2023-06-28 RX ORDER — AZATHIOPRINE 50 MG/1
50 TABLET ORAL 3 TIMES DAILY
Qty: 90 TABLET | Refills: 3 | Status: SHIPPED | OUTPATIENT
Start: 2023-06-28

## 2023-06-30 LAB
ALBUMIN SERPL-MCNC: 4 G/DL
ALP BLD-CCNC: 43 U/L
ALT SERPL-CCNC: 32 U/L
ANION GAP SERPL CALCULATED.3IONS-SCNC: 11 MMOL/L
AST SERPL-CCNC: 27 U/L
BASOPHILS ABSOLUTE: 0.1 /ΜL
BASOPHILS RELATIVE PERCENT: 0.9 %
BILIRUB SERPL-MCNC: 0.7 MG/DL (ref 0.1–1.4)
BUN BLDV-MCNC: 23 MG/DL
CALCIUM SERPL-MCNC: 9.3 MG/DL
CHLORIDE BLD-SCNC: 103 MMOL/L
CHOLESTEROL, TOTAL: 136 MG/DL
CHOLESTEROL/HDL RATIO: 1.7
CO2: 26 MMOL/L
CREAT SERPL-MCNC: 1.1 MG/DL
EGFR: 67
EOSINOPHILS ABSOLUTE: 0.4 /ΜL
EOSINOPHILS RELATIVE PERCENT: 4.4 %
GLUCOSE BLD-MCNC: 145 MG/DL
HCT VFR BLD CALC: 45.5 % (ref 41–53)
HDLC SERPL-MCNC: 43 MG/DL (ref 35–70)
HEMOGLOBIN: 14.9 G/DL (ref 13.5–17.5)
LDL CHOLESTEROL CALCULATED: 71 MG/DL (ref 0–160)
LYMPHOCYTES ABSOLUTE: 1.8 /ΜL
LYMPHOCYTES RELATIVE PERCENT: 19.7 %
MCH RBC QN AUTO: 31.1 PG
MCHC RBC AUTO-ENTMCNC: 32.7 G/DL
MCV RBC AUTO: 94.9 FL
MONOCYTES ABSOLUTE: 0.9 /ΜL
MONOCYTES RELATIVE PERCENT: 9.4 %
NEUTROPHILS ABSOLUTE: 5.9 /ΜL
NEUTROPHILS RELATIVE PERCENT: 65.6 %
NONHDLC SERPL-MCNC: NORMAL MG/DL
PDW BLD-RTO: 16.2 %
PLATELET # BLD: 312 K/ΜL
PMV BLD AUTO: 9 FL
POTASSIUM SERPL-SCNC: 3.9 MMOL/L
PROSTATE SPECIFIC ANTIGEN: 0.88 NG/ML
RBC # BLD: 4.8 10^6/ΜL
SODIUM BLD-SCNC: 140 MMOL/L
TOTAL PROTEIN: 7
TRIGL SERPL-MCNC: 192 MG/DL
VLDLC SERPL CALC-MCNC: NORMAL MG/DL
WBC # BLD: 9 10^3/ML

## 2023-07-05 DIAGNOSIS — I10 ESSENTIAL HYPERTENSION: ICD-10-CM

## 2023-07-05 DIAGNOSIS — Z12.5 SCREENING FOR PROSTATE CANCER: ICD-10-CM

## 2023-07-05 DIAGNOSIS — E78.2 MIXED HYPERLIPIDEMIA: ICD-10-CM

## 2023-07-20 RX ORDER — NAPROXEN 500 MG/1
TABLET ORAL
Qty: 60 TABLET | Refills: 5 | Status: SHIPPED | OUTPATIENT
Start: 2023-07-20

## 2023-08-12 ENCOUNTER — PATIENT MESSAGE (OUTPATIENT)
Dept: FAMILY MEDICINE CLINIC | Age: 68
End: 2023-08-12

## 2023-08-12 DIAGNOSIS — E11.9 DIABETES MELLITUS TYPE 2, INSULIN DEPENDENT (HCC): ICD-10-CM

## 2023-08-12 DIAGNOSIS — K51.90 ULCERATIVE COLITIS WITHOUT COMPLICATIONS, UNSPECIFIED LOCATION (HCC): ICD-10-CM

## 2023-08-12 DIAGNOSIS — Z79.4 DIABETES MELLITUS TYPE 2, INSULIN DEPENDENT (HCC): ICD-10-CM

## 2023-08-12 RX ORDER — PREDNISONE 2.5 MG/1
2.5 TABLET ORAL DAILY
Qty: 100 TABLET | Refills: 1 | Status: CANCELLED | OUTPATIENT
Start: 2023-08-12

## 2023-08-14 NOTE — TELEPHONE ENCOUNTER
From: Paulina Alexander  To: Dr. Deanne Ray: 8/12/2023 11:18 AM EDT  Subject: pen needles    i need my needle prescription renewed and it cannot be refilled via mychart.     Ra pen needle 31gx3/16\"

## 2023-09-01 LAB
AVERAGE GLUCOSE: 131
HBA1C MFR BLD: 6.2 %

## 2023-10-18 ENCOUNTER — TELEPHONE (OUTPATIENT)
Dept: PHARMACY | Facility: CLINIC | Age: 68
End: 2023-10-18

## 2023-10-18 NOTE — TELEPHONE ENCOUNTER
POPULATION HEALTH CLINICAL PHARMACY: ADHERENCE REVIEW  Identified care gap per [employer]: fills at [facility]: Statin adherence    ASSESSMENT   [address] Records claims through 10/09/2023 (Prior Year  [address] = not reported; YTD [address] = 80%; Potential Fail Date: 10/22/23):   ATORVASTATIN TAB 40MG last filled on 23 for 90 day supply. Next refill due: 23    Prescribed si tablet/capsule daily    Per Reconcile Dispense History: last filled on 23 for 90 day supply. Per [facility]: last picked up on 23 for 90 day supply. will get 90 day supply ready to  since past due. Billed through [place] ([facility]). Lab Results   Component Value Date    CHOL 136 2023    TRIG 192 2023    HDL 43 2023    LDLCHOLESTEROL 61 2022    LDLCALC 71 2023     ALT   Date Value Ref Range Status   2023 32 U/L Final     AST   Date Value Ref Range Status   2023 27 U/L Final     The 10-year ASCVD risk score (Lina DK, et al., 2019) is: 33.7%    Values used to calculate the score:      Age: 76 years      Sex: Male      Is Non- : No      Diabetic: Yes      Tobacco smoker: No      Systolic Blood Pressure: 533 mmHg      Is BP treated: Yes      HDL Cholesterol: 43 mg/dL      Total Cholesterol: 136 mg/dL     PLAN    The following are interventions that have been identified:   Patient overdue refilling Atorvastatin and active on home medication list.   Refill/s of Atorvastatin [facility] to  [facility]. Patient stated he is still taking Atorvastatin 40mg as prescribed, one tablet daily. Stated he is almost out of medication and was waiting for pharmacy to contact him about this. Advised patient that pharmacy was filling today and he stated he would  sometime this week. Patient very appreciative of call.        Last Visit: 23  Next Visit: 24        Margarita Zapata, [address] [place] [address]

## 2023-11-13 DIAGNOSIS — E11.9 DIABETES MELLITUS TYPE 2, INSULIN DEPENDENT (HCC): ICD-10-CM

## 2023-11-13 DIAGNOSIS — Z79.4 DIABETES MELLITUS TYPE 2, INSULIN DEPENDENT (HCC): ICD-10-CM

## 2023-11-13 NOTE — TELEPHONE ENCOUNTER
Last Appointment:  Visit date not found    Future appts:  Future Appointments   Date Time Provider [address]   2/9/2024  7:30 AM Zack Montenegro  [facility] [employer]

## 2023-12-01 LAB
ESTIMATED AVERAGE GLUCOSE: NORMAL
HBA1C MFR BLD: 6.5 %

## 2023-12-26 DIAGNOSIS — M62.830 SPASM OF MUSCLE OF LOWER BACK: ICD-10-CM

## 2023-12-26 DIAGNOSIS — Z79.4 DIABETES MELLITUS TYPE 2, INSULIN DEPENDENT (HCC): ICD-10-CM

## 2023-12-26 DIAGNOSIS — E78.2 MIXED HYPERLIPIDEMIA: ICD-10-CM

## 2023-12-26 DIAGNOSIS — E11.9 DIABETES MELLITUS TYPE 2, INSULIN DEPENDENT (HCC): ICD-10-CM

## 2023-12-26 DIAGNOSIS — I25.118 CORONARY ARTERY DISEASE OF NATIVE ARTERY OF NATIVE HEART WITH STABLE ANGINA PECTORIS (HCC): ICD-10-CM

## 2023-12-26 DIAGNOSIS — K51.90 ULCERATIVE COLITIS WITHOUT COMPLICATIONS, UNSPECIFIED LOCATION (HCC): ICD-10-CM

## 2023-12-26 DIAGNOSIS — K21.9 GASTROESOPHAGEAL REFLUX DISEASE WITHOUT ESOPHAGITIS: ICD-10-CM

## 2023-12-27 RX ORDER — CYCLOBENZAPRINE HCL 5 MG
5 TABLET ORAL EVERY 8 HOURS PRN
Qty: 100 TABLET | Refills: 1 | Status: SHIPPED | OUTPATIENT
Start: 2023-12-27

## 2023-12-27 NOTE — TELEPHONE ENCOUNTER
Last Appointment:  6/26/2023  Future Appointments   Date Time Provider [address]   2/19/2024  8:00 AM Clemencia Simon MD [facility]   2/19/2024  8:15 AM Clemencia Simon MD [address] [place] [address]

## 2023-12-28 RX ORDER — PREDNISONE 2.5 MG/1
2.5 TABLET ORAL DAILY
Qty: 100 TABLET | Refills: 1 | Status: SHIPPED | OUTPATIENT
Start: 2023-12-28

## 2023-12-28 RX ORDER — PANTOPRAZOLE SODIUM 40 MG/1
40 TABLET, DELAYED RELEASE ORAL DAILY
Qty: 100 TABLET | Refills: 1 | Status: SHIPPED | OUTPATIENT
Start: 2023-12-28

## 2023-12-28 RX ORDER — FENOFIBRATE 145 MG/1
145 TABLET, COATED ORAL DAILY
Qty: 100 TABLET | Refills: 1 | Status: SHIPPED | OUTPATIENT
Start: 2023-12-28

## 2023-12-28 NOTE — TELEPHONE ENCOUNTER
Last Appointment:  6/26/2023  Future Appointments   Date Time Provider [address]   2/19/2024  8:00 AM Ana Arevalo MD [facility]   2/19/2024  8:15 AM Ana Arevalo MD [address] [place] [address]

## 2024-01-10 NOTE — TELEPHONE ENCOUNTER
Last Appointment:  6/26/23  Future Appointments   Date Time Provider Department Center   2/19/2024  8:00 AM Isha Field MD [facility]   2/19/2024  8:15 AM Isha Field MD [facility]

## 2024-01-11 RX ORDER — NAPROXEN 500 MG/1
TABLET ORAL
Qty: 60 TABLET | Refills: 1 | Status: SHIPPED | OUTPATIENT
Start: 2024-01-11

## 2024-02-16 RX ORDER — ALENDRONATE SODIUM 70 MG/1
70 TABLET ORAL
Qty: 12 TABLET | Refills: 1 | Status: SHIPPED | OUTPATIENT
Start: 2024-02-16

## 2024-02-16 NOTE — TELEPHONE ENCOUNTER
Confirmed with patient that refill is needed. He takes dose on Saturday and is out of medication.     Last Appointment:  6/26/2023  Future Appointments   Date Time Provider Department Center   2/19/2024  8:00 AM Isha Field MD [facility]   2/19/2024  8:15 AM Isha Field MD [facility]

## 2024-02-19 ENCOUNTER — PATIENT MESSAGE (OUTPATIENT)
Dept: FAMILY MEDICINE CLINIC | Age: 69
End: 2024-02-19

## 2024-02-19 ENCOUNTER — OFFICE VISIT (OUTPATIENT)
Dept: FAMILY MEDICINE CLINIC | Age: 69
End: 2024-02-19
Payer: MEDICARE

## 2024-02-19 VITALS
HEIGHT: 72 IN | WEIGHT: 246 LBS | BODY MASS INDEX: 33.32 KG/M2 | SYSTOLIC BLOOD PRESSURE: 134 MMHG | HEART RATE: 83 BPM | DIASTOLIC BLOOD PRESSURE: 70 MMHG

## 2024-02-19 VITALS
HEIGHT: 72 IN | WEIGHT: 246 LBS | DIASTOLIC BLOOD PRESSURE: 70 MMHG | OXYGEN SATURATION: 98 % | BODY MASS INDEX: 33.32 KG/M2 | TEMPERATURE: 98.3 F | SYSTOLIC BLOOD PRESSURE: 134 MMHG | HEART RATE: 83 BPM

## 2024-02-19 DIAGNOSIS — Z79.4 DIABETES MELLITUS TYPE 2, INSULIN DEPENDENT (HCC): Primary | ICD-10-CM

## 2024-02-19 DIAGNOSIS — I25.118 CORONARY ARTERY DISEASE OF NATIVE ARTERY OF NATIVE HEART WITH STABLE ANGINA PECTORIS (HCC): ICD-10-CM

## 2024-02-19 DIAGNOSIS — K51.90 ULCERATIVE COLITIS WITHOUT COMPLICATIONS, UNSPECIFIED LOCATION (HCC): ICD-10-CM

## 2024-02-19 DIAGNOSIS — E11.9 DIABETES MELLITUS TYPE 2, INSULIN DEPENDENT (HCC): ICD-10-CM

## 2024-02-19 DIAGNOSIS — E78.2 MIXED HYPERLIPIDEMIA: ICD-10-CM

## 2024-02-19 DIAGNOSIS — I10 ESSENTIAL HYPERTENSION: ICD-10-CM

## 2024-02-19 DIAGNOSIS — F41.9 ANXIETY: ICD-10-CM

## 2024-02-19 DIAGNOSIS — Z79.4 DIABETES MELLITUS TYPE 2, INSULIN DEPENDENT (HCC): ICD-10-CM

## 2024-02-19 DIAGNOSIS — K21.9 GASTROESOPHAGEAL REFLUX DISEASE WITHOUT ESOPHAGITIS: ICD-10-CM

## 2024-02-19 DIAGNOSIS — Z87.891 PERSONAL HISTORY OF TOBACCO USE: ICD-10-CM

## 2024-02-19 DIAGNOSIS — Z00.00 MEDICARE ANNUAL WELLNESS VISIT, SUBSEQUENT: Primary | ICD-10-CM

## 2024-02-19 DIAGNOSIS — E11.9 DIABETES MELLITUS TYPE 2, INSULIN DEPENDENT (HCC): Primary | ICD-10-CM

## 2024-02-19 PROCEDURE — 99214 OFFICE O/P EST MOD 30 MIN: CPT | Performed by: FAMILY MEDICINE

## 2024-02-19 PROCEDURE — 1123F ACP DISCUSS/DSCN MKR DOCD: CPT | Performed by: FAMILY MEDICINE

## 2024-02-19 PROCEDURE — 3075F SYST BP GE 130 - 139MM HG: CPT | Performed by: FAMILY MEDICINE

## 2024-02-19 PROCEDURE — G0296 VISIT TO DETERM LDCT ELIG: HCPCS | Performed by: FAMILY MEDICINE

## 2024-02-19 PROCEDURE — 3078F DIAST BP <80 MM HG: CPT | Performed by: FAMILY MEDICINE

## 2024-02-19 PROCEDURE — G0439 PPPS, SUBSEQ VISIT: HCPCS | Performed by: FAMILY MEDICINE

## 2024-02-19 RX ORDER — FENOFIBRATE 145 MG/1
145 TABLET, COATED ORAL DAILY
Qty: 100 TABLET | Refills: 1 | Status: SHIPPED | OUTPATIENT
Start: 2024-02-19

## 2024-02-19 RX ORDER — ALENDRONATE SODIUM 70 MG/1
70 TABLET ORAL
Qty: 15 TABLET | Refills: 1 | Status: SHIPPED | OUTPATIENT
Start: 2024-02-19

## 2024-02-19 RX ORDER — INSULIN GLARGINE 100 [IU]/ML
62 INJECTION, SOLUTION SUBCUTANEOUS
COMMUNITY

## 2024-02-19 RX ORDER — ATORVASTATIN CALCIUM 40 MG/1
40 TABLET, FILM COATED ORAL DAILY
Qty: 100 TABLET | Refills: 1 | Status: SHIPPED | OUTPATIENT
Start: 2024-02-19

## 2024-02-19 RX ORDER — INSULIN LISPRO 100 [IU]/ML
INJECTION, SOLUTION INTRAVENOUS; SUBCUTANEOUS
COMMUNITY

## 2024-02-19 RX ORDER — CHOLECALCIFEROL (VITAMIN D3) 1250 MCG
1 CAPSULE ORAL EVERY OTHER DAY
COMMUNITY

## 2024-02-19 RX ORDER — CYCLOBENZAPRINE HCL 10 MG
10 TABLET ORAL 3 TIMES DAILY PRN
Qty: 30 TABLET | Refills: 0 | Status: SHIPPED | OUTPATIENT
Start: 2024-02-19 | End: 2024-02-29

## 2024-02-19 RX ORDER — PANTOPRAZOLE SODIUM 40 MG/1
40 TABLET, DELAYED RELEASE ORAL DAILY
Qty: 100 TABLET | Refills: 1 | Status: SHIPPED | OUTPATIENT
Start: 2024-02-19

## 2024-02-19 RX ORDER — PREDNISONE 2.5 MG/1
2.5 TABLET ORAL DAILY
Qty: 100 TABLET | Refills: 1 | Status: SHIPPED | OUTPATIENT
Start: 2024-02-19

## 2024-02-19 RX ORDER — LORAZEPAM 0.5 MG/1
0.5 TABLET ORAL ONCE
Qty: 1 TABLET | Refills: 0 | Status: SHIPPED | OUTPATIENT
Start: 2024-02-19 | End: 2024-02-19

## 2024-02-19 RX ORDER — AMLODIPINE BESYLATE 10 MG/1
10 TABLET ORAL DAILY
Qty: 100 TABLET | Refills: 1 | Status: SHIPPED | OUTPATIENT
Start: 2024-02-19

## 2024-02-19 ASSESSMENT — ENCOUNTER SYMPTOMS
VOMITING: 0
APNEA: 0
DIARRHEA: 0
FACIAL SWELLING: 0
SINUS PAIN: 0
COUGH: 0
CONSTIPATION: 0
COLOR CHANGE: 0
SHORTNESS OF BREATH: 0
RHINORRHEA: 0
ABDOMINAL DISTENTION: 0
SINUS PRESSURE: 0
PHOTOPHOBIA: 0
BLOOD IN STOOL: 0
CHEST TIGHTNESS: 0

## 2024-02-19 ASSESSMENT — LIFESTYLE VARIABLES
HOW OFTEN DURING THE LAST YEAR HAVE YOU FOUND THAT YOU WERE NOT ABLE TO STOP DRINKING ONCE YOU HAD STARTED: 0
HOW OFTEN DURING THE LAST YEAR HAVE YOU NEEDED AN ALCOHOLIC DRINK FIRST THING IN THE MORNING TO GET YOURSELF GOING AFTER A NIGHT OF HEAVY DRINKING: 0
HOW OFTEN DURING THE LAST YEAR HAVE YOU FAILED TO DO WHAT WAS NORMALLY EXPECTED FROM YOU BECAUSE OF DRINKING: 0
HOW OFTEN DURING THE LAST YEAR HAVE YOU BEEN UNABLE TO REMEMBER WHAT HAPPENED THE NIGHT BEFORE BECAUSE YOU HAD BEEN DRINKING: 0
HAS A RELATIVE, FRIEND, DOCTOR, OR ANOTHER HEALTH PROFESSIONAL EXPRESSED CONCERN ABOUT YOUR DRINKING OR SUGGESTED YOU CUT DOWN: 2
HOW OFTEN DURING THE LAST YEAR HAVE YOU HAD A FEELING OF GUILT OR REMORSE AFTER DRINKING: 0
HAVE YOU OR SOMEONE ELSE BEEN INJURED AS A RESULT OF YOUR DRINKING: 0
HOW OFTEN DO YOU HAVE A DRINK CONTAINING ALCOHOL: 2-3 TIMES A WEEK
HOW MANY STANDARD DRINKS CONTAINING ALCOHOL DO YOU HAVE ON A TYPICAL DAY: 5 OR 6

## 2024-02-19 ASSESSMENT — PATIENT HEALTH QUESTIONNAIRE - PHQ9
SUM OF ALL RESPONSES TO PHQ QUESTIONS 1-9: 6
SUM OF ALL RESPONSES TO PHQ9 QUESTIONS 1 & 2: 1
1. LITTLE INTEREST OR PLEASURE IN DOING THINGS: 1
10. IF YOU CHECKED OFF ANY PROBLEMS, HOW DIFFICULT HAVE THESE PROBLEMS MADE IT FOR YOU TO DO YOUR WORK, TAKE CARE OF THINGS AT HOME, OR GET ALONG WITH OTHER PEOPLE: 1
4. FEELING TIRED OR HAVING LITTLE ENERGY: 2
9. THOUGHTS THAT YOU WOULD BE BETTER OFF DEAD, OR OF HURTING YOURSELF: 0
SUM OF ALL RESPONSES TO PHQ QUESTIONS 1-9: 6
8. MOVING OR SPEAKING SO SLOWLY THAT OTHER PEOPLE COULD HAVE NOTICED. OR THE OPPOSITE, BEING SO FIGETY OR RESTLESS THAT YOU HAVE BEEN MOVING AROUND A LOT MORE THAN USUAL: 0
SUM OF ALL RESPONSES TO PHQ QUESTIONS 1-9: 6
5. POOR APPETITE OR OVEREATING: 0
3. TROUBLE FALLING OR STAYING ASLEEP: 2
6. FEELING BAD ABOUT YOURSELF - OR THAT YOU ARE A FAILURE OR HAVE LET YOURSELF OR YOUR FAMILY DOWN: 0
SUM OF ALL RESPONSES TO PHQ QUESTIONS 1-9: 6
2. FEELING DOWN, DEPRESSED OR HOPELESS: 0
7. TROUBLE CONCENTRATING ON THINGS, SUCH AS READING THE NEWSPAPER OR WATCHING TELEVISION: 1

## 2024-02-19 NOTE — TELEPHONE ENCOUNTER
From: Zenon Ornelas  To: Dr. Isha Field  Sent: 2/19/2024 10:16 AM EST  Subject: Lab Work and Lung Screening    I would like to schedule my lab work during the same visit that i get my lung screening.    Please let me know when the lab work order is sent to the [facility]. Am I to fast for the lab work?    Zenon

## 2024-02-19 NOTE — PROGRESS NOTES
Medicare Annual Wellness Visit    Zenon Ornelas is here for Medicare AWV    Assessment & Plan   Medicare annual wellness visit, subsequent  Anxiety  -     LORazepam (ATIVAN) 0.5 MG tablet; Take 1 tablet by mouth once for 1 dose. Max Daily Amount: 0.5 mg, Disp-1 tablet, R-0Normal  Personal history of tobacco use  -     IN VISIT TO DISCUSS LUNG CA SCREEN W LDCT  -     CT Lung Screen (Initial/Annual/Baseline); Future    Recommendations for Preventive Services Due: see orders and patient instructions/AVS.  Recommended screening schedule for the next 5-10 years is provided to the patient in written form: see Patient Instructions/AVS.     Return for Medicare Annual Wellness Visit in 1 year.     Subjective       Patient's complete Health Risk Assessment and screening values have been reviewed and are found in Flowsheets. The following problems were reviewed today and where indicated follow up appointments were made and/or referrals ordered.    Positive Risk Factor Screenings with Interventions:    Fall Risk:  Do you feel unsteady or are you worried about falling? : no  2 or more falls in past year?: no  Fall with injury in past year?: (!) yes     Interventions:    Reviewed medications, home hazards, visual acuity, and co-morbidities that can increase risk for falls     Depression:  PHQ-2 Score: 1  PHQ-9 Total Score: 6    Interpretation:  5-9 mild   10-14 moderate   15-19 moderately severe   20-27 severe     Interventions:  See AVS for additional education material    Alcohol Screening:  Alcohol Use: Heavy Drinker (2/19/2024)    AUDIT-C     Frequency of Alcohol Consumption: 2-3 times a week     Average Number of Drinks: 5 or 6     Frequency of Binge Drinking: Less than monthly      AUDIT-C Score: 6   AUDIT Total Score: 8    Interpretation of AUDIT-C score:   3-7 indicates potential alcohol risk.    8 or more is associated with harmful or hazardous drinking.   13 or more in women, and 15 or more in men, is likely to

## 2024-02-19 NOTE — PROGRESS NOTES
Zenon Ornelas is a 68 y.o. male accompanied by himself   CC:   Chief Complaint   Patient presents with    Diabetes     Goes to [facility] for insulin and testing supplies. Last A1c at clinic was 6.5 12/1/23.    Back Pain     Has increased Flexeril dosing - takes 1-3 tabs prn        Diabetes:   F/U   decent control   A1C 6.5 on December 1st   Hemoglobin A1C   Date Value Ref Range Status   09/01/2023 6.2 % Final     on statin   Lab Results   Component Value Date    LDLCALC 71 06/30/2023    LDLCHOLESTEROL 61 05/20/2022     On  Ace/Arb   Lab Results   Component Value Date    MALBCR 22.2 10/13/2022     Back Pain:   Occasional   Uses flexeril   Has increased to 10 mg   Would like to stay on it   - he does tolerate it well     Pruritus:   Persistent lower legs & back   Legs do have some venous stasis     Patient's past medical, surgical, social and/or family history reviewed, updated in chart, and are non-contributory (unless otherwise stated).  Medications and allergies also reviewed and updated in chart.      /70 (Site: Left Upper Arm, Position: Sitting, Cuff Size: Large Adult)   Pulse 83   Temp 98.3 °F (36.8 °C)   Ht 1.829 m (6')   Wt 111.6 kg (246 lb)   SpO2 98%   BMI 33.36 kg/m²     Review of Systems   Constitutional:  Negative for chills, fatigue and fever.   HENT:  Negative for congestion, ear pain, facial swelling, rhinorrhea, sinus pressure and sinus pain.    Eyes:  Negative for photophobia and visual disturbance.   Respiratory:  Negative for apnea, cough, chest tightness and shortness of breath.    Cardiovascular:  Negative for chest pain and palpitations.   Gastrointestinal:  Negative for abdominal distention, blood in stool, constipation, diarrhea and vomiting.   Endocrine: Negative for cold intolerance, polydipsia, polyphagia and polyuria.   Genitourinary:  Negative for decreased urine volume, frequency and urgency.   Musculoskeletal:  Negative for arthralgias and gait problem.

## 2024-02-19 NOTE — PATIENT INSTRUCTIONS
[email or web address] and enter U357 to learn more about \"Starting a Weight Loss Plan: Care Instructions.\"  Current as of: September 20, 2023               Content Version: 13.9  © 2006-2023 [employer].   Care instructions adapted under license by [employer]. If you have questions about a medical condition or this instruction, always ask your healthcare professional. [employer] disclaims any warranty or liability for your use of this information.           Learning About Lung Cancer Screening  What is screening for lung cancer?     Lung cancer screening is a way to find some lung cancers early, before a person has any symptoms of the cancer.  Lung cancer screening may help those who have the highest risk for lung cancer--people age 50 and older who are or were heavy smokers. For most people, who aren't at increased risk, screening for lung cancer probably isn't helpful.  Screening won't prevent cancer. And it may not find all lung cancers. Lung cancer screening may lower the risk of dying from lung cancer in a small number of people.  How is it done?  Lung cancer screening is done with a low-dose CT (computed tomography) scan. A CT scan uses X-rays, or radiation, to make detailed pictures of your body. Experts recommend that screening be done in medical centers that focus on finding and treating lung cancer.  Who is screening recommended for?  Lung cancer screening is recommended for people age 50 and older who are or were heavy smokers. That means people with a smoking history of at least 20 pack years. A pack year is a way to measure how heavy a smoker you are or were.  To figure out your pack years, multiply how many packs a day on average (assuming 20 cigarettes per pack) you have smoked by how many years you have smoked. For example:  If you smoked 1 pack a day for 20 years, that's 1 times 20. So you have a smoking history of 20 pack years.  If you smoked 2 packs a

## 2024-02-20 ENCOUNTER — PATIENT MESSAGE (OUTPATIENT)
Dept: FAMILY MEDICINE CLINIC | Age: 69
End: 2024-02-20

## 2024-02-20 NOTE — TELEPHONE ENCOUNTER
Dr Field can you sign pended lab orders? Patient will have abs done at [facility] and I will fax the orders the same time I Fax his CT order.

## 2024-02-20 NOTE — TELEPHONE ENCOUNTER
From: Zenon Ornelas  To: Dr. Isha Field  Sent: 2/20/2024 4:21 PM EST  Subject: [facility]    why does [facility] conatcting me about an appointment? they claim you sent an order to them. i am going to the hospital in [place]!!!!!!!!!!!!!!!!!!!!!!!!!!!!!!

## 2024-03-14 DIAGNOSIS — E11.9 DIABETES MELLITUS TYPE 2, INSULIN DEPENDENT (HCC): ICD-10-CM

## 2024-03-14 DIAGNOSIS — Z79.4 DIABETES MELLITUS TYPE 2, INSULIN DEPENDENT (HCC): ICD-10-CM

## 2024-03-14 DIAGNOSIS — I10 ESSENTIAL HYPERTENSION: ICD-10-CM

## 2024-03-14 DIAGNOSIS — F17.210 SMOKING GREATER THAN 40 PACK YEARS: ICD-10-CM

## 2024-03-14 DIAGNOSIS — E78.2 MIXED HYPERLIPIDEMIA: ICD-10-CM

## 2024-03-14 LAB
ALBUMIN SERPL-MCNC: 4.2 G/DL
ALP BLD-CCNC: 59 U/L
ALT SERPL-CCNC: 35 U/L
ANION GAP SERPL CALCULATED.3IONS-SCNC: 8 MMOL/L
AST SERPL-CCNC: 23 U/L
BASOPHILS ABSOLUTE: 0.1 /ΜL
BASOPHILS RELATIVE PERCENT: 0.5 %
BILIRUB SERPL-MCNC: 0.4 MG/DL (ref 0.1–1.4)
BUN BLDV-MCNC: 18 MG/DL
CALCIUM SERPL-MCNC: 9.4 MG/DL
CHLORIDE BLD-SCNC: 108 MMOL/L
CHOLESTEROL, FASTING: 118
CO2: 25 MMOL/L
CREAT SERPL-MCNC: 1.08 MG/DL
CREATININE URINE: NORMAL
EGFR: 68
EOSINOPHILS ABSOLUTE: 0.5 /ΜL
EOSINOPHILS RELATIVE PERCENT: 4.2 %
ESTIMATED AVERAGE GLUCOSE: 146
GLUCOSE BLD-MCNC: 92 MG/DL
HBA1C MFR BLD: 6.7 %
HCT VFR BLD CALC: 47.1 % (ref 41–53)
HDLC SERPL-MCNC: 45 MG/DL (ref 35–70)
HEMOGLOBIN: 15.2 G/DL (ref 13.5–17.5)
LDL CHOLESTEROL CALCULATED: 64 MG/DL (ref 0–160)
LYMPHOCYTES ABSOLUTE: 2 /ΜL
LYMPHOCYTES RELATIVE PERCENT: 18.8 %
MCH RBC QN AUTO: 30.1 PG
MCHC RBC AUTO-ENTMCNC: 32.4 G/DL
MCV RBC AUTO: 93 FL
MICROALBUMIN/CREAT 24H UR: 3.7 MG/G{CREAT}
MONOCYTES ABSOLUTE: 1.3 /ΜL
MONOCYTES RELATIVE PERCENT: 12.1 %
NEUTROPHILS ABSOLUTE: 6.9 /ΜL
NEUTROPHILS RELATIVE PERCENT: 64.4 %
PDW BLD-RTO: 16.7 %
PLATELET # BLD: 363 K/ΜL
PMV BLD AUTO: 9 FL
POTASSIUM SERPL-SCNC: 4.1 MMOL/L
RBC # BLD: 5.06 10^6/ΜL
SODIUM BLD-SCNC: 141 MMOL/L
TOTAL PROTEIN: 6.8
TRIGLYCERIDE, FASTING: 122
WBC # BLD: 10.8 10^3/ML

## 2024-03-20 ENCOUNTER — PATIENT MESSAGE (OUTPATIENT)
Dept: FAMILY MEDICINE CLINIC | Age: 69
End: 2024-03-20

## 2024-03-20 RX ORDER — CYCLOBENZAPRINE HCL 10 MG
10 TABLET ORAL 3 TIMES DAILY PRN
Qty: 100 TABLET | Refills: 1 | Status: SHIPPED | OUTPATIENT
Start: 2024-03-20 | End: 2024-10-06

## 2024-03-20 NOTE — TELEPHONE ENCOUNTER
2/19/24 Patient reported that he increased the number of cyclobenzaprine he was taking at time to 1 - 3 5mg tabs. You changed Rx and gave him 10mg tabs to use instead.     Zenon prefers a 100 day supply b/c it cost the same as a 30 day supply. 6/26/23 Rx for cyclobenzaprine 5mg q 8 hrs prn #100 w/ 1 refill and was lasting him 6 months.     New Rx pended for 10mg tabs q 8 hrs PRN #100 w/ 1 refill. Please sign if you are in agreement.

## 2024-03-20 NOTE — TELEPHONE ENCOUNTER
From: Zenon Ornelas  To: Dr. Isha Field  Sent: 3/20/2024 10:45 AM EDT  Subject: cyclobenziprine 10mg    During my last visit, you orderred the above Rx as a 30 day supply with no refills. I am almost out.    My insurance company allows for a 100 Day supply for the same price as a 30 day supply.    This medication is NOT on my list on [facility] site

## 2024-03-20 NOTE — TELEPHONE ENCOUNTER
Spoke w/ patient and let him know that updated Rx was sent for 10mg #100. Patient confirmed that he does not take more than 1 tab a day and that #100 is the correct amount.

## 2024-04-25 DIAGNOSIS — E78.2 MIXED HYPERLIPIDEMIA: ICD-10-CM

## 2024-04-29 RX ORDER — ATORVASTATIN CALCIUM 40 MG/1
40 TABLET, FILM COATED ORAL DAILY
Qty: 100 TABLET | Refills: 1 | Status: SHIPPED | OUTPATIENT
Start: 2024-04-29

## 2024-05-23 DIAGNOSIS — K21.9 GASTROESOPHAGEAL REFLUX DISEASE WITHOUT ESOPHAGITIS: ICD-10-CM

## 2024-05-23 DIAGNOSIS — I25.118 CORONARY ARTERY DISEASE OF NATIVE ARTERY OF NATIVE HEART WITH STABLE ANGINA PECTORIS (HCC): ICD-10-CM

## 2024-05-24 RX ORDER — PANTOPRAZOLE SODIUM 40 MG/1
40 TABLET, DELAYED RELEASE ORAL DAILY
Qty: 100 TABLET | Refills: 2 | Status: SHIPPED | OUTPATIENT
Start: 2024-05-24

## 2024-05-24 RX ORDER — CYCLOBENZAPRINE HCL 10 MG
10 TABLET ORAL 3 TIMES DAILY PRN
Qty: 150 TABLET | Refills: 0 | Status: SHIPPED | OUTPATIENT
Start: 2024-05-24 | End: 2024-09-01

## 2024-05-24 NOTE — TELEPHONE ENCOUNTER
Last Appointment:  2/19/2024  Future Appointments   Date Time Provider Department Center   8/9/2024  8:00 AM Isha Field MD [facility]

## 2024-06-07 LAB
ESTIMATED AVERAGE GLUCOSE: NORMAL
HBA1C MFR BLD: 6.3 %

## 2024-07-16 DIAGNOSIS — F41.9 ANXIETY: ICD-10-CM

## 2024-07-16 DIAGNOSIS — I25.118 CORONARY ARTERY DISEASE OF NATIVE ARTERY OF NATIVE HEART WITH STABLE ANGINA PECTORIS (HCC): ICD-10-CM

## 2024-07-16 DIAGNOSIS — K51.90 ULCERATIVE COLITIS WITHOUT COMPLICATIONS, UNSPECIFIED LOCATION (HCC): ICD-10-CM

## 2024-07-16 DIAGNOSIS — K21.9 GASTROESOPHAGEAL REFLUX DISEASE WITHOUT ESOPHAGITIS: ICD-10-CM

## 2024-07-16 DIAGNOSIS — I10 ESSENTIAL HYPERTENSION: ICD-10-CM

## 2024-07-16 DIAGNOSIS — Z79.4 DIABETES MELLITUS TYPE 2, INSULIN DEPENDENT (HCC): ICD-10-CM

## 2024-07-16 DIAGNOSIS — E78.2 MIXED HYPERLIPIDEMIA: ICD-10-CM

## 2024-07-16 DIAGNOSIS — E11.9 DIABETES MELLITUS TYPE 2, INSULIN DEPENDENT (HCC): ICD-10-CM

## 2024-07-16 RX ORDER — FENOFIBRATE 145 MG/1
145 TABLET, COATED ORAL DAILY
Qty: 100 TABLET | Refills: 1 | Status: SHIPPED | OUTPATIENT
Start: 2024-07-16

## 2024-07-16 RX ORDER — ALENDRONATE SODIUM 70 MG/1
70 TABLET ORAL
Qty: 15 TABLET | Refills: 1 | Status: SHIPPED | OUTPATIENT
Start: 2024-07-16

## 2024-07-16 RX ORDER — PANTOPRAZOLE SODIUM 40 MG/1
40 TABLET, DELAYED RELEASE ORAL DAILY
Qty: 100 TABLET | Refills: 2 | Status: SHIPPED | OUTPATIENT
Start: 2024-07-16

## 2024-07-16 RX ORDER — AMLODIPINE BESYLATE 10 MG/1
10 TABLET ORAL DAILY
Qty: 100 TABLET | Refills: 1 | Status: SHIPPED | OUTPATIENT
Start: 2024-07-16

## 2024-07-16 RX ORDER — PREDNISONE 2.5 MG/1
2.5 TABLET ORAL DAILY
Qty: 100 TABLET | Refills: 1 | Status: SHIPPED | OUTPATIENT
Start: 2024-07-16

## 2024-07-16 RX ORDER — CYCLOBENZAPRINE HCL 10 MG
10 TABLET ORAL 3 TIMES DAILY PRN
Qty: 150 TABLET | Refills: 0 | Status: SHIPPED | OUTPATIENT
Start: 2024-07-16 | End: 2024-10-24

## 2024-07-16 RX ORDER — ATORVASTATIN CALCIUM 40 MG/1
40 TABLET, FILM COATED ORAL DAILY
Qty: 100 TABLET | Refills: 1 | Status: SHIPPED | OUTPATIENT
Start: 2024-07-16

## 2024-07-19 NOTE — TELEPHONE ENCOUNTER
All scripts printed. Dr Field signed paper scripts and I faxed them to [facility] [phone]

## 2024-08-07 DIAGNOSIS — Z79.4 DIABETES MELLITUS TYPE 2, INSULIN DEPENDENT (HCC): ICD-10-CM

## 2024-08-07 DIAGNOSIS — E11.9 DIABETES MELLITUS TYPE 2, INSULIN DEPENDENT (HCC): ICD-10-CM

## 2024-08-07 NOTE — TELEPHONE ENCOUNTER
Last Appointment:  2/19/2024  Future Appointments   Date Time Provider Department Center   8/9/2024  8:00 AM Isha Field MD [facility] DEP

## 2024-08-08 ASSESSMENT — ANXIETY QUESTIONNAIRES
6. BECOMING EASILY ANNOYED OR IRRITABLE: NOT AT ALL
5. BEING SO RESTLESS THAT IT IS HARD TO SIT STILL: NOT AT ALL
3. WORRYING TOO MUCH ABOUT DIFFERENT THINGS: NOT AT ALL
6. BECOMING EASILY ANNOYED OR IRRITABLE: NOT AT ALL
1. FEELING NERVOUS, ANXIOUS, OR ON EDGE: NOT AT ALL
2. NOT BEING ABLE TO STOP OR CONTROL WORRYING: NOT AT ALL
GAD7 TOTAL SCORE: 1
3. WORRYING TOO MUCH ABOUT DIFFERENT THINGS: NOT AT ALL
4. TROUBLE RELAXING: SEVERAL DAYS
7. FEELING AFRAID AS IF SOMETHING AWFUL MIGHT HAPPEN: NOT AT ALL
2. NOT BEING ABLE TO STOP OR CONTROL WORRYING: NOT AT ALL
4. TROUBLE RELAXING: SEVERAL DAYS
1. FEELING NERVOUS, ANXIOUS, OR ON EDGE: NOT AT ALL
IF YOU CHECKED OFF ANY PROBLEMS ON THIS QUESTIONNAIRE, HOW DIFFICULT HAVE THESE PROBLEMS MADE IT FOR YOU TO DO YOUR WORK, TAKE CARE OF THINGS AT HOME, OR GET ALONG WITH OTHER PEOPLE: NOT DIFFICULT AT ALL
IF YOU CHECKED OFF ANY PROBLEMS ON THIS QUESTIONNAIRE, HOW DIFFICULT HAVE THESE PROBLEMS MADE IT FOR YOU TO DO YOUR WORK, TAKE CARE OF THINGS AT HOME, OR GET ALONG WITH OTHER PEOPLE: NOT DIFFICULT AT ALL
7. FEELING AFRAID AS IF SOMETHING AWFUL MIGHT HAPPEN: NOT AT ALL
5. BEING SO RESTLESS THAT IT IS HARD TO SIT STILL: NOT AT ALL

## 2024-08-08 ASSESSMENT — PATIENT HEALTH QUESTIONNAIRE - PHQ9
2. FEELING DOWN, DEPRESSED OR HOPELESS: NOT AT ALL
4. FEELING TIRED OR HAVING LITTLE ENERGY: SEVERAL DAYS
7. TROUBLE CONCENTRATING ON THINGS, SUCH AS READING THE NEWSPAPER OR WATCHING TELEVISION: NOT AT ALL
SUM OF ALL RESPONSES TO PHQ QUESTIONS 1-9: 3
SUM OF ALL RESPONSES TO PHQ QUESTIONS 1-9: 3
1. LITTLE INTEREST OR PLEASURE IN DOING THINGS: NOT AT ALL
7. TROUBLE CONCENTRATING ON THINGS, SUCH AS READING THE NEWSPAPER OR WATCHING TELEVISION: NOT AT ALL
6. FEELING BAD ABOUT YOURSELF - OR THAT YOU ARE A FAILURE OR HAVE LET YOURSELF OR YOUR FAMILY DOWN: NOT AT ALL
5. POOR APPETITE OR OVEREATING: SEVERAL DAYS
5. POOR APPETITE OR OVEREATING: SEVERAL DAYS
SUM OF ALL RESPONSES TO PHQ QUESTIONS 1-9: 3
8. MOVING OR SPEAKING SO SLOWLY THAT OTHER PEOPLE COULD HAVE NOTICED. OR THE OPPOSITE, BEING SO FIGETY OR RESTLESS THAT YOU HAVE BEEN MOVING AROUND A LOT MORE THAN USUAL: NOT AT ALL
3. TROUBLE FALLING OR STAYING ASLEEP: SEVERAL DAYS
8. MOVING OR SPEAKING SO SLOWLY THAT OTHER PEOPLE COULD HAVE NOTICED. OR THE OPPOSITE - BEING SO FIDGETY OR RESTLESS THAT YOU HAVE BEEN MOVING AROUND A LOT MORE THAN USUAL: NOT AT ALL
9. THOUGHTS THAT YOU WOULD BE BETTER OFF DEAD, OR OF HURTING YOURSELF: NOT AT ALL
10. IF YOU CHECKED OFF ANY PROBLEMS, HOW DIFFICULT HAVE THESE PROBLEMS MADE IT FOR YOU TO DO YOUR WORK, TAKE CARE OF THINGS AT HOME, OR GET ALONG WITH OTHER PEOPLE: NOT DIFFICULT AT ALL
4. FEELING TIRED OR HAVING LITTLE ENERGY: SEVERAL DAYS
SUM OF ALL RESPONSES TO PHQ QUESTIONS 1-9: 3
10. IF YOU CHECKED OFF ANY PROBLEMS, HOW DIFFICULT HAVE THESE PROBLEMS MADE IT FOR YOU TO DO YOUR WORK, TAKE CARE OF THINGS AT HOME, OR GET ALONG WITH OTHER PEOPLE: NOT DIFFICULT AT ALL
2. FEELING DOWN, DEPRESSED OR HOPELESS: NOT AT ALL
3. TROUBLE FALLING OR STAYING ASLEEP: SEVERAL DAYS
6. FEELING BAD ABOUT YOURSELF - OR THAT YOU ARE A FAILURE OR HAVE LET YOURSELF OR YOUR FAMILY DOWN: NOT AT ALL
1. LITTLE INTEREST OR PLEASURE IN DOING THINGS: NOT AT ALL
9. THOUGHTS THAT YOU WOULD BE BETTER OFF DEAD, OR OF HURTING YOURSELF: NOT AT ALL
SUM OF ALL RESPONSES TO PHQ9 QUESTIONS 1 & 2: 0
SUM OF ALL RESPONSES TO PHQ QUESTIONS 1-9: 3

## 2024-08-09 ENCOUNTER — OFFICE VISIT (OUTPATIENT)
Dept: FAMILY MEDICINE CLINIC | Age: 69
End: 2024-08-09
Payer: MEDICARE

## 2024-08-09 VITALS
BODY MASS INDEX: 32.51 KG/M2 | SYSTOLIC BLOOD PRESSURE: 120 MMHG | HEIGHT: 72 IN | DIASTOLIC BLOOD PRESSURE: 60 MMHG | TEMPERATURE: 97.1 F | OXYGEN SATURATION: 95 % | WEIGHT: 240 LBS | HEART RATE: 70 BPM

## 2024-08-09 DIAGNOSIS — E78.2 MIXED HYPERLIPIDEMIA: ICD-10-CM

## 2024-08-09 DIAGNOSIS — F41.9 ANXIETY: ICD-10-CM

## 2024-08-09 DIAGNOSIS — Z79.4 DIABETES MELLITUS TYPE 2, INSULIN DEPENDENT (HCC): ICD-10-CM

## 2024-08-09 DIAGNOSIS — K51.90 ULCERATIVE COLITIS WITHOUT COMPLICATIONS, UNSPECIFIED LOCATION (HCC): ICD-10-CM

## 2024-08-09 DIAGNOSIS — I25.118 CORONARY ARTERY DISEASE OF NATIVE ARTERY OF NATIVE HEART WITH STABLE ANGINA PECTORIS (HCC): ICD-10-CM

## 2024-08-09 DIAGNOSIS — I10 ESSENTIAL HYPERTENSION: ICD-10-CM

## 2024-08-09 DIAGNOSIS — K21.9 GASTROESOPHAGEAL REFLUX DISEASE WITHOUT ESOPHAGITIS: ICD-10-CM

## 2024-08-09 DIAGNOSIS — E11.9 DIABETES MELLITUS TYPE 2, INSULIN DEPENDENT (HCC): ICD-10-CM

## 2024-08-09 PROCEDURE — 99214 OFFICE O/P EST MOD 30 MIN: CPT | Performed by: FAMILY MEDICINE

## 2024-08-09 PROCEDURE — 3044F HG A1C LEVEL LT 7.0%: CPT | Performed by: FAMILY MEDICINE

## 2024-08-09 PROCEDURE — 1123F ACP DISCUSS/DSCN MKR DOCD: CPT | Performed by: FAMILY MEDICINE

## 2024-08-09 PROCEDURE — 3074F SYST BP LT 130 MM HG: CPT | Performed by: FAMILY MEDICINE

## 2024-08-09 PROCEDURE — 3078F DIAST BP <80 MM HG: CPT | Performed by: FAMILY MEDICINE

## 2024-08-09 RX ORDER — PANTOPRAZOLE SODIUM 40 MG/1
40 TABLET, DELAYED RELEASE ORAL DAILY
Qty: 100 TABLET | Refills: 2 | Status: SHIPPED | OUTPATIENT
Start: 2024-08-09

## 2024-08-09 RX ORDER — AMLODIPINE BESYLATE 10 MG/1
10 TABLET ORAL DAILY
Qty: 100 TABLET | Refills: 1 | Status: SHIPPED | OUTPATIENT
Start: 2024-08-09

## 2024-08-09 RX ORDER — PREDNISONE 2.5 MG/1
2.5 TABLET ORAL DAILY
Qty: 100 TABLET | Refills: 1 | Status: SHIPPED | OUTPATIENT
Start: 2024-08-09

## 2024-08-09 RX ORDER — ATORVASTATIN CALCIUM 40 MG/1
40 TABLET, FILM COATED ORAL DAILY
Qty: 100 TABLET | Refills: 1 | Status: SHIPPED | OUTPATIENT
Start: 2024-08-09

## 2024-08-09 RX ORDER — ALENDRONATE SODIUM 70 MG/1
70 TABLET ORAL
Qty: 15 TABLET | Refills: 1 | Status: SHIPPED | OUTPATIENT
Start: 2024-08-09

## 2024-08-09 RX ORDER — CYCLOBENZAPRINE HCL 10 MG
10 TABLET ORAL 3 TIMES DAILY PRN
Qty: 150 TABLET | Refills: 0 | Status: SHIPPED | OUTPATIENT
Start: 2024-08-09 | End: 2024-11-17

## 2024-08-09 RX ORDER — FENOFIBRATE 145 MG/1
145 TABLET, COATED ORAL DAILY
Qty: 100 TABLET | Refills: 1 | Status: SHIPPED | OUTPATIENT
Start: 2024-08-09

## 2024-08-09 RX ORDER — MESALAMINE 1.2 G/1
4800 TABLET, DELAYED RELEASE ORAL NIGHTLY
Qty: 400 TABLET | Refills: 1 | Status: SHIPPED | OUTPATIENT
Start: 2024-08-09

## 2024-08-09 SDOH — ECONOMIC STABILITY: INCOME INSECURITY: HOW HARD IS IT FOR YOU TO PAY FOR THE VERY BASICS LIKE FOOD, HOUSING, MEDICAL CARE, AND HEATING?: NOT HARD AT ALL

## 2024-08-09 SDOH — ECONOMIC STABILITY: FOOD INSECURITY: WITHIN THE PAST 12 MONTHS, YOU WORRIED THAT YOUR FOOD WOULD RUN OUT BEFORE YOU GOT MONEY TO BUY MORE.: NEVER TRUE

## 2024-08-09 SDOH — ECONOMIC STABILITY: FOOD INSECURITY: WITHIN THE PAST 12 MONTHS, THE FOOD YOU BOUGHT JUST DIDN'T LAST AND YOU DIDN'T HAVE MONEY TO GET MORE.: NEVER TRUE

## 2024-08-09 ASSESSMENT — ENCOUNTER SYMPTOMS
SINUS PAIN: 0
ABDOMINAL DISTENTION: 0
PHOTOPHOBIA: 0
VOMITING: 0
CHEST TIGHTNESS: 0
RHINORRHEA: 0
SINUS PRESSURE: 0
COLOR CHANGE: 0
FACIAL SWELLING: 0
DIARRHEA: 0
BLOOD IN STOOL: 0
CONSTIPATION: 0
COUGH: 0
SHORTNESS OF BREATH: 0
APNEA: 0

## 2024-08-09 NOTE — PROGRESS NOTES
Zenon Ornelas is a 69 y.o. male accompanied by himself   CC:   Chief Complaint   Patient presents with    4 month follow up     Diabetes     Goes to [facility] for insulin and testing supplies. Last A1c at clinic was 6.3 6/7/24    Shortness of Breath     Feels short of breath w/ activity: Going up steps, after a shower (also starts sweating immediately)       Dyspnea:   Dyspnea with exertion   Has bee progressively getting worse  Since 4 month ago significantly worse  Ok with flat walking but with any more exertion significant SOB    Is getting some swelling of the feet   Does see cardiology - Dr. Colon. Next appt sometime in the fall.   Has had bypass surgery ( 2017 )   - CABG * 2     Diabetes:   F/U   well control   Hemoglobin A1C   Date Value Ref Range Status   06/07/2024 6.3 % Final     On  statin   No results found for: \"LDLDIRECT\"  On  Ace/Arb   Lab Results   Component Value Date    MALBCR 22.2 10/13/2022     GERD:   F/U   Doing well   not having breakthrough   not ready to attempt drug holiday   Currently on Protonix   Last Endoscopy reviewed if available - barrets. Does see GI. Dr. Milton.     Osteoporosis:   DEXA Result (most recent):  DEXA BONE DENSITY AXIAL SKELETON 07/08/2021    Narrative  EXAMINATION:  BONE DENSITOMETRY    7/8/2021 5:54 am    TECHNIQUE:  A bone density DEXA scan was performed of the lumbar spine and bilateral hips  on a [employer] system.    COMPARISON:  None.    HISTORY:  ORDERING SYSTEM PROVIDED HISTORY: Long term systemic steroid user    FINDINGS:  LUMBAR SPINE:    The bone mineral density in the lumbar spine including the L1-L4 levels is  measured at 1.623 g/cm2, which corresponds to a T-score of 3.6 and a Z-score  of 2.9.  This is within the normal range by WHO criteria.    LEFT HIP:    The bone mineral density in the total hip is measured at 0.935 g/cm2  corresponding to a T-score of -0.6 and a Z-score of -1.0.  This is within the  normal range by WHO

## 2024-08-15 DIAGNOSIS — E78.2 MIXED HYPERLIPIDEMIA: ICD-10-CM

## 2024-08-15 DIAGNOSIS — I10 ESSENTIAL HYPERTENSION: ICD-10-CM

## 2024-08-15 LAB
ALBUMIN: 4.3 G/DL
ALP BLD-CCNC: 54 U/L
ALT SERPL-CCNC: 36 U/L
ANION GAP SERPL CALCULATED.3IONS-SCNC: 8 MMOL/L
AST SERPL-CCNC: 27 U/L
BASOPHILS ABSOLUTE: 0.1 /ΜL
BASOPHILS RELATIVE PERCENT: 0.6 %
BILIRUB SERPL-MCNC: 0.5 MG/DL (ref 0.1–1.4)
BUN BLDV-MCNC: 17 MG/DL
CALCIUM SERPL-MCNC: 9.7 MG/DL
CHLORIDE BLD-SCNC: 106 MMOL/L
CHOLESTEROL, TOTAL: 110 MG/DL
CHOLESTEROL/HDL RATIO: NORMAL
CO2: 27 MMOL/L
CREAT SERPL-MCNC: 1.07 MG/DL
EOSINOPHILS ABSOLUTE: 0.4 /ΜL
EOSINOPHILS RELATIVE PERCENT: 3.8 %
GFR, ESTIMATED: 69
GLUCOSE BLD-MCNC: 61 MG/DL
HCT VFR BLD CALC: 46.4 % (ref 41–53)
HDLC SERPL-MCNC: 41 MG/DL (ref 35–70)
HEMOGLOBIN: 15.4 G/DL (ref 13.5–17.5)
LDL CHOLESTEROL: 58
LYMPHOCYTES ABSOLUTE: 2.4 /ΜL
LYMPHOCYTES RELATIVE PERCENT: 22.2 %
MCH RBC QN AUTO: 29.9 PG
MCHC RBC AUTO-ENTMCNC: 33.3 G/DL
MCV RBC AUTO: 89.7 FL
MONOCYTES ABSOLUTE: 1.1 /ΜL
MONOCYTES RELATIVE PERCENT: 10.6 %
NEUTROPHILS ABSOLUTE: 6.7 /ΜL
NEUTROPHILS RELATIVE PERCENT: 62.8 %
NONHDLC SERPL-MCNC: NORMAL MG/DL
PDW BLD-RTO: 15.3 %
PLATELET # BLD: 344 K/ΜL
PMV BLD AUTO: 8.6 FL
POTASSIUM SERPL-SCNC: 3.8 MMOL/L
RBC # BLD: 5.17 10^6/ΜL
SODIUM BLD-SCNC: 141 MMOL/L
TOTAL PROTEIN: 7.2 G/DL (ref 6.4–8.2)
TRIGL SERPL-MCNC: 140 MG/DL
VLDLC SERPL CALC-MCNC: NORMAL MG/DL
WBC # BLD: 10.7 10^3/ML

## 2024-08-19 ENCOUNTER — PATIENT MESSAGE (OUTPATIENT)
Dept: FAMILY MEDICINE CLINIC | Age: 69
End: 2024-08-19

## 2024-08-19 DIAGNOSIS — I10 ESSENTIAL HYPERTENSION: Primary | ICD-10-CM

## 2024-08-19 DIAGNOSIS — R06.00 DYSPNEA, UNSPECIFIED TYPE: ICD-10-CM

## 2024-08-19 DIAGNOSIS — I25.118 CORONARY ARTERY DISEASE OF NATIVE ARTERY OF NATIVE HEART WITH STABLE ANGINA PECTORIS (HCC): ICD-10-CM

## 2024-09-09 LAB
ESTIMATED AVERAGE GLUCOSE: NORMAL
HBA1C MFR BLD: 6.2 %

## 2024-10-22 NOTE — TELEPHONE ENCOUNTER
Last Appointment:  8/9/2024  Future Appointments   Date Time Provider Department Center   2/10/2025  8:00 AM Isha Field MD [facility] DEP

## 2024-10-24 RX ORDER — CYCLOBENZAPRINE HCL 10 MG
10 TABLET ORAL 3 TIMES DAILY PRN
Qty: 150 TABLET | Refills: 0 | Status: SHIPPED | OUTPATIENT
Start: 2024-10-24 | End: 2025-02-01

## 2025-02-08 DIAGNOSIS — E78.2 MIXED HYPERLIPIDEMIA: ICD-10-CM

## 2025-02-09 DIAGNOSIS — E78.2 MIXED HYPERLIPIDEMIA: ICD-10-CM

## 2025-02-10 RX ORDER — FENOFIBRATE 145 MG/1
145 TABLET, COATED ORAL DAILY
Qty: 90 TABLET | Refills: 1 | Status: SHIPPED | OUTPATIENT
Start: 2025-02-10

## 2025-02-10 RX ORDER — FENOFIBRATE 145 MG/1
145 TABLET, COATED ORAL DAILY
Qty: 100 TABLET | Refills: 1 | OUTPATIENT
Start: 2025-02-10

## 2025-02-10 NOTE — TELEPHONE ENCOUNTER
Name of Medication(s) Requested:  Requested Prescriptions     Pending Prescriptions Disp Refills    fenofibrate (TRICOR) 145 MG tablet 90 tablet 1     Sig: Take 1 tablet by mouth daily       Medication is on current medication list Yes    Dosage and directions were verified? Yes    Quantity verified: 90 day supply     Pharmacy Verified?  Yes    Last Appointment:  8/9/2024    Future appts:  Future Appointments   Date Time Provider Department Center   4/10/2025  8:30 AM Isha Field MD [facility] DEP        (If no appt send self scheduling link. .REFILLAPPT)  Scheduling request sent?     [] Yes  [x] No    Does patient need updated?  [] Yes  [x] No

## 2025-02-11 DIAGNOSIS — E78.2 MIXED HYPERLIPIDEMIA: ICD-10-CM

## 2025-02-12 RX ORDER — FENOFIBRATE 145 MG/1
145 TABLET, COATED ORAL DAILY
Qty: 90 TABLET | Refills: 1 | OUTPATIENT
Start: 2025-02-12

## 2025-02-21 ENCOUNTER — PATIENT MESSAGE (OUTPATIENT)
Dept: FAMILY MEDICINE CLINIC | Age: 70
End: 2025-02-21

## 2025-02-23 DIAGNOSIS — K51.90 ULCERATIVE COLITIS WITHOUT COMPLICATIONS, UNSPECIFIED LOCATION (HCC): ICD-10-CM

## 2025-02-24 RX ORDER — MESALAMINE 1.2 G/1
TABLET, DELAYED RELEASE ORAL
Qty: 400 TABLET | Refills: 1 | Status: SHIPPED | OUTPATIENT
Start: 2025-02-24

## 2025-02-24 NOTE — TELEPHONE ENCOUNTER
Name of Medication(s) Requested:  Requested Prescriptions     Pending Prescriptions Disp Refills    mesalamine (LIALDA) 1.2 g EC tablet [Pharmacy Med Name: MESALAMINE 1.2GM TABLETS] 400 tablet 1     Sig: TAKE 4 TABLETS BY MOUTH EVERY NIGHT       Medication is on current medication list Yes    Dosage and directions were verified? Yes    Quantity verified: 90 day supply     Pharmacy Verified?  Yes    Last Appointment:  8/9/2024    Future appts:  Future Appointments   Date Time Provider Department Center   4/10/2025  8:30 AM Isha Field MD [facility] DEP   4/10/2025  8:45 AM Isha Field MD [facility] DEP        (If no appt send self scheduling link. .REFILLAPPT)  Scheduling request sent?     [] Yes  [x] No    Does patient need updated?  [] Yes  [x] No

## 2025-02-26 ENCOUNTER — PATIENT MESSAGE (OUTPATIENT)
Dept: FAMILY MEDICINE CLINIC | Age: 70
End: 2025-02-26

## 2025-02-26 DIAGNOSIS — K21.9 GASTROESOPHAGEAL REFLUX DISEASE WITHOUT ESOPHAGITIS: ICD-10-CM

## 2025-02-26 RX ORDER — PANTOPRAZOLE SODIUM 40 MG/1
40 TABLET, DELAYED RELEASE ORAL DAILY
Qty: 90 TABLET | Refills: 1 | Status: SHIPPED
Start: 2025-02-26 | End: 2025-02-27 | Stop reason: SDUPTHER

## 2025-02-26 NOTE — TELEPHONE ENCOUNTER
Name of Medication(s) Requested:  Requested Prescriptions     Pending Prescriptions Disp Refills    pantoprazole (PROTONIX) 40 MG tablet [Pharmacy Med Name: PANTOPRAZOLE 40MG TABLETS] 100 tablet 2     Sig: TAKE 1 TABLET BY MOUTH DAILY       Medication is on current medication list Yes    Dosage and directions were verified? Yes    Quantity verified: 90 day supply     Pharmacy Verified?  Yes    Last Appointment:  8/9/2024    Future appts:  Future Appointments   Date Time Provider Department Center   4/10/2025  8:30 AM Isha Field MD [facility] DEP   4/10/2025  8:45 AM Isha Field MD [facility] DEP        (If no appt send self scheduling link. .REFILLAPPT)  Scheduling request sent?     [] Yes  [x] No    Does patient need updated?  [] Yes  [x] No

## 2025-02-27 RX ORDER — PANTOPRAZOLE SODIUM 40 MG/1
40 TABLET, DELAYED RELEASE ORAL DAILY
Qty: 100 TABLET | Refills: 3 | Status: SHIPPED | OUTPATIENT
Start: 2025-02-27

## 2025-03-04 DIAGNOSIS — E11.9 DIABETES MELLITUS TYPE 2, INSULIN DEPENDENT (HCC): ICD-10-CM

## 2025-03-04 DIAGNOSIS — I10 ESSENTIAL HYPERTENSION: ICD-10-CM

## 2025-03-04 DIAGNOSIS — Z79.4 DIABETES MELLITUS TYPE 2, INSULIN DEPENDENT (HCC): ICD-10-CM

## 2025-03-04 DIAGNOSIS — F41.9 ANXIETY: ICD-10-CM

## 2025-03-04 RX ORDER — AMLODIPINE BESYLATE 10 MG/1
10 TABLET ORAL DAILY
Qty: 100 TABLET | Refills: 1 | Status: SHIPPED | OUTPATIENT
Start: 2025-03-04

## 2025-03-04 NOTE — TELEPHONE ENCOUNTER
Name of Medication(s) Requested:  Requested Prescriptions     Pending Prescriptions Disp Refills    amLODIPine (NORVASC) 10 MG tablet [Pharmacy Med Name: AMLODIPINE BESYLATE 10MG TABLETS] 100 tablet 1     Sig: TAKE 1 TABLET BY MOUTH DAILY    metFORMIN (GLUCOPHAGE) 500 MG tablet [Pharmacy Med Name: METFORMIN 500MG TABLETS] 400 tablet 1     Sig: TAKE 2 TABLETS BY MOUTH TWICE DAILY WITH MEALS    sertraline (ZOLOFT) 50 MG tablet [Pharmacy Med Name: SERTRALINE 50MG TABLETS] 100 tablet 1     Sig: TAKE 1 TABLET BY MOUTH DAILY       Medication is on current medication list Yes    Dosage and directions were verified? Yes    Quantity verified: 90 day supply     Pharmacy Verified?  Yes    Last Appointment:  8/9/2024    Future appts:  Future Appointments   Date Time Provider Department Center   4/10/2025  8:30 AM Isha Field MD [facility] DEP   4/10/2025  8:45 AM Isha Field MD [facility] DEP        (If no appt send self scheduling link. .REFILLAPPT)  Scheduling request sent?     [] Yes  [x] No    Does patient need updated?  [] Yes  [x] No

## 2025-03-14 LAB
ESTIMATED AVERAGE GLUCOSE: NORMAL
HBA1C MFR BLD: 6.5 %

## 2025-04-10 ENCOUNTER — OFFICE VISIT (OUTPATIENT)
Dept: FAMILY MEDICINE CLINIC | Age: 70
End: 2025-04-10

## 2025-04-10 VITALS
BODY MASS INDEX: 32.78 KG/M2 | TEMPERATURE: 97.9 F | DIASTOLIC BLOOD PRESSURE: 68 MMHG | RESPIRATION RATE: 16 BRPM | SYSTOLIC BLOOD PRESSURE: 132 MMHG | OXYGEN SATURATION: 95 % | WEIGHT: 242 LBS | HEIGHT: 72 IN | HEART RATE: 72 BPM

## 2025-04-10 DIAGNOSIS — Z79.4 DIABETES MELLITUS TYPE 2, INSULIN DEPENDENT (HCC): ICD-10-CM

## 2025-04-10 DIAGNOSIS — E78.2 MIXED HYPERLIPIDEMIA: ICD-10-CM

## 2025-04-10 DIAGNOSIS — R06.02 SHORTNESS OF BREATH: ICD-10-CM

## 2025-04-10 DIAGNOSIS — I25.118 CORONARY ARTERY DISEASE OF NATIVE ARTERY OF NATIVE HEART WITH STABLE ANGINA PECTORIS: ICD-10-CM

## 2025-04-10 DIAGNOSIS — Z00.00 MEDICARE ANNUAL WELLNESS VISIT, SUBSEQUENT: Primary | ICD-10-CM

## 2025-04-10 DIAGNOSIS — F40.240 CLAUSTROPHOBIA: ICD-10-CM

## 2025-04-10 DIAGNOSIS — K51.90 ULCERATIVE COLITIS WITHOUT COMPLICATIONS, UNSPECIFIED LOCATION (HCC): ICD-10-CM

## 2025-04-10 DIAGNOSIS — E11.9 DIABETES MELLITUS TYPE 2, INSULIN DEPENDENT (HCC): ICD-10-CM

## 2025-04-10 DIAGNOSIS — M85.859 OSTEOPENIA OF NECK OF FEMUR, UNSPECIFIED LATERALITY: Primary | ICD-10-CM

## 2025-04-10 DIAGNOSIS — M85.88 OTHER SPECIFIED DISORDERS OF BONE DENSITY AND STRUCTURE, OTHER SITE: ICD-10-CM

## 2025-04-10 DIAGNOSIS — R39.9 LOWER URINARY TRACT SYMPTOMS (LUTS): ICD-10-CM

## 2025-04-10 RX ORDER — METOPROLOL TARTRATE 25 MG/1
25 TABLET, FILM COATED ORAL 2 TIMES DAILY
Qty: 200 TABLET | Refills: 3 | Status: SHIPPED | OUTPATIENT
Start: 2025-04-10

## 2025-04-10 RX ORDER — ATORVASTATIN CALCIUM 40 MG/1
40 TABLET, FILM COATED ORAL DAILY
Qty: 100 TABLET | Refills: 3 | Status: SHIPPED | OUTPATIENT
Start: 2025-04-10

## 2025-04-10 RX ORDER — HYDROCHLOROTHIAZIDE 12.5 MG/1
CAPSULE ORAL
COMMUNITY
Start: 2025-02-26

## 2025-04-10 RX ORDER — CYCLOBENZAPRINE HCL 10 MG
10 TABLET ORAL 3 TIMES DAILY PRN
Qty: 150 TABLET | Refills: 0 | Status: SHIPPED | OUTPATIENT
Start: 2025-04-10 | End: 2025-07-19

## 2025-04-10 RX ORDER — ALENDRONATE SODIUM 70 MG/1
70 TABLET ORAL
Qty: 15 TABLET | Refills: 1 | Status: CANCELLED | OUTPATIENT
Start: 2025-04-10

## 2025-04-10 RX ORDER — TAMSULOSIN HYDROCHLORIDE 0.4 MG/1
0.4 CAPSULE ORAL DAILY
Qty: 30 CAPSULE | Refills: 3 | Status: SHIPPED | OUTPATIENT
Start: 2025-04-10

## 2025-04-10 RX ORDER — PREDNISONE 2.5 MG/1
2.5 TABLET ORAL DAILY
Qty: 100 TABLET | Refills: 1 | Status: SHIPPED | OUTPATIENT
Start: 2025-04-10

## 2025-04-10 ASSESSMENT — ENCOUNTER SYMPTOMS
SINUS PAIN: 0
CONSTIPATION: 0
PHOTOPHOBIA: 0
FACIAL SWELLING: 0
BLOOD IN STOOL: 0
COUGH: 0
SHORTNESS OF BREATH: 0
SINUS PRESSURE: 0
DIARRHEA: 0
RHINORRHEA: 0
CHEST TIGHTNESS: 0
VOMITING: 0
APNEA: 0
COLOR CHANGE: 0
ABDOMINAL DISTENTION: 0

## 2025-04-10 ASSESSMENT — PATIENT HEALTH QUESTIONNAIRE - PHQ9
SUM OF ALL RESPONSES TO PHQ QUESTIONS 1-9: 0
1. LITTLE INTEREST OR PLEASURE IN DOING THINGS: NOT AT ALL
SUM OF ALL RESPONSES TO PHQ QUESTIONS 1-9: 0
2. FEELING DOWN, DEPRESSED OR HOPELESS: NOT AT ALL

## 2025-04-10 ASSESSMENT — LIFESTYLE VARIABLES
HOW OFTEN DURING THE LAST YEAR HAVE YOU FAILED TO DO WHAT WAS NORMALLY EXPECTED FROM YOU BECAUSE OF DRINKING: NEVER
HOW OFTEN DURING THE LAST YEAR HAVE YOU FOUND THAT YOU WERE NOT ABLE TO STOP DRINKING ONCE YOU HAD STARTED: NEVER
HAS A RELATIVE, FRIEND, DOCTOR, OR ANOTHER HEALTH PROFESSIONAL EXPRESSED CONCERN ABOUT YOUR DRINKING OR SUGGESTED YOU CUT DOWN: NO
HOW OFTEN DO YOU HAVE A DRINK CONTAINING ALCOHOL: 4 OR MORE TIMES A WEEK
HAVE YOU OR SOMEONE ELSE BEEN INJURED AS A RESULT OF YOUR DRINKING: NO
HOW OFTEN DURING THE LAST YEAR HAVE YOU HAD A FEELING OF GUILT OR REMORSE AFTER DRINKING: LESS THAN MONTHLY
HOW OFTEN DURING THE LAST YEAR HAVE YOU NEEDED AN ALCOHOLIC DRINK FIRST THING IN THE MORNING TO GET YOURSELF GOING AFTER A NIGHT OF HEAVY DRINKING: NEVER
HOW OFTEN DURING THE LAST YEAR HAVE YOU BEEN UNABLE TO REMEMBER WHAT HAPPENED THE NIGHT BEFORE BECAUSE YOU HAD BEEN DRINKING: NEVER
HOW MANY STANDARD DRINKS CONTAINING ALCOHOL DO YOU HAVE ON A TYPICAL DAY: 5 OR 6

## 2025-04-10 NOTE — PROGRESS NOTES
Reports improvement in symptoms since starting sertraline.  - Discussed the avoidance of MRI due to claustrophobia.    Follow-up  - Bone density scan will be done here.          Follow Up:  No follow-ups on file.      As above.  Call or go to ED immediately if symptoms worsen or persist.  No follow-ups on file., or sooner if necessary.      Counseled regarding above diagnosis, including possible risks and complications,  especially if left uncontrolled.    Counseledregarding the possible side effects, risks, benefits and alternatives to treatment; patient and/or guardian verbalizes understanding, agrees, feels comfortable with and wishes to proceed with above treatment plan.  patient to call with any new medication issues, and read all Rx info from pharmacy to assure aware of all possible risks and side effects of medication before taking.    Reviewed age and gender appropriatehealth screening exams and vaccinations.  Advised patient regarding importance of keeping up with recommended health maintenance and to schedule as soon as possible if overdue, as this is important in assessing forundiagnosed pathology, especially cancer, as well as protecting against potentially harmful/life threatening disease.        Patient and/or guardian verbalizes understanding and agrees with above counseling,assessment and plan.    All questions answered.     The patient (or guardian, if applicable) and other individuals in attendance with the patient were advised that Artificial Intelligence will be utilized during this visit to record, process the conversation to generate a clinical note, and support improvement of the AI technology. The patient (or guardian, if applicable) and other individuals in attendance at the appointment consented to the use of AI, including the recording.

## 2025-04-11 ENCOUNTER — TELEPHONE (OUTPATIENT)
Dept: FAMILY MEDICINE CLINIC | Age: 70
End: 2025-04-11

## 2025-04-11 DIAGNOSIS — E11.9 DIABETES MELLITUS TYPE 2, INSULIN DEPENDENT (HCC): ICD-10-CM

## 2025-04-11 DIAGNOSIS — Z79.4 DIABETES MELLITUS TYPE 2, INSULIN DEPENDENT (HCC): ICD-10-CM

## 2025-04-11 DIAGNOSIS — Z12.5 SCREENING FOR PROSTATE CANCER: ICD-10-CM

## 2025-04-11 DIAGNOSIS — E55.9 VITAMIN D DEFICIENCY: ICD-10-CM

## 2025-04-11 DIAGNOSIS — M85.859 OSTEOPENIA OF NECK OF FEMUR, UNSPECIFIED LATERALITY: ICD-10-CM

## 2025-04-11 DIAGNOSIS — E78.2 MIXED HYPERLIPIDEMIA: Primary | ICD-10-CM

## 2025-04-11 RX ORDER — TAMSULOSIN HYDROCHLORIDE 0.4 MG/1
0.4 CAPSULE ORAL DAILY
Qty: 90 CAPSULE | OUTPATIENT
Start: 2025-04-11

## 2025-04-11 NOTE — TELEPHONE ENCOUNTER
Placed lab orders for patient to do before his next appointment (per conversation w/ Dr Field yesterday).     Mailing copy to patient and faxing to [facility].

## 2025-04-12 ENCOUNTER — PATIENT MESSAGE (OUTPATIENT)
Dept: FAMILY MEDICINE CLINIC | Age: 70
End: 2025-04-12

## 2025-04-12 DIAGNOSIS — R39.9 LOWER URINARY TRACT SYMPTOMS (LUTS): ICD-10-CM

## 2025-04-15 RX ORDER — TAMSULOSIN HYDROCHLORIDE 0.4 MG/1
0.4 CAPSULE ORAL DAILY
Qty: 100 CAPSULE | Refills: 0 | Status: SHIPPED | OUTPATIENT
Start: 2025-04-15

## 2025-04-15 NOTE — TELEPHONE ENCOUNTER
Patient's insurance covers 100 day prescriptions. He is requesting that Tamsulosin be prescribed for 100 days instead of 30. New Rx is ready to be sent.

## 2025-04-17 NOTE — PATIENT INSTRUCTIONS
9 Ways to Cut Back on Drinking  Maybe you've found yourself drinking more alcohol than you'd prefer. If you want to cut back, here are some ideas to try.    Think before you drink.  Do you really want a drink, or is it just a habit? If you're used to having a drink at a certain time, try doing something else then.     Look for substitutes.  Find some no-alcohol drinks that you enjoy, like flavored seltzer water, tea with honey, or tonic with a slice of lime. Or try alcohol-free beer or \"virgin\" cocktails (without the alcohol).     Drink more water.  Use water to quench your thirst. Drink a glass of water before you have any alcohol. Have another glass along with every drink or between drinks.     Shrink your drink.  For example, have a bottle of beer instead of a pint. Use a smaller glass for wine. Choose drinks with lower alcohol content (ABV%). Or use less liquor and more mixer in cocktails.     Slow down.  It's easy to drink quickly and without thinking about it. Pay attention, and make each drink last longer.     Do the math.  Total up how much you spend on alcohol each month. How much is that a year? If you cut back, what could you do with the money you save?     Take a break.  Choose a day or two each week when you won't drink at all. Notice how you feel on those days, physically and emotionally. How did you sleep? Do you feel better? Over time, add more break days.     Count calories.  Would you like to lose some weight? For some people that's a good motivator for cutting back. Figure out how many calories are in each drink. How many does that add up to in a day? In a week? In a month?     Practice saying no.  Be ready when someone offers you a drink. Try: \"Thanks, I've had enough.\" Or \"Thanks, but I'm cutting back.\" Or \"No, thanks. I feel better when I drink less.\"   Current as of: August 20, 2024  Content Version: 14.4  © 2061-0040 [facility].   Care instructions adapted under license by

## 2025-04-17 NOTE — PROGRESS NOTES
Medicare Annual Wellness Visit    Zenon Ornelas is here for Medicare AWV    Assessment & Plan   Medicare annual wellness visit, subsequent     Return for Medicare Annual Wellness Visit in 1 year.     Subjective       Patient's complete Health Risk Assessment and screening values have been reviewed and are found in Flowsheets. The following problems were reviewed today and where indicated follow up appointments were made and/or referrals ordered.    Positive Risk Factor Screenings with Interventions:       Alcohol Screening:  AUDIT-C Score: 7  AUDIT Total Score: 8  Total Score Interpretation: 8-14 suggests harmful or hazardous alcohol consumption in men   Interventions:  Patient advised to follow up in the office for further evalution and treatment             General HRA Questions:  Select all that apply: (!) New or Increased Fatigue  Interventions Fatigue:  Patient advised to follow up in the office for further evaluation and treatment      Inactivity:  On average, how many days per week do you engage in moderate to strenuous exercise (like a brisk walk)?: 0 days (!) Abnormal  On average, how many minutes do you engage in exercise at this level?: 0 min  Interventions:  Patient declined any further interventions or treatment    Poor Eating Habits/Diet:  Do you eat balanced/healthy meals regularly?: (!) No  Interventions:  Patient advised to follow-up in this office for further evaluation and treatment    Abnormal BMI (obese):  There is no height or weight on file to calculate BMI. (!) Abnormal  Interventions:  Patient advised to follow-up in this office for further evaluation and treatment           Safety:  Do you have non-slip mats or non-slip surfaces or shower bars or grab bars in your shower or bathtub?: (!) No  Interventions:  Patient declined any further interventions or treatment      Tobacco Use:    Tobacco Use      Smoking status: Every Day        Packs/day: 0.00        Years: 1.5 packs/day for 35.0 years

## 2025-05-06 ENCOUNTER — PATIENT MESSAGE (OUTPATIENT)
Dept: FAMILY MEDICINE CLINIC | Age: 70
End: 2025-05-06

## 2025-05-06 DIAGNOSIS — E78.2 MIXED HYPERLIPIDEMIA: ICD-10-CM

## 2025-05-07 NOTE — TELEPHONE ENCOUNTER
Patient is asking for a dose change on fenofibrate from 145mg to 134mg. States he would like Rx to go through [facility] mail order pharmacy. Pended Rx for 100 days (patients preference).

## 2025-05-08 RX ORDER — FENOFIBRATE 134 MG/1
134 CAPSULE ORAL
Qty: 100 CAPSULE | Refills: 0 | Status: SHIPPED | OUTPATIENT
Start: 2025-05-08 | End: 2025-08-16

## 2025-05-27 ENCOUNTER — PATIENT MESSAGE (OUTPATIENT)
Dept: FAMILY MEDICINE CLINIC | Age: 70
End: 2025-05-27

## 2025-05-27 RX ORDER — FENOFIBRATE 134 MG/1
134 CAPSULE ORAL
Qty: 100 CAPSULE | Refills: 3 | Status: SHIPPED | OUTPATIENT
Start: 2025-05-27 | End: 2025-09-04

## 2025-05-27 NOTE — TELEPHONE ENCOUNTER
Last Appointment:  4/10/2025  Future Appointments   Date Time Provider Department Center   8/29/2025  8:00 AM Isha Field MD [facility]   8/29/2025  8:30 AM [facility]   8/29/2025  9:00 AM [facility]A [facility]

## 2025-06-20 ENCOUNTER — PATIENT MESSAGE (OUTPATIENT)
Dept: FAMILY MEDICINE CLINIC | Age: 70
End: 2025-06-20

## 2025-07-11 DIAGNOSIS — R39.9 LOWER URINARY TRACT SYMPTOMS (LUTS): ICD-10-CM

## 2025-07-11 NOTE — TELEPHONE ENCOUNTER
Patient comment: i am not sure if i should continue this since it was written with NO refills.....Regarding my question about fish oil, i went ahead and bought the higher dosage when you didn't respond for 2 weeks     Name of Medication(s) Requested:  Requested Prescriptions     Pending Prescriptions Disp Refills    tamsulosin (FLOMAX) 0.4 MG capsule 100 capsule 0     Sig: Take 1 capsule by mouth daily       Medication is on current medication list Yes    Dosage and directions were verified? Yes    Quantity verified: 90 day supply     Pharmacy Verified?  Yes    Last Appointment:  4/10/2025    Future appts:  Future Appointments   Date Time Provider Department Center   8/29/2025  8:00 AM Isha Field MD [facility] DEP   8/29/2025  8:30 AM [facility]   8/29/2025  9:00 AM [facility]EXA [facility]        (If no appt send self scheduling link. .REFILLAPPT)  Scheduling request sent?     [] Yes  [x] No    Does patient need updated?  [] Yes  [x] No

## 2025-07-14 RX ORDER — TAMSULOSIN HYDROCHLORIDE 0.4 MG/1
0.4 CAPSULE ORAL DAILY
Qty: 100 CAPSULE | Refills: 2 | Status: SHIPPED | OUTPATIENT
Start: 2025-07-14

## 2025-08-11 ENCOUNTER — PATIENT MESSAGE (OUTPATIENT)
Dept: FAMILY MEDICINE CLINIC | Age: 70
End: 2025-08-11

## 2025-08-13 LAB
25-HYDROXY VITAMIN D-2: 51 NG/ML (ref 30–100)
A/G RATIO: 2.2 RATIO (ref 1.1–2.2)
ALBUMIN: 4.4 G/DL (ref 3.5–5)
ALP BLD-CCNC: 52 U/L (ref 42–121)
ALT SERPL-CCNC: 32 U/L (ref 7–52)
ANION GAP SERPL CALCULATED.3IONS-SCNC: 8 MEQ/L (ref 4–14)
AST SERPL-CCNC: 23 U/L (ref 10–41)
BASOPHILS ABSOLUTE: 0.1 K/UL (ref 0–0.2)
BASOPHILS RELATIVE PERCENT: 1 % (ref 0–1.5)
BILIRUB SERPL-MCNC: 0.5 MG/DL (ref 0.3–1.5)
BUN BLDV-MCNC: 18 MG/DL (ref 7–25)
CALCIUM SERPL-MCNC: 9.4 MG/DL (ref 8.5–10.5)
CHLORIDE BLD-SCNC: 105 MEQ/L (ref 98–107)
CHOLESTEROL, TOTAL: 110 MG/DL (ref 0–199)
CO2: 27 MEQ/L (ref 21–31)
CREAT SERPL-MCNC: 1.29 MG/DL (ref 0.7–1.3)
CREATININE + EGFR PANEL: 67 ML/MIN
CREATININE URINE: 149.46 MG/DL
DIAGNOSTIC PSA: 1.16 NG/ML (ref 0–4)
EOSINOPHILS ABSOLUTE: 0.4 K/UL (ref 0–0.33)
EOSINOPHILS RELATIVE PERCENT: 4.4 % (ref 0–3)
GFR NON-AFRICAN AMERICAN: 55 ML/MIN
GLOBULIN: 2 G/DL (ref 1.9–3.9)
GLUCOSE BLD-MCNC: 112 MG/DL (ref 70–99)
HCT VFR BLD CALC: 46.5 % (ref 41–50)
HDLC SERPL-MCNC: 41 MG/DL
HEMOGLOBIN: 15.2 G/DL (ref 13.5–16.5)
LDL CHOLESTEROL: 59 MG/DL (ref 0–99)
LDL/HDL RATIO: 1.4 RATIO
LYMPHOCYTES ABSOLUTE: 1.8 K/UL (ref 1.1–4.8)
LYMPHOCYTES RELATIVE PERCENT: 19.6 % (ref 24–44)
MCH RBC QN AUTO: 28.8 PG (ref 28–34)
MCHC RBC AUTO-ENTMCNC: 32.7 G/DL (ref 33–37)
MCV RBC AUTO: 88 FL (ref 80–100)
MICROALBUMIN/CREAT 24H UR: 7.1 MG/DL (ref 0–1.8)
MICROALBUMIN/CREAT UR-RTO: 47.5 MG/G (ref 0–30)
MONOCYTES ABSOLUTE: 0.8 K/UL (ref 0.2–0.7)
MONOCYTES RELATIVE PERCENT: 8.3 % (ref 3.4–9)
NEUTROPHILS ABSOLUTE: 6.1 K/UL (ref 1.83–8.7)
NEUTROPHILS RELATIVE PERCENT: 66.7 % (ref 40–74)
PDW BLD-RTO: 15.5 % (ref 10.9–14.3)
PLATELET # BLD: 350 K/UL (ref 150–450)
PMV BLD AUTO: 9.7 FL (ref 7.4–10.4)
POTASSIUM SERPL-SCNC: 3.9 MEQ/L (ref 3.6–5)
RBC # BLD: 5.28 M/UL (ref 4.5–5.5)
SODIUM BLD-SCNC: 140 MEQ/L (ref 135–145)
TOTAL PROTEIN: 6.4 G/DL (ref 6.2–8)
TRIGL SERPL-MCNC: 169 MG/DL (ref 0–149)
TSH SERPL DL<=0.05 MIU/L-ACNC: 4.99 UIU/ML (ref 0.34–5.6)
WBC # BLD: 9.2 K/UL (ref 4.5–11)

## 2025-08-29 ENCOUNTER — OFFICE VISIT (OUTPATIENT)
Dept: FAMILY MEDICINE CLINIC | Age: 70
End: 2025-08-29

## 2025-08-29 ENCOUNTER — PATIENT MESSAGE (OUTPATIENT)
Dept: FAMILY MEDICINE CLINIC | Age: 70
End: 2025-08-29

## 2025-08-29 VITALS
HEIGHT: 72 IN | BODY MASS INDEX: 33.46 KG/M2 | SYSTOLIC BLOOD PRESSURE: 154 MMHG | WEIGHT: 247 LBS | TEMPERATURE: 98.7 F | HEART RATE: 98 BPM | DIASTOLIC BLOOD PRESSURE: 68 MMHG | OXYGEN SATURATION: 94 %

## 2025-08-29 DIAGNOSIS — R39.9 LOWER URINARY TRACT SYMPTOMS (LUTS): ICD-10-CM

## 2025-08-29 DIAGNOSIS — E78.2 MIXED HYPERLIPIDEMIA: ICD-10-CM

## 2025-08-29 DIAGNOSIS — Z79.4 DIABETES MELLITUS TYPE 2, INSULIN DEPENDENT (HCC): ICD-10-CM

## 2025-08-29 DIAGNOSIS — I10 ESSENTIAL HYPERTENSION: ICD-10-CM

## 2025-08-29 DIAGNOSIS — K51.90 ULCERATIVE COLITIS WITHOUT COMPLICATIONS, UNSPECIFIED LOCATION (HCC): ICD-10-CM

## 2025-08-29 DIAGNOSIS — E11.9 DIABETES MELLITUS TYPE 2, INSULIN DEPENDENT (HCC): ICD-10-CM

## 2025-08-29 DIAGNOSIS — F41.9 ANXIETY: ICD-10-CM

## 2025-08-29 DIAGNOSIS — I25.118 CORONARY ARTERY DISEASE OF NATIVE ARTERY OF NATIVE HEART WITH STABLE ANGINA PECTORIS: Primary | ICD-10-CM

## 2025-08-29 RX ORDER — AMLODIPINE BESYLATE 10 MG/1
10 TABLET ORAL DAILY
Qty: 100 TABLET | Refills: 1 | Status: SHIPPED | OUTPATIENT
Start: 2025-08-29

## 2025-08-29 RX ORDER — FENOFIBRATE 145 MG/1
145 TABLET, FILM COATED ORAL DAILY
Qty: 90 TABLET | Refills: 1 | Status: SHIPPED | OUTPATIENT
Start: 2025-08-29

## 2025-08-29 ASSESSMENT — ENCOUNTER SYMPTOMS
BLOOD IN STOOL: 0
DIARRHEA: 0
APNEA: 0
SINUS PAIN: 0
FACIAL SWELLING: 0
VOMITING: 0
COUGH: 0
CONSTIPATION: 0
SHORTNESS OF BREATH: 0
COLOR CHANGE: 0
PHOTOPHOBIA: 0
ABDOMINAL DISTENTION: 0
CHEST TIGHTNESS: 0
SINUS PRESSURE: 0
RHINORRHEA: 0

## 2025-09-02 RX ORDER — FENOFIBRATE 145 MG/1
145 TABLET, FILM COATED ORAL DAILY
Qty: 100 TABLET | Refills: 3 | Status: SHIPPED | OUTPATIENT
Start: 2025-09-02

## 2025-09-02 RX ORDER — TAMSULOSIN HYDROCHLORIDE 0.4 MG/1
0.8 CAPSULE ORAL DAILY
Qty: 200 CAPSULE | Refills: 3 | Status: SHIPPED | OUTPATIENT
Start: 2025-09-02